# Patient Record
Sex: MALE | Race: WHITE | NOT HISPANIC OR LATINO | Employment: OTHER | URBAN - METROPOLITAN AREA
[De-identification: names, ages, dates, MRNs, and addresses within clinical notes are randomized per-mention and may not be internally consistent; named-entity substitution may affect disease eponyms.]

---

## 2017-03-27 ENCOUNTER — HOSPITAL ENCOUNTER (OUTPATIENT)
Dept: LAB | Facility: MEDICAL CENTER | Age: 72
End: 2017-03-27
Attending: INTERNAL MEDICINE
Payer: MEDICARE

## 2017-03-27 DIAGNOSIS — E03.4 HYPOTHYROIDISM DUE TO ACQUIRED ATROPHY OF THYROID: ICD-10-CM

## 2017-03-27 DIAGNOSIS — M54.40 CHRONIC BILATERAL LOW BACK PAIN WITH SCIATICA, SCIATICA LATERALITY UNSPECIFIED: ICD-10-CM

## 2017-03-27 DIAGNOSIS — E78.2 MIXED HYPERLIPIDEMIA: ICD-10-CM

## 2017-03-27 DIAGNOSIS — G89.29 CHRONIC BILATERAL LOW BACK PAIN WITH SCIATICA, SCIATICA LATERALITY UNSPECIFIED: ICD-10-CM

## 2017-03-27 LAB
ALBUMIN SERPL BCP-MCNC: 4 G/DL (ref 3.2–4.9)
ALBUMIN/GLOB SERPL: 1.5 G/DL
ALP SERPL-CCNC: 46 U/L (ref 30–99)
ALT SERPL-CCNC: 24 U/L (ref 2–50)
ANION GAP SERPL CALC-SCNC: 7 MMOL/L (ref 0–11.9)
APPEARANCE UR: CLEAR
AST SERPL-CCNC: 26 U/L (ref 12–45)
BASOPHILS # BLD AUTO: 0.1 K/UL (ref 0–0.12)
BASOPHILS NFR BLD AUTO: 1.6 % (ref 0–1.8)
BILIRUB SERPL-MCNC: 0.4 MG/DL (ref 0.1–1.5)
BILIRUB UR QL STRIP.AUTO: NEGATIVE
BUN SERPL-MCNC: 19 MG/DL (ref 8–22)
CALCIUM SERPL-MCNC: 9.2 MG/DL (ref 8.5–10.5)
CHLORIDE SERPL-SCNC: 107 MMOL/L (ref 96–112)
CHOLEST SERPL-MCNC: 130 MG/DL (ref 100–199)
CO2 SERPL-SCNC: 27 MMOL/L (ref 20–33)
COLOR UR AUTO: YELLOW
CREAT SERPL-MCNC: 1.14 MG/DL (ref 0.5–1.4)
CULTURE IF INDICATED INDCX: NO UA CULTURE
EOSINOPHIL # BLD: 0.36 K/UL (ref 0–0.51)
EOSINOPHIL NFR BLD AUTO: 5.7 % (ref 0–6.9)
ERYTHROCYTE [DISTWIDTH] IN BLOOD BY AUTOMATED COUNT: 44.9 FL (ref 35.9–50)
GLOBULIN SER CALC-MCNC: 2.7 G/DL (ref 1.9–3.5)
GLUCOSE SERPL-MCNC: 108 MG/DL (ref 65–99)
GLUCOSE UR STRIP.AUTO-MCNC: NEGATIVE MG/DL
HCT VFR BLD AUTO: 42.5 % (ref 42–52)
HDLC SERPL-MCNC: 41 MG/DL
HGB BLD-MCNC: 14.1 G/DL (ref 14–18)
IMM GRANULOCYTES # BLD AUTO: 0.02 K/UL (ref 0–0.11)
IMM GRANULOCYTES NFR BLD AUTO: 0.3 % (ref 0–0.9)
KETONES UR STRIP.AUTO-MCNC: NEGATIVE MG/DL
LDLC SERPL CALC-MCNC: 72 MG/DL
LEUKOCYTE ESTERASE UR QL STRIP.AUTO: NEGATIVE
LYMPHOCYTES # BLD: 1.89 K/UL (ref 1–4.8)
LYMPHOCYTES NFR BLD AUTO: 29.7 % (ref 22–41)
MCH RBC QN AUTO: 31.5 PG (ref 27–33)
MCHC RBC AUTO-ENTMCNC: 33.2 G/DL (ref 33.7–35.3)
MCV RBC AUTO: 95.1 FL (ref 81.4–97.8)
MICRO URNS: NORMAL
MONOCYTES # BLD: 0.69 K/UL (ref 0–0.85)
MONOCYTES NFR BLD AUTO: 10.8 % (ref 0–13.4)
NEUTROPHILS # BLD: 3.3 K/UL (ref 1.82–7.42)
NEUTROPHILS NFR BLD AUTO: 51.9 % (ref 44–72)
NITRITE UR QL STRIP.AUTO: NEGATIVE
NRBC # BLD AUTO: 0.02 K/UL
NRBC BLD-RTO: 0.3 /100 WBC
PH UR: 6 [PH]
PLATELET # BLD AUTO: 320 K/UL (ref 164–446)
PMV BLD AUTO: 9.7 FL (ref 9–12.9)
POTASSIUM SERPL-SCNC: 4 MMOL/L (ref 3.6–5.5)
PROT SERPL-MCNC: 6.7 G/DL (ref 6–8.2)
PROT UR QL STRIP: NEGATIVE MG/DL
RBC # BLD AUTO: 4.47 M/UL (ref 4.7–6.1)
RBC UR QL AUTO: NEGATIVE
SODIUM SERPL-SCNC: 141 MMOL/L (ref 135–145)
SP GR UR STRIP.AUTO: 1.02
TRIGL SERPL-MCNC: 85 MG/DL (ref 0–149)
TSH SERPL DL<=0.005 MIU/L-ACNC: 1.31 UIU/ML (ref 0.3–3.7)
WBC # BLD AUTO: 6.4 K/UL (ref 4.8–10.8)

## 2017-03-27 PROCEDURE — 80053 COMPREHEN METABOLIC PANEL: CPT

## 2017-03-27 PROCEDURE — 36415 COLL VENOUS BLD VENIPUNCTURE: CPT

## 2017-03-27 PROCEDURE — 85025 COMPLETE CBC W/AUTO DIFF WBC: CPT

## 2017-03-27 PROCEDURE — 84443 ASSAY THYROID STIM HORMONE: CPT

## 2017-03-27 PROCEDURE — 80061 LIPID PANEL: CPT

## 2017-03-27 PROCEDURE — 81003 URINALYSIS AUTO W/O SCOPE: CPT

## 2017-03-29 ENCOUNTER — APPOINTMENT (OUTPATIENT)
Dept: MEDICAL GROUP | Age: 72
End: 2017-03-29
Payer: MEDICARE

## 2017-03-29 ENCOUNTER — OFFICE VISIT (OUTPATIENT)
Dept: MEDICAL GROUP | Age: 72
End: 2017-03-29
Payer: MEDICARE

## 2017-03-29 VITALS
TEMPERATURE: 97.5 F | HEIGHT: 69 IN | SYSTOLIC BLOOD PRESSURE: 108 MMHG | WEIGHT: 167 LBS | DIASTOLIC BLOOD PRESSURE: 68 MMHG | BODY MASS INDEX: 24.73 KG/M2 | HEART RATE: 74 BPM | OXYGEN SATURATION: 96 %

## 2017-03-29 DIAGNOSIS — Z79.899 MEDICATION MANAGEMENT: ICD-10-CM

## 2017-03-29 DIAGNOSIS — D17.1 LIPOMA OF BACK: ICD-10-CM

## 2017-03-29 DIAGNOSIS — Z23 NEED FOR VACCINATION: ICD-10-CM

## 2017-03-29 DIAGNOSIS — R73.01 IFG (IMPAIRED FASTING GLUCOSE): ICD-10-CM

## 2017-03-29 DIAGNOSIS — R10.11 RUQ ABDOMINAL PAIN: ICD-10-CM

## 2017-03-29 PROCEDURE — 99214 OFFICE O/P EST MOD 30 MIN: CPT | Performed by: FAMILY MEDICINE

## 2017-03-29 RX ORDER — AMOXICILLIN 875 MG/1
875 TABLET, COATED ORAL 2 TIMES DAILY
Qty: 20 TAB | Refills: 0 | Status: SHIPPED | OUTPATIENT
Start: 2017-03-29 | End: 2017-04-13

## 2017-03-29 NOTE — ASSESSMENT & PLAN NOTE
Patient states that he travels all over the [place] and he likes amoxicillin with him at all times.

## 2017-03-29 NOTE — ASSESSMENT & PLAN NOTE
The patient denies  any polydipsia, polyuria, polyphagia, weight loss, no numbness or tingling anywhere, no changes in vision.    Results for ERLIN SALDAÑA (MRN [id number]) as of 3/29/2017 15:09   Ref. Range 3/27/2017 06:52   Glucose Latest Ref Range: 65-99 mg/dL 108 (H)

## 2017-03-29 NOTE — PROGRESS NOTES
This medical record contains text that has been entered with the assistance of computer voice recognition and dictation software.  Therefore, it may contain unintended errors in text, spelling, punctuation, or grammar    No chief complaint on file.      Dusty Saldaña is a 71 y.o. male here evaluation and management of: Lump on back, right upper quadrant pain, elevated fasting glucose      HPI:     RUQ abdominal pain  Patient states that he's been having right upper quadrant pain for the past 3 months. The pain is sharp in nature, episodic, 4-10 does not radiate anywhere, it is not associated with any type of food. Denies any discoloration of the skin, no blood in stool, no dark tarry stool, no vomiting.    Medication management  Patient states that he travels all over the [place] and he likes amoxicillin with him at all times.     Lipoma of back  Patient states that his physical therapist was doing therapy with him and noticed that he had a lump on his left upper back. It's been there for at least a year is causing pain and he would like to have it removed. Denies any loss of bladder or bowel function and numbness returning anywhere.    IFG (impaired fasting glucose)  The patient denies  any polydipsia, polyuria, polyphagia, weight loss, no numbness or tingling anywhere, no changes in vision.    Results for DUSTY SALDAÑA (MRN [id number]) as of 3/29/2017 15:09   Ref. Range 3/27/2017 06:52   Glucose Latest Ref Range: 65-99 mg/dL 108 (H)         Current medicines (including changes today)  Current Outpatient Prescriptions   Medication Sig Dispense Refill   • amoxicillin (AMOXIL) 875 MG tablet Take 1 Tab by mouth 2 times a day. 20 Tab 0   • meloxicam (MOBIC) 15 MG tablet Take 15 mg by mouth every day.     • levothyroxine (SYNTHROID) 125 MCG Tab TAKE 1 TABLET BY MOUTH EVERY DAY **ALLOW 24- 48HRS FOR REFILLS** 90 Tab 4   • atorvastatin (LIPITOR) 10 MG Tab Take 1 Tab by mouth every day. 90 Tab 4   • NON SPECIFIED  "Shingles vaccine 1 Each 0   • omega-3 acid ethyl esters (LOVAZA) 1 GM capsule Take 1 Cap by mouth every day. 90 Cap 3     No current facility-administered medications for this visit.     He  has a past medical history of Hyperlipidemia; Thyroid disease; and Erectile dysfunction.  He  has past surgical history that includes hernia repair and eye surgery.  Social History   Substance Use Topics   • Smoking status: Never Smoker    • Smokeless tobacco: Never Used   • Alcohol Use: 0.0 oz/week     0 Standard drinks or equivalent per week      Comment: Occasional wine      Social History     Social History Narrative     Family History   Problem Relation Age of Onset   • Cancer Father      Family Status   Relation Status Death Age   • Mother Alive    • Father           ROS  Please see history of present illness    All other systems reviewed and are negative     Objective:     Blood pressure 108/68, pulse 74, temperature 36.4 °C (97.5 °F), height 1.753 m (5' 9.02\"), weight 75.751 kg (167 lb), SpO2 96 %. Body mass index is 24.65 kg/(m^2).  Physical Exam:    Constitutional: Alert, no distress.  Skin: Warm, dry, good turgor, no rashes in visible areas.  Eye: Equal, round and reactive, conjunctiva clear, lids normal.  ENMT: Lips without lesions, good dentition, oropharynx clear.  Neck: Trachea midline, no masses, no thyromegaly. No cervical or supraclavicular lymphadenopathy.  Respiratory: Unlabored respiratory effort, lungs clear to auscultation, no wheezes, no ronchi.  Cardiovascular: Normal S1, S2, no murmur, no edema.  Abdomen: Soft, non-tender, no masses, no hepatosplenomegaly.  Psych: Alert and oriented x3, normal affect and mood.  Back--- left upper trapezius-- 2 cm round symmetrical, non-motile, nonfluctuant lipomatous mass, not warm, not red        Assessment and Plan:   The following treatment plan was discussed, again this medical record contains text that has been entered with the assistance of computer voice " recognition and dictation software.  Therefore, it may contain unintended errors in text, spelling, punctuation, or grammar      1. Lipoma of back  Explained to patient that this is deeper than it looks  He would need to have this removed and deshelled in surgery    - REFERRAL TO GENERAL SURGERY    3. RUQ abdominal pain    - US-GALLBLADDER; Future    4. Medication management    - amoxicillin (AMOXIL) 875 MG tablet; Take 1 Tab by mouth 2 times a day.  Dispense: 20 Tab; Refill: 0    5. IFG (impaired fasting glucose)    Discussed the following with patient  IFG= A1C of 5.7-6.4   Impaired glucose tolerance (IGT) - OGTT (75 g oral glucose load) results in a two-hour plasma glucose of 140 to 199 mg/dL (7.8 to 11.0 mmol/L).  Impaired fasting glucose (IFG) -  to 125 mg/dL (5.6 to 6.9 mmol/L).    I recommend  aggressive lifestyle modification (weight loss, and increase physical activity)    There is a  risk of developing Diabetes mellitus--and the further increased risk in patients who are obese and have a positive family history    The goals of intervention in individuals  at risk for developing DM include the prevention or delay of DM and the associated increased risk of Microvascular complications and cardiovascular disease (CVD)      If lifestyle interventions fail, then we shall begin Metformin for Diabetes prevention, this is based on the Diabetes prevention  program (DPP)          Followup: Return in about 3 months (around 6/29/2017) for Reevaluation.

## 2017-03-29 NOTE — MR AVS SNAPSHOT
"        Dusty Eid   3/29/2017 3:20 PM   Office Visit   MRN: [id number]    Department:  [address] [place] [address]   Dept Phone:  [phone]    Description:  Male : 1945   Provider:  Larissa Ventura M.D.           Allergies as of 3/29/2017     Allergen Noted Reactions    Codeine 2010         You were diagnosed with     Need for vaccination   [550105]       Lipoma of back   [223501]       RUQ abdominal pain   [348764]       Medication management   [642168]       IFG (impaired fasting glucose)   [099692]         Vital Signs     Blood Pressure Pulse Temperature Height Weight Body Mass Index    108/68 mmHg 74 36.4 °C (97.5 °F) 1.753 m (5' 9.02\") 75.751 kg (167 lb) 24.65 kg/m2    Oxygen Saturation Smoking Status                96% Never Smoker           Basic Information     Date Of Birth Sex Race Ethnicity Preferred Language    1945 Male White Non- English      Your appointments     2017  8:00 AM   US ABDOMEN FASTING (30 MINUTES) with [facility] US [facility]   [facility]    [facility]  [address]  [address]  [place] [address] [phone]           NPO 8 hours.  For  Abdomen, NPO 2 hours, schedule Abdomen Complete and include \" Abdomen\" in Appt Notes.            May 12, 2017  8:20 AM   Established Patient with Marcial Mcfarlane M.D.   [address] [place] [address] [phone]           You will be receiving a confirmation call a few days before your appointment from our automated call confirmation system.              Problem List              ICD-10-CM Priority Class Noted - Resolved    Hypothyroidism due to acquired atrophy of thyroid E03.4   2010 - Present    Chronic left hip pain- mild djd on xray M25.552, G89.29   2015 - Present    Mixed hyperlipidemia E78.2   2015 - Present    Chronic right shoulder pain- r/o RCT-ref to ortho " M25.511, G89.29   3/30/2016 - Present    DJD (degenerative joint disease), cervical M50.30   11/11/2016 - Present    Lipoma of back D17.1   3/29/2017 - Present    RUQ abdominal pain R10.11   3/29/2017 - Present    Medication management Z79.899   3/29/2017 - Present    IFG (impaired fasting glucose) R73.01   3/29/2017 - Present      Health Maintenance        Date Due Completion Dates    IMM DTaP/Tdap/Td Vaccine (1 - Tdap) 11/14/1964 ---    IMM ZOSTER VACCINE 11/14/2005 ---    IMM PNEUMOCOCCAL 65+ (ADULT) LOW/MEDIUM RISK SERIES (2 of 2 - PPSV23) 2/24/2017 2/24/2016    COLONOSCOPY 2/12/2019 2/12/2014            Current Immunizations     13-VALENT PCV PREVNAR 2/24/2016 11:01 AM    Influenza Vaccine Adult HD 11/11/2016      Below and/or attached are the medications your provider expects you to take. Review all of your home medications and newly ordered medications with your provider and/or pharmacist. Follow medication instructions as directed by your provider and/or pharmacist. Please keep your medication list with you and share with your provider. Update the information when medications are discontinued, doses are changed, or new medications (including over-the-counter products) are added; and carry medication information at all times in the event of emergency situations     Allergies:  CODEINE - (reactions not documented)               Medications  Valid as of: March 29, 2017 -  3:45 PM    Generic Name Brand Name Tablet Size Instructions for use    Amoxicillin (Tab) AMOXIL 875 MG Take 1 Tab by mouth 2 times a day.        Atorvastatin Calcium (Tab) LIPITOR 10 MG Take 1 Tab by mouth every day.        Levothyroxine Sodium (Tab) SYNTHROID 125 MCG TAKE 1 TABLET BY MOUTH EVERY DAY **ALLOW 24- 48HRS FOR REFILLS**        Meloxicam (Tab) MOBIC 15 MG Take 15 mg by mouth every day.        NON SPECIFIED   Shingles vaccine        Omega-3-acid Ethyl Esters (Cap) LOVAZA 1 GM Take 1 Cap by mouth every day.        .                    Medicines prescribed today were sent to:     [id number] - [place], [place] - [address]    [address] [facility] [place] [address]    Phone: [phone] Fax: [phone]    Open 24 Hours?: No      Medication refill instructions:       If your prescription bottle indicates you have medication refills left, it is not necessary to call your provider’s office. Please contact your pharmacy and they will refill your medication.    If your prescription bottle indicates you do not have any refills left, you may request refills at any time through one of the following ways: The online [employer] system (except Urgent Care), by calling your provider’s office, or by asking your pharmacy to contact your provider’s office with a refill request. Medication refills are processed only during regular business hours and may not be available until the next business day. Your provider may request additional information or to have a follow-up visit with you prior to refilling your medication.   *Please Note: Medication refills are assigned a new Rx number when refilled electronically. Your pharmacy may indicate that no refills were authorized even though a new prescription for the same medication is available at the pharmacy. Please request the medicine by name with the pharmacy before contacting your provider for a refill.        Your To Do List     Future Labs/Procedures Complete By [facility]GALLBL[facility]DER  As directed 3/29/2018      Referral     A referral request has been sent to our patient care coordination department. Please allow 3-5 business days for us to process this request and contact you either by phone or mail. If you do not hear from us by the 5th business day, please call us at [phone].           [employer] Access Code: Activation code not generated  Current [employer] Status: Active

## 2017-03-29 NOTE — ASSESSMENT & PLAN NOTE
Patient states that he's been having right upper quadrant pain for the past 3 months. The pain is sharp in nature, episodic, 4-10 does not radiate anywhere, it is not associated with any type of food. Denies any discoloration of the skin, no blood in stool, no dark tarry stool, no vomiting.

## 2017-03-29 NOTE — ASSESSMENT & PLAN NOTE
Patient states that his physical therapist was doing therapy with him and noticed that he had a lump on his left upper back. It's been there for at least a year is causing pain and he would like to have it removed. Denies any loss of bladder or bowel function and numbness returning anywhere.

## 2017-04-04 ENCOUNTER — HOSPITAL ENCOUNTER (OUTPATIENT)
Dept: RADIOLOGY | Facility: MEDICAL CENTER | Age: 72
End: 2017-04-04
Attending: FAMILY MEDICINE
Payer: MEDICARE

## 2017-04-04 DIAGNOSIS — R10.11 RUQ ABDOMINAL PAIN: ICD-10-CM

## 2017-04-04 PROCEDURE — 76705 ECHO EXAM OF ABDOMEN: CPT

## 2017-04-13 ENCOUNTER — HOSPITAL ENCOUNTER (OUTPATIENT)
Dept: RADIOLOGY | Facility: MEDICAL CENTER | Age: 72
End: 2017-04-13
Attending: SURGERY | Admitting: SURGERY
Payer: MEDICARE

## 2017-04-13 DIAGNOSIS — Z01.810 PRE-OPERATIVE CARDIOVASCULAR EXAMINATION: ICD-10-CM

## 2017-04-13 DIAGNOSIS — Z01.811 PRE-OPERATIVE RESPIRATORY EXAMINATION: ICD-10-CM

## 2017-04-13 DIAGNOSIS — Z01.812 PRE-OPERATIVE LABORATORY EXAMINATION: ICD-10-CM

## 2017-04-13 LAB
ANION GAP SERPL CALC-SCNC: 7 MMOL/L (ref 0–11.9)
BASOPHILS # BLD AUTO: 1 % (ref 0–1.8)
BASOPHILS # BLD: 0.07 K/UL (ref 0–0.12)
BUN SERPL-MCNC: 15 MG/DL (ref 8–22)
CALCIUM SERPL-MCNC: 9.6 MG/DL (ref 8.5–10.5)
CHLORIDE SERPL-SCNC: 103 MMOL/L (ref 96–112)
CO2 SERPL-SCNC: 26 MMOL/L (ref 20–33)
CREAT SERPL-MCNC: 1.11 MG/DL (ref 0.5–1.4)
EOSINOPHIL # BLD AUTO: 0.41 K/UL (ref 0–0.51)
EOSINOPHIL NFR BLD: 5.8 % (ref 0–6.9)
ERYTHROCYTE [DISTWIDTH] IN BLOOD BY AUTOMATED COUNT: 44.9 FL (ref 35.9–50)
GFR SERPL CREATININE-BSD FRML MDRD: >60 ML/MIN/1.73 M 2
GLUCOSE SERPL-MCNC: 93 MG/DL (ref 65–99)
HCT VFR BLD AUTO: 44.9 % (ref 42–52)
HGB BLD-MCNC: 15 G/DL (ref 14–18)
IMM GRANULOCYTES # BLD AUTO: 0.02 K/UL (ref 0–0.11)
IMM GRANULOCYTES NFR BLD AUTO: 0.3 % (ref 0–0.9)
LYMPHOCYTES # BLD AUTO: 1.7 K/UL (ref 1–4.8)
LYMPHOCYTES NFR BLD: 24 % (ref 22–41)
MCH RBC QN AUTO: 31.4 PG (ref 27–33)
MCHC RBC AUTO-ENTMCNC: 33.4 G/DL (ref 33.7–35.3)
MCV RBC AUTO: 94.1 FL (ref 81.4–97.8)
MONOCYTES # BLD AUTO: 0.79 K/UL (ref 0–0.85)
MONOCYTES NFR BLD AUTO: 11.1 % (ref 0–13.4)
NEUTROPHILS # BLD AUTO: 4.1 K/UL (ref 1.82–7.42)
NEUTROPHILS NFR BLD: 57.8 % (ref 44–72)
NRBC # BLD AUTO: 0 K/UL
NRBC BLD AUTO-RTO: 0 /100 WBC
PLATELET # BLD AUTO: 288 K/UL (ref 164–446)
PMV BLD AUTO: 9.4 FL (ref 9–12.9)
POTASSIUM SERPL-SCNC: 3.9 MMOL/L (ref 3.6–5.5)
RBC # BLD AUTO: 4.77 M/UL (ref 4.7–6.1)
SODIUM SERPL-SCNC: 136 MMOL/L (ref 135–145)
WBC # BLD AUTO: 7.1 K/UL (ref 4.8–10.8)

## 2017-04-13 PROCEDURE — 71020 DX-CHEST-2 VIEWS: CPT

## 2017-04-13 PROCEDURE — 85025 COMPLETE CBC W/AUTO DIFF WBC: CPT

## 2017-04-13 PROCEDURE — 80048 BASIC METABOLIC PNL TOTAL CA: CPT

## 2017-04-13 PROCEDURE — 36415 COLL VENOUS BLD VENIPUNCTURE: CPT

## 2017-04-14 LAB — EKG IMPRESSION: NORMAL

## 2017-04-19 ENCOUNTER — HOSPITAL ENCOUNTER (OUTPATIENT)
Facility: MEDICAL CENTER | Age: 72
End: 2017-04-19
Attending: SURGERY | Admitting: SURGERY
Payer: MEDICARE

## 2017-04-19 VITALS
SYSTOLIC BLOOD PRESSURE: 126 MMHG | HEIGHT: 68 IN | OXYGEN SATURATION: 96 % | TEMPERATURE: 98 F | DIASTOLIC BLOOD PRESSURE: 68 MMHG | HEART RATE: 44 BPM | BODY MASS INDEX: 24.76 KG/M2 | WEIGHT: 163.36 LBS | RESPIRATION RATE: 14 BRPM

## 2017-04-19 PROBLEM — D17.1 LIPOMA OF BACK: Status: RESOLVED | Noted: 2017-03-29 | Resolved: 2017-04-19

## 2017-04-19 PROCEDURE — A4606 OXYGEN PROBE USED W OXIMETER: HCPCS | Performed by: SURGERY

## 2017-04-19 PROCEDURE — 700102 HCHG RX REV CODE 250 W/ 637 OVERRIDE(OP)

## 2017-04-19 PROCEDURE — 160036 HCHG PACU - EA ADDL 30 MINS PHASE I: Performed by: SURGERY

## 2017-04-19 PROCEDURE — 160048 HCHG OR STATISTICAL LEVEL 1-5: Performed by: SURGERY

## 2017-04-19 PROCEDURE — 501838 HCHG SUTURE GENERAL: Performed by: SURGERY

## 2017-04-19 PROCEDURE — 160009 HCHG ANES TIME/MIN: Performed by: SURGERY

## 2017-04-19 PROCEDURE — 110382 HCHG SHELL REV 271: Performed by: SURGERY

## 2017-04-19 PROCEDURE — A6402 STERILE GAUZE <= 16 SQ IN: HCPCS | Performed by: SURGERY

## 2017-04-19 PROCEDURE — 110371 HCHG SHELL REV 272: Performed by: SURGERY

## 2017-04-19 PROCEDURE — 160002 HCHG RECOVERY MINUTES (STAT): Performed by: SURGERY

## 2017-04-19 PROCEDURE — 500888 HCHG PACK, MINOR BASIN: Performed by: SURGERY

## 2017-04-19 PROCEDURE — 160035 HCHG PACU - 1ST 60 MINS PHASE I: Performed by: SURGERY

## 2017-04-19 PROCEDURE — A9270 NON-COVERED ITEM OR SERVICE: HCPCS

## 2017-04-19 PROCEDURE — 160028 HCHG SURGERY MINUTES - 1ST 30 MINS LEVEL 3: Performed by: SURGERY

## 2017-04-19 PROCEDURE — 160039 HCHG SURGERY MINUTES - EA ADDL 1 MIN LEVEL 3: Performed by: SURGERY

## 2017-04-19 PROCEDURE — 700101 HCHG RX REV CODE 250

## 2017-04-19 PROCEDURE — 88304 TISSUE EXAM BY PATHOLOGIST: CPT

## 2017-04-19 PROCEDURE — 700111 HCHG RX REV CODE 636 W/ 250 OVERRIDE (IP)

## 2017-04-19 RX ORDER — BUPIVACAINE HYDROCHLORIDE AND EPINEPHRINE 5; 5 MG/ML; UG/ML
INJECTION, SOLUTION EPIDURAL; INTRACAUDAL; PERINEURAL
Status: DISCONTINUED | OUTPATIENT
Start: 2017-04-19 | End: 2017-04-19 | Stop reason: HOSPADM

## 2017-04-19 RX ORDER — ONDANSETRON 2 MG/ML
4 INJECTION INTRAMUSCULAR; INTRAVENOUS EVERY 4 HOURS PRN
Status: DISCONTINUED | OUTPATIENT
Start: 2017-04-19 | End: 2017-04-19 | Stop reason: HOSPADM

## 2017-04-19 RX ORDER — SODIUM CHLORIDE, SODIUM LACTATE, POTASSIUM CHLORIDE, CALCIUM CHLORIDE 600; 310; 30; 20 MG/100ML; MG/100ML; MG/100ML; MG/100ML
1000 INJECTION, SOLUTION INTRAVENOUS
Status: COMPLETED | OUTPATIENT
Start: 2017-04-19 | End: 2017-04-19

## 2017-04-19 RX ADMIN — SODIUM CHLORIDE, SODIUM LACTATE, POTASSIUM CHLORIDE, CALCIUM CHLORIDE 1000 ML: 600; 310; 30; 20 INJECTION, SOLUTION INTRAVENOUS at 10:50

## 2017-04-19 ASSESSMENT — PAIN SCALES - GENERAL
PAINLEVEL_OUTOF10: 1
PAINLEVEL_OUTOF10: 0

## 2017-04-19 NOTE — OR NURSING
1207: report received from Dr. Arvizu. To PACU from OR via gurney, sleeping, respirations spontaneous and non-labored via OPA.   1222: sleeping. OPA in place.   1230: OPA removed without incident. o2 to 2L. Tagaderm to upper mid back with small amount blood under dressing. Patient denies pain or nausea.  1245: sleeping.  1300: patients spouse to RR. Tolerating po water. Denies pain or nausea. No change in surgical site assessment.  1310: spouse assisted patient with dressing.  1322: discharge instructions reviewed with patient and spouse and patient ambulated with steady gait to exit with all belongings.

## 2017-04-19 NOTE — DISCHARGE INSTRUCTIONS
ACTIVITY: Rest and take it easy for the first 24 hours.  A responsible adult is recommended to remain with you during that time.  It is normal to feel sleepy.  We encourage you to not do anything that requires balance, judgment or coordination.    MILD FLU-LIKE SYMPTOMS ARE NORMAL. YOU MAY EXPERIENCE GENERALIZED MUSCLE ACHES, THROAT IRRITATION, HEADACHE AND/OR SOME NAUSEA.    FOR 24 HOURS DO NOT:  Drive, operate machinery or run household appliances.  Drink beer or alcoholic beverages.   Make important decisions or sign legal documents.    SPECIAL INSTRUCTIONS: *Follow up: 7-10 days  Activity: no lifting weight greater than 20lbs x2  weeks  Diet: clear liquids tonight then advance as tolerated  Showers only x 2 weeks  No driving for one week or while on pain medications  Wound Care: remove tegaderm in 4 days*      DIET: To avoid nausea, slowly advance diet as tolerated, avoiding spicy or greasy foods for the first day.  Add more substantial food to your diet according to your physician's instructions.  Babies can be fed formula or breast milk as soon as they are hungry.  INCREASE FLUIDS AND FIBER TO AVOID CONSTIPATION.    SURGICAL DRESSING/BATHING: see above    FOLLOW-UP APPOINTMENT:  A follow-up appointment should be arranged with your doctor in 7-10 days; call to schedule.    You should CALL YOUR PHYSICIAN if you develop:  Fever greater than 101 degrees F.  Pain not relieved by medication, or persistent nausea or vomiting.  Excessive bleeding (blood soaking through dressing) or unexpected drainage from the wound.  Extreme redness or swelling around the incision site, drainage of pus or foul smelling drainage.  Inability to urinate or empty your bladder within 8 hours.  Problems with breathing or chest pain.    You should call 911 if you develop problems with breathing or chest pain.  If you are unable to contact your doctor or surgical center, you should go to the nearest emergency room or urgent care center.   Physician's telephone #: [phone]    If any questions arise, call your doctor.  If your doctor is not available, please feel free to call the Surgical Center at [phone].  The Center is open Monday through Friday from 7AM to 7PM.  You can also call the HEALTH HOTLINE open 24 hours/day, 7 days/week and speak to a nurse at [phone], or toll free at [phone].    A registered nurse may call you a few days after your surgery to see how you are doing after your procedure.    MEDICATIONS: Resume taking daily medication.  Take prescribed pain medication with food.  If no medication is prescribed, you may take non-aspirin pain medication if needed.  PAIN MEDICATION CAN BE VERY CONSTIPATING.  Take a stool softener or laxative such as senokot, pericolace, or milk of magnesia if needed.    Prescription given for to family.  Last pain medication given at n/a. Take your first dose pain medication when needed today.    If your physician has prescribed pain medication that includes Acetaminophen (Tylenol), do not take additional Acetaminophen (Tylenol) while taking the prescribed medication.    Depression / Suicide Risk    As you are discharged from this [facility] facility, it is important to learn how to keep safe from harming yourself.    Recognize the warning signs:  · Abrupt changes in personality, positive or negative- including increase in energy   · Giving away possessions  · Change in eating patterns- significant weight changes-  positive or negative  · Change in sleeping patterns- unable to sleep or sleeping all the time   · Unwillingness or inability to communicate  · Depression  · Unusual sadness, discouragement and loneliness  · Talk of wanting to die  · Neglect of personal appearance   · Rebelliousness- reckless behavior  · Withdrawal from people/activities they love  · Confusion- inability to concentrate     If you or a loved one observes any of these behaviors or has concerns about self-harm,  here's what you can do:  · Talk about it- your feelings and reasons for harming yourself  · Remove any means that you might use to hurt yourself (examples: pills, rope, extension cords, firearm)  · Get professional help from the community (Mental Health, Substance Abuse, psychological counseling)  · Do not be alone:Call your Safe Contact- someone whom you trust who will be there for you.  · Call your local CRISIS HOTLINE 540-0923 or 920-141-3968  · Call your local Children's Mobile Crisis Response Team [place] (889) 519-0923 or www.[employer] s.r.l  · Call the toll free National Suicide Prevention Hotlines   · National Suicide Prevention Lifeline 943-716-BUJZ (3799)  · National Hope Line Network 800-SUICIDE (064-1318)

## 2017-04-19 NOTE — IP AVS SNAPSHOT
" Home Care Instructions                                                                                                                Name:Dusty Eid  Medical Record Number:[id number]  CSN: [id number]    YOB: 1945   Age: 71 y.o.  Sex: male  HT:1.727 m (5' 8\") WT: 74.1 kg (163 lb 5.8 oz)          Admit Date: 4/19/2017     Discharge Date:   Today's Date: 4/19/2017  Attending Doctor:  David Rivas M.D.                  Allergies:  Codeine                Discharge Instructions         ACTIVITY: Rest and take it easy for the first 24 hours.  A responsible adult is recommended to remain with you during that time.  It is normal to feel sleepy.  We encourage you to not do anything that requires balance, judgment or coordination.    MILD FLU-LIKE SYMPTOMS ARE NORMAL. YOU MAY EXPERIENCE GENERALIZED MUSCLE ACHES, THROAT IRRITATION, HEADACHE AND/OR SOME NAUSEA.    FOR 24 HOURS DO NOT:  Drive, operate machinery or run household appliances.  Drink beer or alcoholic beverages.   Make important decisions or sign legal documents.    SPECIAL INSTRUCTIONS: *Follow up: 7-10 days  Activity: no lifting weight greater than 20lbs x2  weeks  Diet: clear liquids tonight then advance as tolerated  Showers only x 2 weeks  No driving for one week or while on pain medications  Wound Care: remove tegaderm in 4 days*      DIET: To avoid nausea, slowly advance diet as tolerated, avoiding spicy or greasy foods for the first day.  Add more substantial food to your diet according to your physician's instructions.  Babies can be fed formula or breast milk as soon as they are hungry.  INCREASE FLUIDS AND FIBER TO AVOID CONSTIPATION.    SURGICAL DRESSING/BATHING: see above    FOLLOW-UP APPOINTMENT:  A follow-up appointment should be arranged with your doctor in 7-10 days; call to schedule.    You should CALL YOUR PHYSICIAN if you develop:  Fever greater than 101 degrees F.  Pain not relieved by medication, or persistent nausea or " vomiting.  Excessive bleeding (blood soaking through dressing) or unexpected drainage from the wound.  Extreme redness or swelling around the incision site, drainage of pus or foul smelling drainage.  Inability to urinate or empty your bladder within 8 hours.  Problems with breathing or chest pain.    You should call 911 if you develop problems with breathing or chest pain.  If you are unable to contact your doctor or surgical center, you should go to the nearest emergency room or urgent care center.  Physician's telephone #: [phone]    If any questions arise, call your doctor.  If your doctor is not available, please feel free to call the Surgical Center at [phone].  The Center is open Monday through Friday from 7AM to 7PM.  You can also call the [facility] HOTLINE open 24 hours/day, 7 days/week and speak to a nurse at [phone], or toll free at [phone].    A registered nurse may call you a few days after your surgery to see how you are doing after your procedure.    MEDICATIONS: Resume taking daily medication.  Take prescribed pain medication with food.  If no medication is prescribed, you may take non-aspirin pain medication if needed.  PAIN MEDICATION CAN BE VERY CONSTIPATING.  Take a stool softener or laxative such as senokot, pericolace, or milk of magnesia if needed.    Prescription given for to family.  Last pain medication given at n/a. Take your first dose pain medication when needed today.    If your physician has prescribed pain medication that includes Acetaminophen (Tylenol), do not take additional Acetaminophen (Tylenol) while taking the prescribed medication.    Depression / Suicide Risk    As you are discharged from this Critical access hospital facility, it is important to learn how to keep safe from harming yourself.    Recognize the warning signs:  · Abrupt changes in personality, positive or negative- including increase in energy   · Giving away possessions  · Change in eating patterns-  significant weight changes-  positive or negative  · Change in sleeping patterns- unable to sleep or sleeping all the time   · Unwillingness or inability to communicate  · Depression  · Unusual sadness, discouragement and loneliness  · Talk of wanting to die  · Neglect of personal appearance   · Rebelliousness- reckless behavior  · Withdrawal from people/activities they love  · Confusion- inability to concentrate     If you or a loved one observes any of these behaviors or has concerns about self-harm, here's what you can do:  · Talk about it- your feelings and reasons for harming yourself  · Remove any means that you might use to hurt yourself (examples: pills, rope, extension cords, firearm)  · Get professional help from the community (Mental Health, Substance Abuse, psychological counseling)  · Do not be alone:Call your Safe Contact- someone whom you trust who will be there for you.  · Call your local CRISIS HOTLINE 734-7543 or 680-554-3708  · Call your local Children's Mobile Crisis Response Team [place] (640) 545-7718 or www.[employer]  · Call the toll free National Suicide Prevention Hotlines   · National Suicide Prevention Lifeline 685-476-ECZF (9916)  · National Hope Line Network 800-SUICIDE (779-2683)       Medication List      CONTINUE taking these medications        Instructions    Morning Afternoon Evening Bedtime    aspirin EC 81 MG Tbec   Commonly known as:  ECOTRIN        Take 81 mg by mouth every day.   Dose:  81 mg                        atorvastatin 10 MG Tabs   Commonly known as:  LIPITOR        Take 1 Tab by mouth every day.   Dose:  10 mg                        levothyroxine 125 MCG Tabs   Commonly known as:  SYNTHROID        TAKE 1 TABLET BY MOUTH EVERY DAY **ALLOW 24- 48HRS FOR REFILLS**                        meloxicam 15 MG tablet   Commonly known as:  MOBIC        Take 15 mg by mouth every day.   Dose:  15 mg                        ZANTAC PO        Take 1 Tab by mouth as needed.    Dose:  1 Tab                                Medication Information     Above and/or attached are the medications your physician expects you to take upon discharge. Review all of your home medications and newly ordered medications with your doctor and/or pharmacist. Follow medication instructions as directed by your doctor and/or pharmacist. Please keep your medication list with you and share with your physician. Update the information when medications are discontinued, doses are changed, or new medications (including over-the-counter products) are added; and carry medication information at all times in the event of emergency situations.        Resources     Quit Smoking / Tobacco Use:    I understand the use of any tobacco products increases my chance of suffering from future heart disease or stroke and could cause other illnesses which may shorten my life. Quitting the use of tobacco products is the single most important thing I can do to improve my health. For further information on smoking / tobacco cessation call a Toll Free Quit Line at 1-966.711.1780 (*National Cancer [place]) or 1-329.102.9819 (American Lung Association) or you can access the web based program at www.lungConstruct.org.    [place] Tobacco Users Help Line:  (327) 587-8545       Toll Free: 1-959.894.4956  Quit Tobacco Program [facility] Management Services (157)462-1801    Crisis Hotline:    [place] Crisis Hotline:  8-637-BJACVJB or 1-445.797.8034    [place] Crisis Hotline:    1-447.638.4031 or 875-929-4442    Discharge Survey:   Thank you for choosing [facility]. We hope we did everything we could to make your hospital stay a pleasant one. You may be receiving a survey and we would appreciate your time and participation in answering the questions. Your input is very valuable to us in our efforts to improve our service to our patients and their families.            Signatures     My signature on this form indicates that:    1. I acknowledge  receipt and understanding of these Home Care Instruction.  2. My questions regarding this information have been answered to my satisfaction.  3. I have formulated a plan with my discharge nurse to obtain my prescribed medications for home.    __________________________________      __________________________________                   Patient Signature                                 Guardian/Responsible Adult Signature      __________________________________                 __________       ________                       Nurse Signature                                               Date                 Time

## 2017-04-19 NOTE — IP AVS SNAPSHOT
4/19/2017    Dusty Eid  [address] [address]  [place] [address]    Dear Dusty:    [facility] wants to ensure your discharge home is safe and you or your loved ones have had all of your questions answered regarding your care after you leave the hospital.    Below is a list of resources and contact information should you have any questions regarding your hospital stay, follow-up instructions, or active medical symptoms.    Questions or Concerns Regarding… Contact   Medical Questions Related to Your Discharge  (7 days a week, 8am-5pm) Contact a Nurse Care Coordinator   [phone]   Medical Questions Not Related to Your Discharge  (24 hours a day / 7 days a week)  Contact the Nurse Health Line   [phone]    Medications or Discharge Instructions Refer to your discharge packet   or contact your [facility] Primary Care Provider   [phone]   Follow-up Appointment(s) Schedule your appointment via [employer]   or contact Scheduling [phone]   Billing Review your statement via [employer]  or contact Billing [phone]   Medical Records Review your records via [employer]   or contact Medical Records [phone]     You may receive a telephone call within two days of discharge. This call is to make certain you understand your discharge instructions and have the opportunity to have any questions answered. You can also easily access your medical information, test results and upcoming appointments via the [employer] free online health management tool. You can learn more and sign up at [employer]. For assistance setting up your [employer] account, please call [phone].    Once again, we want to ensure your discharge home is safe and that you have a clear understanding of any next steps in your care. If you have any questions or concerns, please do not hesitate to contact us, we are here for you. Thank you for choosing [facility] for your healthcare needs.    Sincerely,    Your [facility] Healthcare Team

## 2017-04-19 NOTE — OP REPORT
DATE OF SERVICE:  04/19/2017    PREOPERATIVE DIAGNOSIS:  A 5 cm mid back/mid neck lipoma.    POSTOPERATIVE DIAGNOSIS:  Mid back/mid neck lipoma 6x4 cm.    PROCEDURE PERFORMED:  Excision of lipoma from mid back and shoulder.    ANESTHESIA:  General endotracheal by Dr. Arvizu.    SURGEON:  David Rivas MD.    INDICATIONS:  Patient with a symptomatic enlarging lipoma at the base of his   neck and shoulders that is causing pain.    OPERATIVE FINDINGS:  A 6x4 cm lipoma adherent to the fascia of the underlying   muscle.    OPERATIVE NOTE:  The patient was taken to the operating room, placed in supine   position, given general endotracheal anesthesia by Dr. Arvizu.  Once   properly anesthetized, was flipped over into a prone position with care to   make sure all pressure points were padded.  He was then prepped and draped in   usual sterile fashion.  A transverse incision was made over the palpable mass   after the skin had been anesthetized with 0.5% Marcaine with epinephrine.    Incision length was 6 cm.  Incision was carried down through the skin and   subcutaneous tissue.  The subcutaneous fascia was excised and going through   that, a large lipoma was then encountered in the subcutaneous tissue overlying   the fascia of the muscle.  This was taken off the fascia with electrocautery   and bluntly dissected out to delineate the edges.  Eventually, I was able to   remove the entire lipoma en bloc and it measured 6x4 cm and it was marked for   the right lateral and superior margins and sent to pathology.  The wound was   then closed with a deep and superficial subcutaneous tissue layers with 3-0   Vicryls and the skin was closed with a 4-0 Vicryl subcuticular closure.    Patient tolerated the procedure well.  There were no apparent complications.    Lap, sponge and instrument counts were correct.       ____________________________________     David Rivas MD    PS / NTS    DD:  04/19/2017 12:21:27  DT:  04/19/2017  14:54:52    D#:  [id number]  Job#:  [id number]

## 2017-04-19 NOTE — OR SURGEON
Immediate Post-Operative Note      PreOp Diagnosis: Lipoma of midback/shoulders    PostOp Diagnosis: Same    Procedure(s):  LIPOMA EXCISION - 5CM  FROM MIDBACK/NECK - Wound Class: Clean     Surgeon(s):  David Rivas M.D.    Anesthesiologist/Type of Anesthesia:GET  Anesthesiologist: Oscar Arvizu M.D./General    Surgical Staff:  Circulator: Usha Cueva R.N.  Relief Circulator: Eula Nash R.N.  Scrub Person: Jose Mcnamara R.N.    Specimen: lipoma     Estimated Blood Loss: none    Findings: 6 x 4 cm lipoma adherent to fascia    Complications: none        4/19/2017 12:12 PM David Rivas

## 2017-05-11 ENCOUNTER — TELEPHONE (OUTPATIENT)
Dept: MEDICAL GROUP | Age: 72
End: 2017-05-11

## 2017-05-11 NOTE — TELEPHONE ENCOUNTER
ESTABLISHED PATIENT PRE-VISIT PLANNING     Note: Patient will not be contacted if there is no indication to call.     1.  Reviewed note from last office visit with PCP and/or other med group provider: Yes    2.  If any orders were placed at last visit, do we have Results/Consult Notes?        •  Labs - Labs ordered, completed and results are in chart.       •  Imaging - Imaging ordered, NOT completed. Patient advised to complete prior to next appointment.       •  Referrals - No referrals were ordered at last office visit.    3.  Immunizations were updated in Epic using WebIZ?: Epic matches WebIZ       •  Web Iz Recommendations: HEPATITIS A  HEPATITIS B PNEUMOVAX (PPSV23) TDAP ZOSTAVAX (Shingles)    4.  Patient is due for the following Health Maintenance Topics:   Health Maintenance Due   Topic Date Due   • IMM DTaP/Tdap/Td Vaccine (1 - Tdap) 11/14/1964   • IMM ZOSTER VACCINE  11/14/2005   • Annual Wellness Visit  06/14/2014   • IMM PNEUMOCOCCAL 65+ (ADULT) LOW/MEDIUM RISK SERIES (2 of 2 - PPSV23) 02/24/2017       - Patient is up-to-date on all Health Maintenance topics. No records have been requested at this time.    5.  Patient was not informed to arrive 15 min prior to their scheduled appointment and bring in their medication bottles.

## 2017-05-12 ENCOUNTER — OFFICE VISIT (OUTPATIENT)
Dept: MEDICAL GROUP | Age: 72
End: 2017-05-12
Payer: MEDICARE

## 2017-05-12 VITALS
SYSTOLIC BLOOD PRESSURE: 104 MMHG | WEIGHT: 167.4 LBS | TEMPERATURE: 98.1 F | OXYGEN SATURATION: 98 % | HEIGHT: 68 IN | HEART RATE: 54 BPM | BODY MASS INDEX: 25.37 KG/M2 | DIASTOLIC BLOOD PRESSURE: 70 MMHG

## 2017-05-12 DIAGNOSIS — M47.812 DJD (DEGENERATIVE JOINT DISEASE), CERVICAL: ICD-10-CM

## 2017-05-12 DIAGNOSIS — M54.2 CHRONIC NECK PAIN: ICD-10-CM

## 2017-05-12 DIAGNOSIS — R07.89: ICD-10-CM

## 2017-05-12 DIAGNOSIS — R29.898 XIPHOID PROMINENCE: ICD-10-CM

## 2017-05-12 DIAGNOSIS — E03.4 HYPOTHYROIDISM DUE TO ACQUIRED ATROPHY OF THYROID: ICD-10-CM

## 2017-05-12 DIAGNOSIS — R73.01 IFG (IMPAIRED FASTING GLUCOSE): ICD-10-CM

## 2017-05-12 DIAGNOSIS — E78.2 MIXED HYPERLIPIDEMIA: ICD-10-CM

## 2017-05-12 DIAGNOSIS — K76.0 FATTY LIVER: ICD-10-CM

## 2017-05-12 DIAGNOSIS — G89.29 CHRONIC NECK PAIN: ICD-10-CM

## 2017-05-12 DIAGNOSIS — R10.13 ABDOMINAL PAIN, CHRONIC, EPIGASTRIC: ICD-10-CM

## 2017-05-12 DIAGNOSIS — G89.29 ABDOMINAL PAIN, CHRONIC, EPIGASTRIC: ICD-10-CM

## 2017-05-12 DIAGNOSIS — M54.12 CERVICAL RADICULAR PAIN: ICD-10-CM

## 2017-05-12 PROBLEM — R10.11 RUQ ABDOMINAL PAIN: Status: RESOLVED | Noted: 2017-03-29 | Resolved: 2017-05-12

## 2017-05-12 PROBLEM — Z79.899 MEDICATION MANAGEMENT: Status: RESOLVED | Noted: 2017-03-29 | Resolved: 2017-05-12

## 2017-05-12 PROCEDURE — 1101F PT FALLS ASSESS-DOCD LE1/YR: CPT | Performed by: INTERNAL MEDICINE

## 2017-05-12 PROCEDURE — 1036F TOBACCO NON-USER: CPT | Performed by: INTERNAL MEDICINE

## 2017-05-12 PROCEDURE — 3017F COLORECTAL CA SCREEN DOC REV: CPT | Performed by: INTERNAL MEDICINE

## 2017-05-12 PROCEDURE — G8419 CALC BMI OUT NRM PARAM NOF/U: HCPCS | Performed by: INTERNAL MEDICINE

## 2017-05-12 PROCEDURE — 4040F PNEUMOC VAC/ADMIN/RCVD: CPT | Performed by: INTERNAL MEDICINE

## 2017-05-12 PROCEDURE — G8432 DEP SCR NOT DOC, RNG: HCPCS | Performed by: INTERNAL MEDICINE

## 2017-05-12 PROCEDURE — 99214 OFFICE O/P EST MOD 30 MIN: CPT | Performed by: INTERNAL MEDICINE

## 2017-05-12 ASSESSMENT — ENCOUNTER SYMPTOMS
EYES NEGATIVE: 1
MUSCULOSKELETAL NEGATIVE: 1
RESPIRATORY NEGATIVE: 1
CARDIOVASCULAR NEGATIVE: 1
GASTROINTESTINAL NEGATIVE: 1
NEUROLOGICAL NEGATIVE: 1
CONSTITUTIONAL NEGATIVE: 1
PSYCHIATRIC NEGATIVE: 1

## 2017-05-12 NOTE — MR AVS SNAPSHOT
"        Dusty Eid   2017 8:20 AM   Office Visit   MRN: [id number]    Department:  [address] [place] [address]   Dept Phone:  [phone]    Description:  Male : 1945   Provider:  Marcial Mcfarlane M.D.           Reason for Visit     Thyroid Problem lab review      Allergies as of 2017     Allergen Noted Reactions    Codeine 2010   Nausea      You were diagnosed with     Abdominal pain, chronic, epigastric   [959417]       IFG (impaired fasting glucose)   [430103]       Hypothyroidism due to acquired atrophy of thyroid   [2698686]       Mixed hyperlipidemia   [272.2.ICD-9-CM]       Chronic neck pain   [633400]       DJD (degenerative joint disease), cervical   [980685]       Cervical radicular pain   [920794]       Fatty liver   [099916]       Xiphoid prominence   [386186]       Xiphoidalgia syndrome   [180517]         Vital Signs     Blood Pressure Pulse Temperature Height Weight Body Mass Index    104/70 mmHg 54 36.7 °C (98.1 °F) 1.727 m (5' 7.99\") 75.932 kg (167 lb 6.4 oz) 25.46 kg/m2    Oxygen Saturation Smoking Status                98% Never Smoker           Basic Information     Date Of Birth Sex Race Ethnicity Preferred Language    1945 Male White Non- English      Your appointments     2017  8:00 AM   Established Patient with Marcial Mcfarlane M.D.   [address] [id number] [phone]           You will be receiving a confirmation call a few days before your appointment from our automated call confirmation system.              Problem List              ICD-10-CM Priority Class Noted - Resolved    Hypothyroidism due to acquired atrophy of thyroid E03.4   2010 - Present    Chronic left hip pain- mild djd on xray M25.552, G89.29   2015 - Present    Mixed hyperlipidemia E78.2   2015 - Present    DJD (degenerative joint disease), cervical M50.30   2016 - Present    IFG " (impaired fasting glucose) R73.01   3/29/2017 - Present    Abdominal pain, chronic, epigastric R10.13, G89.29   5/12/2017 - Present    Chronic neck pain M54.2, G89.29   5/12/2017 - Present    Cervical radicular pain M54.12   5/12/2017 - Present    Fatty liver K76.0   5/12/2017 - Present    Xiphoid prominence R29.898   5/12/2017 - Present    Xiphoidalgia syndrome R07.89   5/12/2017 - Present      Health Maintenance        Date Due Completion Dates    IMM DTaP/Tdap/Td Vaccine (1 - Tdap) 11/14/1964 ---    IMM ZOSTER VACCINE 11/14/2005 ---    IMM PNEUMOCOCCAL 65+ (ADULT) LOW/MEDIUM RISK SERIES (2 of 2 - PPSV23) 2/24/2017 2/24/2016    COLONOSCOPY 2/12/2019 2/12/2014            Current Immunizations     13-VALENT PCV PREVNAR 2/24/2016 11:01 AM    Influenza Vaccine Adult HD 11/11/2016      Below and/or attached are the medications your provider expects you to take. Review all of your home medications and newly ordered medications with your provider and/or pharmacist. Follow medication instructions as directed by your provider and/or pharmacist. Please keep your medication list with you and share with your provider. Update the information when medications are discontinued, doses are changed, or new medications (including over-the-counter products) are added; and carry medication information at all times in the event of emergency situations     Allergies:  CODEINE - Nausea               Medications  Valid as of: May 12, 2017 -  9:03 AM    Generic Name Brand Name Tablet Size Instructions for use    Aspirin (Tablet Delayed Response) ECOTRIN 81 MG Take 81 mg by mouth every day.        Atorvastatin Calcium (Tab) LIPITOR 10 MG Take 1 Tab by mouth every day.        Levothyroxine Sodium (Tab) SYNTHROID 125 MCG TAKE 1 TABLET BY MOUTH EVERY DAY **ALLOW 24- 48HRS FOR REFILLS**        Meloxicam (Tab) MOBIC 15 MG Take 15 mg by mouth every day.        RaNITidine HCl   Take 1 Tab by mouth as needed.        .                 Medicines  prescribed today were sent to:     [id number] - [place], [place] - [address]    [address] [place] [address]    Phone: [phone] Fax: [phone]    Open 24 Hours?: No      Medication refill instructions:       If your prescription bottle indicates you have medication refills left, it is not necessary to call your provider’s office. Please contact your pharmacy and they will refill your medication.    If your prescription bottle indicates you do not have any refills left, you may request refills at any time through one of the following ways: The online [employer] system (except Urgent Care), by calling your provider’s office, or by asking your pharmacy to contact your provider’s office with a refill request. Medication refills are processed only during regular business hours and may not be available until the next business day. Your provider may request additional information or to have a follow-up visit with you prior to refilling your medication.   *Please Note: Medication refills are assigned a new Rx number when refilled electronically. Your pharmacy may indicate that no refills were authorized even though a new prescription for the same medication is available at the pharmacy. Please request the medicine by name with the pharmacy before contacting your provider for a refill.        Your To Do List     Future Labs/Procedures Complete By Expires    CBC WITH DIFFERENTIAL  As directed 5/12/2018    COMP METABOLIC PANEL  As directed 5/12/2018    CT-ABDOMEN WITH & W/O  As directed 11/11/2017    CT-ABDOMEN WITH & W/O  As directed 11/11/2017    LIPID PROFILE  As directed 5/12/2018    MR-CERVICAL SPINE-W/O  As directed 5/12/2018    TSH  As directed 5/12/2018      Referral     A referral request has been sent to our patient care coordination department. Please allow 3-5 business days for us to process this request and contact you either by phone or mail. If you do not hear from us by the 5th business day,  please call us at [phone].           [employer] Access Code: Activation code not generated  Current [employer] Status: Active

## 2017-05-12 NOTE — PROGRESS NOTES
"Subjective:      Dusty Eid is a 71 y.o. male who presents with Thyroid Problem  The patient is here for followup of chronic medical problems listed below. The patient is compliant with medications and having no side effects from them. Denies chest pain, abdominal pain, dyspnea, myalgias, or cough.             Patient Active Problem List    Diagnosis Date Noted   • Abdominal pain, chronic, epigastric 05/12/2017   • Chronic neck pain 05/12/2017   • Cervical radicular pain 05/12/2017   • Fatty liver 05/12/2017   • Xiphoid prominence 05/12/2017   • Xiphoidalgia syndrome 05/12/2017   • IFG (impaired fasting glucose) 03/29/2017   • DJD (degenerative joint disease), cervical 11/11/2016   • Chronic left hip pain- mild djd on xray 11/25/2015   • Mixed hyperlipidemia 11/25/2015   • Hypothyroidism due to acquired atrophy of thyroid 12/29/2010     Allergies   Allergen Reactions   • Codeine Nausea        Outpatient Prescriptions Prior to Visit   Medication Sig Dispense Refill   • RaNITidine HCl (ZANTAC PO) Take 1 Tab by mouth as needed.     • aspirin EC (ECOTRIN) 81 MG Tablet Delayed Response Take 81 mg by mouth every day.     • meloxicam (MOBIC) 15 MG tablet Take 15 mg by mouth every day.     • levothyroxine (SYNTHROID) 125 MCG Tab TAKE 1 TABLET BY MOUTH EVERY DAY **ALLOW 24- 48HRS FOR REFILLS** 90 Tab 4   • atorvastatin (LIPITOR) 10 MG Tab Take 1 Tab by mouth every day. 90 Tab 4     No facility-administered medications prior to visit.               Thyroid Problem        Review of Systems   Constitutional: Negative.    HENT: Negative.    Eyes: Negative.    Respiratory: Negative.    Cardiovascular: Negative.    Gastrointestinal: Negative.    Genitourinary: Negative.    Musculoskeletal: Negative.    Skin: Negative.    Neurological: Negative.    Endo/Heme/Allergies: Negative.    Psychiatric/Behavioral: Negative.           Objective:     /70 mmHg  Pulse 54  Temp(Src) 36.7 °C (98.1 °F)  Ht 1.727 m (5' 7.99\")  " Wt 75.932 kg (167 lb 6.4 oz)  BMI 25.46 kg/m2  SpO2 98%     Physical Exam   Constitutional: He is oriented to person, place, and time. He appears well-developed and well-nourished. No distress.   HENT:   Head: Normocephalic and atraumatic.   Right Ear: External ear normal.   Left Ear: External ear normal.   Mouth/Throat: Oropharynx is clear and moist. No oropharyngeal exudate.   Eyes: Conjunctivae and EOM are normal. Pupils are equal, round, and reactive to light. Right eye exhibits no discharge.   Neck: Normal range of motion. Neck supple. No JVD present. No tracheal deviation present. No thyromegaly present.   Cardiovascular: Normal rate, regular rhythm, normal heart sounds and intact distal pulses.  Exam reveals no gallop.    Pulmonary/Chest: Effort normal and breath sounds normal. No respiratory distress. He has no wheezes. He has no rales. He exhibits no tenderness.   Abdominal: Soft. Bowel sounds are normal. He exhibits no distension and no mass. There is no tenderness. There is no rebound and no guarding. No hernia.   Genitourinary: Guaiac negative stool. No penile tenderness.   Musculoskeletal: He exhibits no edema or tenderness.   Lymphadenopathy:     He has no cervical adenopathy.   Neurological: He is alert and oriented to person, place, and time. He has normal reflexes. He displays normal reflexes. No cranial nerve deficit. He exhibits normal muscle tone. Coordination normal.   Skin: Skin is warm and dry. No rash noted. He is not diaphoretic. No erythema. No pallor.   Psychiatric: He has a normal mood and affect. His behavior is normal. Judgment and thought content normal.   Nursing note and vitals reviewed.    Orders Only on 04/13/2017   Component Date Value   • Report 04/13/2017                      Value:[facility]    Test Date:  2017-04-13  Pt Name:    ERLIN SALDAÑA               Department: [facility]  MRN:        [id number]                      Room:  Gender:     M                             Technician: DADA  :        [id number]                   Requested By:ALEXX VOSS  Order #:    [id number]                    Reading MD: Sandra Tong MD    Measurements  Intervals                                Axis  Rate:       49                           P:          30  CA:         180                          QRS:        69  QRSD:       88                           T:          34  QT:         396  QTc:        358    Interpretive Statements  SINUS BRADYCARDIA  No previous ECG available for comparison    Electronically Signed On 2017 8:05:09 PDT by Sandra Tong MD     • WBC 2017 7.1    • RBC 2017 4.77    • Hemoglobin 2017 15.0    • Hematocrit 2017 44.9    • MCV 2017 94.1    • MCH 2017 31.4    • MCHC 2017 33.4*   • RDW 2017 44.9    • Platelet Count 2017 288    • MPV 2017 9.4    • Neutrophils-Polys 2017 57.80    • Lymphocytes 2017 24.00    • Monocytes 2017 11.10    • Eosinophils 2017 5.80    • Basophils 2017 1.00    • Immature Granulocytes 2017 0.30    • Nucleated RBC 2017 0.00    • Neutrophils (Absolute) 2017 4.10    • Lymphs (Absolute) 2017 1.70    • Monos (Absolute) 2017 0.79    • Eos (Absolute) 2017 0.41    • Baso (Absolute) 2017 0.07    • Immature Granulocytes (a* 2017 0.02    • NRBC (Absolute) 2017 0.00    • Sodium 2017 136    • Potassium 2017 3.9    • Chloride 2017 103    • Co2 2017 26    • Glucose 2017 93    • Bun 2017 15    • Creatinine 2017 1.11    • Calcium 2017 9.6    • Anion Gap 2017 7.0    • GFR If  2017 >60    • GFR If Non  Ameri* 2017 >60       No results found for: HBA1C  Lab Results   Component Value Date/Time    SODIUM 136 2017 03:12 PM    POTASSIUM 3.9 2017 03:12 PM    CHLORIDE 103 2017 03:12 PM    CO2 26 2017 03:12 PM    GLUCOSE 93  04/13/2017 03:12 PM    BUN 15 04/13/2017 03:12 PM    CREATININE 1.11 04/13/2017 03:12 PM    BUN-CREATININE RATIO 16 12/09/2010 07:25 AM    GLOM FILT RATE, EST >59 12/09/2010 07:25 AM    ALKALINE PHOSPHATASE 46 03/27/2017 06:52 AM    AST(SGOT) 26 03/27/2017 06:52 AM    ALT(SGPT) 24 03/27/2017 06:52 AM    TOTAL BILIRUBIN 0.4 03/27/2017 06:52 AM     No results found for: INR  Lab Results   Component Value Date/Time    CHOLESTEROL, 03/27/2017 06:52 AM    LDL 72 03/27/2017 06:52 AM    HDL 41 03/27/2017 06:52 AM    TRIGLYCERIDES 85 03/27/2017 06:52 AM       Lab Results   Component Value Date/Time    TESTOSTERONE,TOTAL 695 06/30/2014 10:29 AM     Lab Results   Component Value Date/Time    TSH 20.800* 03/29/2011 11:33 AM    TSH 20.440* 12/09/2010 07:25 AM     Lab Results   Component Value Date/Time    FREE T-4 0.97 10/25/2016 09:01 AM    FREE T-4 0.96 02/24/2016 11:16 AM     No results found for: URICACID  No components found for: VITB12  Lab Results   Component Value Date/Time    25-HYDROXY   VITAMIN D 25 44 10/25/2016 09:01 AM    25-HYDROXY   VITAMIN D 25 44.5 12/09/2010 07:25 AM                    Assessment/Plan:     1. Abdominal pain, chronic, epigastric        - CT-ABDOMEN WITH & W/O; Future  - REFERRAL TO   GASTROENTEROLOGY    2. IFG (impaired fasting glucose)    diet/exercise/lose 15 lbs.; patient counseled      3. Hypothyroidism due to acquired atrophy of thyroidUnder good control. Continue same regimen.        4. Mixed hyperlipidemia   - TSH; Future  - LIPID PROFILE; Future  - CBC WITH DIFFERENTIAL; Future  - COMP METABOLIC PANEL; Future    5. Chronic neck pain     - MR-CERVICAL SPINE-W/O; Future    6. DJD (degenerative joint disease), cervical     - MR-CERVICAL SPINE-W/O; Future    7. Cervical radicular pain     - MR-CERVICAL SPINE-W/O; Future    8. Fatty liver  diet/exercise/lose 15 lbs.; patient counseled         9. Xiphoid prominence-patient complains of a prominence for the past one year in the xiphoid  "area with an associated clicking painful sensation\" after eating in that same area. Says it was never there before. We'll get CT of that area to confirm this just benign xiphoid process. If negative reassurance. Also he'll get second opinion from gastroenterologist to whom he was referred for midepigastric pain.  - CT-ABDOMEN WITH & W/O; Future  - CT-ABDOMEN WITH & W/O; Future    10. Xiphoidalgia syndrome-as above. Handout given on xiphoid syndrome.     - CT-ABDOMEN WITH & W/O; Future  - CT-ABDOMEN WITH & W/O; Future      30 minute face-to-face encounter took place today.  More than half of this time was spent in the coordination of care of the above problems, as well as counseling.          "

## 2017-05-12 NOTE — Clinical Note
May 12, 2017        Dusty Eid  [address] [address]  [facility] [address]        Dear Dusty:       Current Outpatient Prescriptions   Medication Sig Dispense Refill   • RaNITidine HCl (ZANTAC PO) Take 1 Tab by mouth as needed.     • aspirin EC (ECOTRIN) 81 MG Tablet Delayed Response Take 81 mg by mouth every day.     • meloxicam (MOBIC) 15 MG tablet Take 15 mg by mouth every day.     • levothyroxine (SYNTHROID) 125 MCG Tab TAKE 1 TABLET BY MOUTH EVERY DAY **ALLOW 24- 48HRS FOR REFILLS** 90 Tab 4   • atorvastatin (LIPITOR) 10 MG Tab Take 1 Tab by mouth every day. 90 Tab 4     No current facility-administered medications for this visit.         If you have any questions or concerns, please don't hesitate to call.        Sincerely,        Marcial Mcfarlane M.D.    Electronically Signed

## 2017-05-23 ENCOUNTER — HOSPITAL ENCOUNTER (OUTPATIENT)
Dept: LAB | Facility: MEDICAL CENTER | Age: 72
End: 2017-05-23
Attending: INTERNAL MEDICINE
Payer: MEDICARE

## 2017-05-23 DIAGNOSIS — E78.2 MIXED HYPERLIPIDEMIA: ICD-10-CM

## 2017-05-23 DIAGNOSIS — E03.4 HYPOTHYROIDISM DUE TO ACQUIRED ATROPHY OF THYROID: ICD-10-CM

## 2017-05-23 LAB
ALBUMIN SERPL BCP-MCNC: 4 G/DL (ref 3.2–4.9)
ALBUMIN/GLOB SERPL: 1.3 G/DL
ALP SERPL-CCNC: 41 U/L (ref 30–99)
ALT SERPL-CCNC: 20 U/L (ref 2–50)
ANION GAP SERPL CALC-SCNC: 6 MMOL/L (ref 0–11.9)
AST SERPL-CCNC: 24 U/L (ref 12–45)
BASOPHILS # BLD AUTO: 1.5 % (ref 0–1.8)
BASOPHILS # BLD: 0.1 K/UL (ref 0–0.12)
BILIRUB SERPL-MCNC: 0.6 MG/DL (ref 0.1–1.5)
BUN SERPL-MCNC: 17 MG/DL (ref 8–22)
CALCIUM SERPL-MCNC: 9.8 MG/DL (ref 8.5–10.5)
CHLORIDE SERPL-SCNC: 106 MMOL/L (ref 96–112)
CHOLEST SERPL-MCNC: 144 MG/DL (ref 100–199)
CO2 SERPL-SCNC: 27 MMOL/L (ref 20–33)
CREAT SERPL-MCNC: 1.1 MG/DL (ref 0.5–1.4)
EOSINOPHIL # BLD AUTO: 0.24 K/UL (ref 0–0.51)
EOSINOPHIL NFR BLD: 3.7 % (ref 0–6.9)
ERYTHROCYTE [DISTWIDTH] IN BLOOD BY AUTOMATED COUNT: 45.5 FL (ref 35.9–50)
GFR SERPL CREATININE-BSD FRML MDRD: >60 ML/MIN/1.73 M 2
GLOBULIN SER CALC-MCNC: 3 G/DL (ref 1.9–3.5)
GLUCOSE SERPL-MCNC: 92 MG/DL (ref 65–99)
HCT VFR BLD AUTO: 45.1 % (ref 42–52)
HDLC SERPL-MCNC: 51 MG/DL
HGB BLD-MCNC: 15.2 G/DL (ref 14–18)
IMM GRANULOCYTES # BLD AUTO: 0.01 K/UL (ref 0–0.11)
IMM GRANULOCYTES NFR BLD AUTO: 0.2 % (ref 0–0.9)
LDLC SERPL CALC-MCNC: 75 MG/DL
LYMPHOCYTES # BLD AUTO: 1.78 K/UL (ref 1–4.8)
LYMPHOCYTES NFR BLD: 27.4 % (ref 22–41)
MCH RBC QN AUTO: 31.3 PG (ref 27–33)
MCHC RBC AUTO-ENTMCNC: 33.7 G/DL (ref 33.7–35.3)
MCV RBC AUTO: 92.8 FL (ref 81.4–97.8)
MONOCYTES # BLD AUTO: 0.55 K/UL (ref 0–0.85)
MONOCYTES NFR BLD AUTO: 8.5 % (ref 0–13.4)
NEUTROPHILS # BLD AUTO: 3.81 K/UL (ref 1.82–7.42)
NEUTROPHILS NFR BLD: 58.7 % (ref 44–72)
NRBC # BLD AUTO: 0 K/UL
NRBC BLD AUTO-RTO: 0 /100 WBC
PLATELET # BLD AUTO: 270 K/UL (ref 164–446)
PMV BLD AUTO: 9.7 FL (ref 9–12.9)
POTASSIUM SERPL-SCNC: 4.2 MMOL/L (ref 3.6–5.5)
PROT SERPL-MCNC: 7 G/DL (ref 6–8.2)
RBC # BLD AUTO: 4.86 M/UL (ref 4.7–6.1)
SODIUM SERPL-SCNC: 139 MMOL/L (ref 135–145)
TRIGL SERPL-MCNC: 92 MG/DL (ref 0–149)
TSH SERPL DL<=0.005 MIU/L-ACNC: 0.35 UIU/ML (ref 0.3–3.7)
WBC # BLD AUTO: 6.5 K/UL (ref 4.8–10.8)

## 2017-05-23 PROCEDURE — 84443 ASSAY THYROID STIM HORMONE: CPT

## 2017-05-23 PROCEDURE — 85025 COMPLETE CBC W/AUTO DIFF WBC: CPT

## 2017-05-23 PROCEDURE — 80053 COMPREHEN METABOLIC PANEL: CPT

## 2017-05-23 PROCEDURE — 36415 COLL VENOUS BLD VENIPUNCTURE: CPT

## 2017-05-23 PROCEDURE — 80061 LIPID PANEL: CPT

## 2017-05-26 ENCOUNTER — TELEPHONE (OUTPATIENT)
Dept: RADIOLOGY | Facility: MEDICAL CENTER | Age: 72
End: 2017-05-26

## 2017-05-29 ENCOUNTER — HOSPITAL ENCOUNTER (OUTPATIENT)
Dept: RADIOLOGY | Facility: MEDICAL CENTER | Age: 72
End: 2017-05-29
Attending: INTERNAL MEDICINE
Payer: MEDICARE

## 2017-05-29 DIAGNOSIS — M54.2 CHRONIC NECK PAIN: ICD-10-CM

## 2017-05-29 DIAGNOSIS — G89.29 CHRONIC NECK PAIN: ICD-10-CM

## 2017-05-29 DIAGNOSIS — R29.898 XIPHOID PROMINENCE: ICD-10-CM

## 2017-05-29 DIAGNOSIS — M47.812 DJD (DEGENERATIVE JOINT DISEASE), CERVICAL: ICD-10-CM

## 2017-05-29 DIAGNOSIS — M54.12 CERVICAL RADICULAR PAIN: ICD-10-CM

## 2017-05-29 DIAGNOSIS — R07.89: ICD-10-CM

## 2017-05-29 DIAGNOSIS — G89.29 ABDOMINAL PAIN, CHRONIC, EPIGASTRIC: ICD-10-CM

## 2017-05-29 DIAGNOSIS — R10.13 ABDOMINAL PAIN, CHRONIC, EPIGASTRIC: ICD-10-CM

## 2017-05-29 PROCEDURE — 72141 MRI NECK SPINE W/O DYE: CPT

## 2017-05-29 PROCEDURE — 74160 CT ABDOMEN W/CONTRAST: CPT

## 2017-05-29 PROCEDURE — 700117 HCHG RX CONTRAST REV CODE 255: Performed by: INTERNAL MEDICINE

## 2017-05-29 RX ADMIN — IOHEXOL 100 ML: 350 INJECTION, SOLUTION INTRAVENOUS at 09:45

## 2017-05-30 ENCOUNTER — TELEPHONE (OUTPATIENT)
Dept: MEDICAL GROUP | Age: 72
End: 2017-05-30

## 2017-05-30 DIAGNOSIS — M54.12 CERVICAL RADICULOPATHY AT C5: ICD-10-CM

## 2017-05-30 NOTE — TELEPHONE ENCOUNTER
----- Message from Marcial Mcfarlane M.D. sent at 5/30/2017  8:08 AM PDT -----  Ref to neurosurgery for pinched nerve at C4-5

## 2017-05-30 NOTE — Clinical Note
May 31, 2017        Dusty Eid  [address] [address]  [place] [address]        Dear Dusty:    Our office has attempted to contact you to notify you that Dr. Mcfarlane placed a referral to neurosurgery for a pinched nerve at C4-5. You should be receiving a call from the referral department in 3-5 business days to set up an appointment with you.    If you have any questions or concerns, please don't hesitate to call.        Sincerely,        Jacques Gay Ass't      Electronically Signed

## 2017-05-30 NOTE — TELEPHONE ENCOUNTER
Phone Number Called: [phone] (home)     Message: Tried to call patient but phone kept ringing. Will try again later.    Left Message for patient to call back: no

## 2017-05-31 NOTE — TELEPHONE ENCOUNTER
Phone Number Called: [phone] (home)     Message: Left VM for patient to call us back regarding referral placed. Letter mailed to home address with same information     Left Message for patient to call back: yes

## 2017-11-28 ENCOUNTER — PATIENT OUTREACH (OUTPATIENT)
Dept: HEALTH INFORMATION MANAGEMENT | Facility: OTHER | Age: 72
End: 2017-11-28

## 2017-11-28 NOTE — PROGRESS NOTES
Attempt #:1    WebIZ Checked & Epic Updated: Yes  · WebIZ Recommendations: FLU, PNEUMOVAX (PPSV23), TDAP and ZOSTAVAX (Shingles)  · Is patient due for Tdap? YES. Patient was not notified of copay/out of pocket cost.  · Is patient due for Shingles? YES. Patient was not notified of copay/out of pocket cost.  [facility] Verified: no  Verify PCP: yes    Communication Preference Obtained: yes     Review Care Team: yes    Annual Wellness Visit Scheduling  1. Scheduling Status:Not Scheduled. Patient states they are not interested        Care Gap Scheduling (Attempt to Schedule EACH Overdue Care Gap!)     Health Maintenance Due   Topic Date Due   • IMM DTaP/Tdap/Td Vaccine (1 - Tdap) 11/14/1964   • IMM ZOSTER VACCINE  11/14/2005   • Annual Wellness Visit  06/14/2014   • IMM PNEUMOCOCCAL 65+ (ADULT) LOW/MEDIUM RISK SERIES (2 of 2 - PPSV23) 02/24/2017   • IMM INFLUENZA (1) 09/01/2017        Patient declined Immunizations: FLU, PNEUMOVAX (PPSV23), TDAP and ZOSTAVAX (Shingles).       Magazinga Activation: already active  Magazinga Tamiko: no  Virtual Visits: no  Opt In to Text Messages: no

## 2017-11-30 ENCOUNTER — TELEPHONE (OUTPATIENT)
Dept: MEDICAL GROUP | Age: 72
End: 2017-11-30

## 2017-11-30 DIAGNOSIS — E78.2 MIXED HYPERLIPIDEMIA: ICD-10-CM

## 2017-11-30 DIAGNOSIS — R73.01 IFG (IMPAIRED FASTING GLUCOSE): ICD-10-CM

## 2017-11-30 DIAGNOSIS — E03.4 HYPOTHYROIDISM DUE TO ACQUIRED ATROPHY OF THYROID: ICD-10-CM

## 2017-11-30 NOTE — TELEPHONE ENCOUNTER
1. Caller Name: Dusty Eid                                           Call Back Number: [phone] (home)         Patient approves a detailed voicemail message: N\A    Pt is requesting some orders for blood work before he comes in for an appt.

## 2017-12-01 DIAGNOSIS — E03.4 HYPOTHYROIDISM DUE TO ACQUIRED ATROPHY OF THYROID: ICD-10-CM

## 2017-12-01 DIAGNOSIS — E78.2 MIXED HYPERLIPIDEMIA: ICD-10-CM

## 2017-12-04 RX ORDER — ATORVASTATIN CALCIUM 10 MG/1
TABLET, FILM COATED ORAL
Qty: 90 TAB | Refills: 4 | Status: SHIPPED | OUTPATIENT
Start: 2017-12-04 | End: 2018-07-09 | Stop reason: SDUPTHER

## 2017-12-04 RX ORDER — LEVOTHYROXINE SODIUM 0.12 MG/1
TABLET ORAL
Qty: 90 TAB | Refills: 4 | Status: SHIPPED | OUTPATIENT
Start: 2017-12-04 | End: 2018-07-09 | Stop reason: SDUPTHER

## 2018-01-04 ENCOUNTER — OFFICE VISIT (OUTPATIENT)
Dept: MEDICAL GROUP | Age: 73
End: 2018-01-04
Payer: MEDICARE

## 2018-01-04 VITALS
TEMPERATURE: 97.9 F | WEIGHT: 165 LBS | HEIGHT: 68 IN | HEART RATE: 82 BPM | DIASTOLIC BLOOD PRESSURE: 60 MMHG | SYSTOLIC BLOOD PRESSURE: 110 MMHG | OXYGEN SATURATION: 95 % | BODY MASS INDEX: 25.01 KG/M2

## 2018-01-04 DIAGNOSIS — Z23 NEED FOR PNEUMOCOCCAL VACCINATION: ICD-10-CM

## 2018-01-04 DIAGNOSIS — R73.01 IFG (IMPAIRED FASTING GLUCOSE): ICD-10-CM

## 2018-01-04 DIAGNOSIS — D48.5 NEOPLASM OF UNCERTAIN BEHAVIOR OF SKIN: ICD-10-CM

## 2018-01-04 DIAGNOSIS — E78.2 MIXED HYPERLIPIDEMIA: ICD-10-CM

## 2018-01-04 DIAGNOSIS — J41.1 BRONCHITIS, MUCOPURULENT RECURRENT (HCC): ICD-10-CM

## 2018-01-04 DIAGNOSIS — E03.4 HYPOTHYROIDISM DUE TO ACQUIRED ATROPHY OF THYROID: ICD-10-CM

## 2018-01-04 PROCEDURE — G0009 ADMIN PNEUMOCOCCAL VACCINE: HCPCS | Performed by: INTERNAL MEDICINE

## 2018-01-04 PROCEDURE — 90732 PPSV23 VACC 2 YRS+ SUBQ/IM: CPT | Performed by: INTERNAL MEDICINE

## 2018-01-04 PROCEDURE — 99214 OFFICE O/P EST MOD 30 MIN: CPT | Mod: 25 | Performed by: INTERNAL MEDICINE

## 2018-01-04 RX ORDER — AMOXICILLIN AND CLAVULANATE POTASSIUM 875; 125 MG/1; MG/1
1 TABLET, FILM COATED ORAL 2 TIMES DAILY
Qty: 30 TAB | Refills: 1 | Status: SHIPPED | OUTPATIENT
Start: 2018-01-04 | End: 2018-01-08

## 2018-01-04 ASSESSMENT — ENCOUNTER SYMPTOMS
MUSCULOSKELETAL NEGATIVE: 1
NEUROLOGICAL NEGATIVE: 1
CONSTITUTIONAL NEGATIVE: 1
RESPIRATORY NEGATIVE: 1
CARDIOVASCULAR NEGATIVE: 1
GASTROINTESTINAL NEGATIVE: 1
EYES NEGATIVE: 1
PSYCHIATRIC NEGATIVE: 1

## 2018-01-04 ASSESSMENT — PATIENT HEALTH QUESTIONNAIRE - PHQ9: CLINICAL INTERPRETATION OF PHQ2 SCORE: 0

## 2018-01-05 NOTE — PROGRESS NOTES
Subjective:      Dusty Edi is a 72 y.o. male who presents with Hypothyroidism (evaluation )  The patient is here for followup of chronic medical problems listed below. The patient is compliant with medications and having no side effects from them. Denies chest pain, abdominal pain, dyspnea, myalgias, or cough.   Patient Active Problem List    Diagnosis Date Noted   • Bronchitis, mucopurulent recurrent (CMS-HCC) 01/04/2018   • Neoplasm of uncertain behavior of skin 01/04/2018   • Abdominal pain, chronic, epigastric 05/12/2017   • Chronic neck pain 05/12/2017   • Cervical radicular pain 05/12/2017   • Fatty liver 05/12/2017   • Xiphoid prominence 05/12/2017   • Xiphoidalgia syndrome 05/12/2017   • IFG (impaired fasting glucose) 03/29/2017   • DJD (degenerative joint disease), cervical 11/11/2016   • Chronic left hip pain- mild djd on xray 11/25/2015   • Mixed hyperlipidemia 11/25/2015   • Hypothyroidism due to acquired atrophy of thyroid 12/29/2010     Allergies   Allergen Reactions   • Codeine Nausea     Outpatient Medications Prior to Visit   Medication Sig Dispense Refill   • levothyroxine (SYNTHROID) 125 MCG Tab TAKE 1 TABLET BY MOUTH EVERY DAY **ALLOW 24- 48HRS FOR REFILLS** 90 Tab 4   • atorvastatin (LIPITOR) 10 MG Tab TAKE 1 TABLET BY MOUTH EVERYDAY 90 Tab 4   • aspirin EC (ECOTRIN) 81 MG Tablet Delayed Response Take 81 mg by mouth every day.     • RaNITidine HCl (ZANTAC PO) Take 1 Tab by mouth as needed.     • meloxicam (MOBIC) 15 MG tablet Take 15 mg by mouth every day.       No facility-administered medications prior to visit.                HPI    Review of Systems   Constitutional: Negative.    HENT: Negative.    Eyes: Negative.    Respiratory: Negative.    Cardiovascular: Negative.    Gastrointestinal: Negative.    Genitourinary: Negative.    Musculoskeletal: Negative.    Skin: Negative.    Neurological: Negative.    Endo/Heme/Allergies: Negative.    Psychiatric/Behavioral: Negative.            "Objective:     /60   Pulse 82   Temp 36.6 °C (97.9 °F)   Ht 1.727 m (5' 7.99\")   Wt 74.8 kg (165 lb)   SpO2 95%   BMI 25.09 kg/m²      Physical Exam   Constitutional: He is oriented to person, place, and time. He appears well-developed and well-nourished. No distress.   HENT:   Head: Normocephalic and atraumatic.   Right Ear: External ear normal.   Left Ear: External ear normal.   Nose: Nose normal.   Mouth/Throat: Oropharynx is clear and moist. No oropharyngeal exudate.   Eyes: Conjunctivae and EOM are normal. Pupils are equal, round, and reactive to light. Right eye exhibits no discharge. Left eye exhibits no discharge. No scleral icterus.   Neck: Normal range of motion. Neck supple. No JVD present. No tracheal deviation present. No thyromegaly present.   Cardiovascular: Normal rate, regular rhythm, normal heart sounds and intact distal pulses.  Exam reveals no gallop and no friction rub.    No murmur heard.  Pulmonary/Chest: Effort normal and breath sounds normal. No stridor. No respiratory distress. He has no wheezes. He has no rales. He exhibits no tenderness.   Abdominal: Soft. Bowel sounds are normal. He exhibits no distension and no mass. There is no tenderness. There is no rebound and no guarding.   Musculoskeletal: Normal range of motion. He exhibits no edema or tenderness.   Lymphadenopathy:     He has no cervical adenopathy.   Neurological: He is alert and oriented to person, place, and time. He has normal reflexes. He displays normal reflexes. He exhibits normal muscle tone. Coordination normal.   Skin: Skin is warm and dry. No rash noted. He is not diaphoretic. No erythema. No pallor.   Psychiatric: He has a normal mood and affect. His behavior is normal. Judgment and thought content normal.     No visits with results within 1 Month(s) from this visit.   Latest known visit with results is:   Hospital Outpatient Visit on 05/23/2017   Component Date Value   • Cholesterol,Tot 05/23/2017 144  "   • Triglycerides 05/23/2017 92    • HDL 05/23/2017 51    • LDL 05/23/2017 75    • WBC 05/23/2017 6.5    • RBC 05/23/2017 4.86    • Hemoglobin 05/23/2017 15.2    • Hematocrit 05/23/2017 45.1    • MCV 05/23/2017 92.8    • MCH 05/23/2017 31.3    • MCHC 05/23/2017 33.7    • RDW 05/23/2017 45.5    • Platelet Count 05/23/2017 270    • MPV 05/23/2017 9.7    • Neutrophils-Polys 05/23/2017 58.70    • Lymphocytes 05/23/2017 27.40    • Monocytes 05/23/2017 8.50    • Eosinophils 05/23/2017 3.70    • Basophils 05/23/2017 1.50    • Immature Granulocytes 05/23/2017 0.20    • Nucleated RBC 05/23/2017 0.00    • Neutrophils (Absolute) 05/23/2017 3.81    • Lymphs (Absolute) 05/23/2017 1.78    • Monos (Absolute) 05/23/2017 0.55    • Eos (Absolute) 05/23/2017 0.24    • Baso (Absolute) 05/23/2017 0.10    • Immature Granulocytes (a* 05/23/2017 0.01    • NRBC (Absolute) 05/23/2017 0.00    • Sodium 05/23/2017 139    • Potassium 05/23/2017 4.2    • Chloride 05/23/2017 106    • Co2 05/23/2017 27    • Anion Gap 05/23/2017 6.0    • Glucose 05/23/2017 92    • Bun 05/23/2017 17    • Creatinine 05/23/2017 1.10    • Calcium 05/23/2017 9.8    • AST(SGOT) 05/23/2017 24    • ALT(SGPT) 05/23/2017 20    • Alkaline Phosphatase 05/23/2017 41    • Total Bilirubin 05/23/2017 0.6    • Albumin 05/23/2017 4.0    • Total Protein 05/23/2017 7.0    • Globulin 05/23/2017 3.0    • A-G Ratio 05/23/2017 1.3    • TSH 05/23/2017 0.350    • GFR If  05/23/2017 >60    • GFR If Non  Ameri* 05/23/2017 >60       No results found for: HBA1C  Lab Results   Component Value Date/Time    SODIUM 139 05/23/2017 11:39 AM    POTASSIUM 4.2 05/23/2017 11:39 AM    CHLORIDE 106 05/23/2017 11:39 AM    CO2 27 05/23/2017 11:39 AM    GLUCOSE 92 05/23/2017 11:39 AM    BUN 17 05/23/2017 11:39 AM    CREATININE 1.10 05/23/2017 11:39 AM    CREATININE 1.15 12/09/2010 07:25 AM    BUNCREATRAT 16 12/09/2010 07:25 AM    GLOMRATE >59 12/09/2010 07:25 AM    ALKPHOSPHAT 41  05/23/2017 11:39 AM    ASTSGOT 24 05/23/2017 11:39 AM    ALTSGPT 20 05/23/2017 11:39 AM    TBILIRUBIN 0.6 05/23/2017 11:39 AM     No results found for: INR  Lab Results   Component Value Date/Time    CHOLSTRLTOT 144 05/23/2017 11:39 AM    LDL 75 05/23/2017 11:39 AM    HDL 51 05/23/2017 11:39 AM    TRIGLYCERIDE 92 05/23/2017 11:39 AM       Lab Results   Component Value Date/Time    TESTOSTERONE 695 06/30/2014 10:29 AM     Lab Results   Component Value Date/Time    TSH 20.800 (H) 03/29/2011 11:33 AM    TSH 20.440 (H) 12/09/2010 07:25 AM     Lab Results   Component Value Date/Time    FREET4 0.97 10/25/2016 09:01 AM    FREET4 0.96 02/24/2016 11:16 AM     No results found for: URICACID  No components found for: VITB12  Lab Results   Component Value Date/Time    25HYDROXY 44 10/25/2016 09:01 AM    25HYDROXY 44.5 12/09/2010 07:25 AM                 Assessment/Plan:     1. Need for pneumococcal vaccination          - Pneumococal Polysaccharide Vaccine 23-Valent =>1yo SQ/IM  - TSH; Future    2. Hypothyroidism due to acquired atrophy of thyroid   Under good control. Continue same regimen.  3. Mixed hyperlipidemia     - COMP METUnder good control. Continue same regimen.ABOLIC PANEL; Future  - LIPID PROFILE; Future  - TSH; Future  - CBC WITH DIFFERENTIAL; Future    4. IFG (impaired fasting glucose)    Under good control. Continue same regimen.    5. Bronchitis, mucopurulent recurrent (CMS-HCC)     - amoxicillin-clavulanate (AUGMENTIN) 875-125 MG Tab; Take 1 Tab by mouth 2 times a day.  Dispense: 30 Tab; Refill: 1    6. Neoplasm of uncertain behavior of skin     - REFERRAL TO DERMATOLOGY    30 minute face-to-face encounter took place today.  More than half of this time was spent in the coordination of care of the above problems, as well as counseling.

## 2018-01-08 ENCOUNTER — TELEPHONE (OUTPATIENT)
Dept: MEDICAL GROUP | Age: 73
End: 2018-01-08

## 2018-01-08 DIAGNOSIS — J20.9 ACUTE BRONCHITIS, UNSPECIFIED ORGANISM: ICD-10-CM

## 2018-01-08 RX ORDER — AMOXICILLIN 875 MG/1
875 TABLET, COATED ORAL 2 TIMES DAILY
Qty: 30 TAB | Refills: 0 | Status: SHIPPED | OUTPATIENT
Start: 2018-01-08 | End: 2018-07-09

## 2018-01-08 NOTE — TELEPHONE ENCOUNTER
1. Caller Name: María @ [facility]                      Call Back Number: [phone]    2. Message: Pt will not accept Augmentin says he will accept Amoxicillin only. Per María @ [facility], Please resend Rx for Amoxicillin.     3. Patient approves office to leave a detailed voicemail/MyChart message: no

## 2018-03-20 ENCOUNTER — OFFICE VISIT (OUTPATIENT)
Dept: DERMATOLOGY | Facility: IMAGING CENTER | Age: 73
End: 2018-03-20
Payer: MEDICARE

## 2018-03-20 VITALS — WEIGHT: 160 LBS | BODY MASS INDEX: 24.25 KG/M2 | TEMPERATURE: 97 F | HEIGHT: 68 IN

## 2018-03-20 DIAGNOSIS — L57.0 ACTINIC KERATOSES: ICD-10-CM

## 2018-03-20 DIAGNOSIS — L81.4 LENTIGINES: ICD-10-CM

## 2018-03-20 DIAGNOSIS — R20.9 DISTURBANCE OF SKIN SENSATION: ICD-10-CM

## 2018-03-20 DIAGNOSIS — L82.1 SEBORRHEIC KERATOSES: ICD-10-CM

## 2018-03-20 DIAGNOSIS — L82.0 SEBORRHEIC KERATOSES, INFLAMED: ICD-10-CM

## 2018-03-20 PROCEDURE — 99203 OFFICE O/P NEW LOW 30 MIN: CPT | Mod: 25 | Performed by: DERMATOLOGY

## 2018-03-20 PROCEDURE — 17003 DESTRUCT PREMALG LES 2-14: CPT | Performed by: DERMATOLOGY

## 2018-03-20 PROCEDURE — 17110 DESTRUCTION B9 LES UP TO 14: CPT | Performed by: DERMATOLOGY

## 2018-03-20 PROCEDURE — 17000 DESTRUCT PREMALG LESION: CPT | Mod: 59 | Performed by: DERMATOLOGY

## 2018-03-20 ASSESSMENT — ENCOUNTER SYMPTOMS
FEVER: 0
CHILLS: 0

## 2018-03-20 NOTE — PROGRESS NOTES
Dermatology New Patient Visit    Chief Complaint   Patient presents with   • Establish Care       Subjective:     HPI:   Dusty Eid is a 72 y.o. male presenting for    Full skin check  Notes dry patches on the scalp  x 8 months  Areas are rough  No itching/bleeding/pain  Has tried using aloe vera gel, tea tree oil, amlactin, dias's lotion and mederma with some improvement    Brown spots on the side of the head, both sides  Have been present for >5 years  Have gotten thicker over the years  Get traumatized often, become itchy/irritating  No treatments    Brown spots around the neck  Present for 5-10 years  Has acquired several more over the years  No itching/bleeding/pain  No treatments    History of skin cancer: No  History of biopsies:yes, back, benign  History of blistering/severe sunburns:No  Family history of skin cancer:No  Family history of atypical moles:Yes, Details: chest area          Past Medical History:   Diagnosis Date   • Cold 3/2017    has not resolved/cxr ordered   • Erectile dysfunction    • Heart burn     L arm   • Hyperlipidemia    • Indigestion    • Infectious disease 3/2017    flu   • Renal disorder     kidney stones   • Thyroid disease        Current Outpatient Prescriptions on File Prior to Visit   Medication Sig Dispense Refill   • amoxicillin (AMOXIL) 875 MG tablet Take 1 Tab by mouth 2 times a day. 30 Tab 0   • levothyroxine (SYNTHROID) 125 MCG Tab TAKE 1 TABLET BY MOUTH EVERY DAY **ALLOW 24- 48HRS FOR REFILLS** 90 Tab 4   • atorvastatin (LIPITOR) 10 MG Tab TAKE 1 TABLET BY MOUTH EVERYDAY 90 Tab 4   • RaNITidine HCl (ZANTAC PO) Take 1 Tab by mouth as needed.     • aspirin EC (ECOTRIN) 81 MG Tablet Delayed Response Take 81 mg by mouth every day.     • meloxicam (MOBIC) 15 MG tablet Take 15 mg by mouth every day.       No current facility-administered medications on file prior to visit.        Allergies   Allergen Reactions   • Codeine Nausea       Family History   Problem  "Relation Age of Onset   • Cancer Father        Social History     Social History   • Marital status:      Spouse name: N/A   • Number of children: N/A   • Years of education: N/A     Occupational History   • Not on file.     Social History Main Topics   • Smoking status: Never Smoker   • Smokeless tobacco: Never Used   • Alcohol use 0.0 oz/week      Comment: Occasional wine    • Drug use: No   • Sexual activity: Not on file     Other Topics Concern   • Not on file     Social History Narrative   • No narrative on file       Review of Systems   Constitutional: Negative for chills and fever.   Skin: Negative for itching and rash.   All other systems reviewed and are negative.       Objective:     A full mucocutaneous exam was completed including: scalp, hair, ears, face, eyelids, conjunctiva, lips, gums/tongue/oropharynx, neck, chest, abdomen, back, left and right upper extremities (including hands/digits and fingernails), left and right lower extremities (including feet/toes, toenails), buttocks, excluding external genitalia (patient refusal) with the following pertinent findings listed below. Remaining above-listed examined areas within normal limits / negative for rashes or lesions.    Temperature 36.1 °C (97 °F), height 1.727 m (5' 8\"), weight 72.6 kg (160 lb).    Physical Exam   Constitutional: He is oriented to person, place, and time and well-developed, well-nourished, and in no distress.   HENT:   Head: Normocephalic and atraumatic.       Right Ear: External ear normal.   Left Ear: External ear normal.   Nose: Nose normal.   Mouth/Throat: Oropharynx is clear and moist.   Eyes: Conjunctivae and lids are normal.   Neck: Normal range of motion. Neck supple.   Cardiovascular: Intact distal pulses.    Pulmonary/Chest: Effort normal.   Neurological: He is alert and oriented to person, place, and time.   Skin: Skin is warm and dry.        Psychiatric: Mood and affect normal.   Vitals reviewed.      DATA: none " applicable to review    Assessment and Plan:     1. Actinic keratoses - right ear, face, scalp  - educated patient about diagnosis, management options, and expectations of treatment  CRYOTHERAPY:  Discussed risks and benefits of cryotherapy. Patient verbally agreed. 2 applications of cryotherapy were applied to 10 lesions on the face, scalp. Patient tolerated procedure well. Aftercare instructions given.  - discussed importance of regular sun protection/sunscreen use, SPF 30 or greater with broad spectrum coverage, need for reapplication every  minutes  - will likely treat with 5-FU or Aldara in the fall    2. Seborrheic keratoses, inflamed, +Disturbance of skin sensation  CRYOTHERAPY:  Discussed risks and benefits of cryotherapy. Patient verbally agreed. 2 applications of cryotherapy were applied to 4 lesions on the temples/temporal scalp. Patient tolerated procedure well. Aftercare instructions given.    3. Seborrheic keratoses  - Benign-appearing nature of lesions discussed. Advised to return to clinic for any new or concerning changes.  - ABCDE's of melanoma discussed    4. Lentigines  - Benign-appearing nature of lesions discussed. Advised to return to clinic for any new or concerning changes.  - ABCDE's of melanoma discussed    Followup: Return for f/u AKs fall 2018, 15.    Trinidad Buck M.D.

## 2018-07-02 ENCOUNTER — HOSPITAL ENCOUNTER (OUTPATIENT)
Dept: LAB | Facility: MEDICAL CENTER | Age: 73
End: 2018-07-02
Attending: INTERNAL MEDICINE
Payer: MEDICARE

## 2018-07-02 DIAGNOSIS — Z23 NEED FOR PNEUMOCOCCAL VACCINATION: ICD-10-CM

## 2018-07-02 DIAGNOSIS — R73.01 IFG (IMPAIRED FASTING GLUCOSE): ICD-10-CM

## 2018-07-02 DIAGNOSIS — E78.2 MIXED HYPERLIPIDEMIA: ICD-10-CM

## 2018-07-02 LAB
ALBUMIN SERPL BCP-MCNC: 4.1 G/DL (ref 3.2–4.9)
ALBUMIN/GLOB SERPL: 1.4 G/DL
ALP SERPL-CCNC: 35 U/L (ref 30–99)
ALT SERPL-CCNC: 20 U/L (ref 2–50)
ANION GAP SERPL CALC-SCNC: 7 MMOL/L (ref 0–11.9)
AST SERPL-CCNC: 25 U/L (ref 12–45)
BASOPHILS # BLD AUTO: 1 % (ref 0–1.8)
BASOPHILS # BLD: 0.06 K/UL (ref 0–0.12)
BILIRUB SERPL-MCNC: 0.7 MG/DL (ref 0.1–1.5)
BUN SERPL-MCNC: 16 MG/DL (ref 8–22)
CALCIUM SERPL-MCNC: 9.6 MG/DL (ref 8.5–10.5)
CHLORIDE SERPL-SCNC: 105 MMOL/L (ref 96–112)
CHOLEST SERPL-MCNC: 179 MG/DL (ref 100–199)
CO2 SERPL-SCNC: 28 MMOL/L (ref 20–33)
CREAT SERPL-MCNC: 1.13 MG/DL (ref 0.5–1.4)
EOSINOPHIL # BLD AUTO: 0.32 K/UL (ref 0–0.51)
EOSINOPHIL NFR BLD: 5.2 % (ref 0–6.9)
ERYTHROCYTE [DISTWIDTH] IN BLOOD BY AUTOMATED COUNT: 46.4 FL (ref 35.9–50)
EST. AVERAGE GLUCOSE BLD GHB EST-MCNC: 120 MG/DL
GLOBULIN SER CALC-MCNC: 2.9 G/DL (ref 1.9–3.5)
GLUCOSE SERPL-MCNC: 95 MG/DL (ref 65–99)
HBA1C MFR BLD: 5.8 % (ref 0–5.6)
HCT VFR BLD AUTO: 44.3 % (ref 42–52)
HDLC SERPL-MCNC: 40 MG/DL
HGB BLD-MCNC: 14.9 G/DL (ref 14–18)
IMM GRANULOCYTES # BLD AUTO: 0.02 K/UL (ref 0–0.11)
IMM GRANULOCYTES NFR BLD AUTO: 0.3 % (ref 0–0.9)
LDLC SERPL CALC-MCNC: 112 MG/DL
LYMPHOCYTES # BLD AUTO: 1.81 K/UL (ref 1–4.8)
LYMPHOCYTES NFR BLD: 29.2 % (ref 22–41)
MCH RBC QN AUTO: 31.7 PG (ref 27–33)
MCHC RBC AUTO-ENTMCNC: 33.6 G/DL (ref 33.7–35.3)
MCV RBC AUTO: 94.3 FL (ref 81.4–97.8)
MONOCYTES # BLD AUTO: 0.71 K/UL (ref 0–0.85)
MONOCYTES NFR BLD AUTO: 11.5 % (ref 0–13.4)
NEUTROPHILS # BLD AUTO: 3.27 K/UL (ref 1.82–7.42)
NEUTROPHILS NFR BLD: 52.8 % (ref 44–72)
NRBC # BLD AUTO: 0 K/UL
NRBC BLD-RTO: 0 /100 WBC
PLATELET # BLD AUTO: 269 K/UL (ref 164–446)
PMV BLD AUTO: 9.4 FL (ref 9–12.9)
POTASSIUM SERPL-SCNC: 4.1 MMOL/L (ref 3.6–5.5)
PROT SERPL-MCNC: 7 G/DL (ref 6–8.2)
RBC # BLD AUTO: 4.7 M/UL (ref 4.7–6.1)
SODIUM SERPL-SCNC: 140 MMOL/L (ref 135–145)
TRIGL SERPL-MCNC: 135 MG/DL (ref 0–149)
TSH SERPL DL<=0.005 MIU/L-ACNC: 0.41 UIU/ML (ref 0.38–5.33)
WBC # BLD AUTO: 6.2 K/UL (ref 4.8–10.8)

## 2018-07-02 PROCEDURE — 80061 LIPID PANEL: CPT

## 2018-07-02 PROCEDURE — 83036 HEMOGLOBIN GLYCOSYLATED A1C: CPT

## 2018-07-02 PROCEDURE — 80053 COMPREHEN METABOLIC PANEL: CPT

## 2018-07-02 PROCEDURE — 84443 ASSAY THYROID STIM HORMONE: CPT

## 2018-07-02 PROCEDURE — 85025 COMPLETE CBC W/AUTO DIFF WBC: CPT

## 2018-07-02 PROCEDURE — 36415 COLL VENOUS BLD VENIPUNCTURE: CPT

## 2018-07-09 ENCOUNTER — OFFICE VISIT (OUTPATIENT)
Dept: MEDICAL GROUP | Age: 73
End: 2018-07-09
Payer: MEDICARE

## 2018-07-09 VITALS
HEIGHT: 68 IN | DIASTOLIC BLOOD PRESSURE: 70 MMHG | HEART RATE: 66 BPM | TEMPERATURE: 98.5 F | SYSTOLIC BLOOD PRESSURE: 118 MMHG | OXYGEN SATURATION: 93 % | BODY MASS INDEX: 24.71 KG/M2 | WEIGHT: 163 LBS

## 2018-07-09 DIAGNOSIS — R73.01 IFG (IMPAIRED FASTING GLUCOSE): ICD-10-CM

## 2018-07-09 DIAGNOSIS — N52.8 OTHER MALE ERECTILE DYSFUNCTION: ICD-10-CM

## 2018-07-09 DIAGNOSIS — E78.2 MIXED HYPERLIPIDEMIA: ICD-10-CM

## 2018-07-09 DIAGNOSIS — E03.4 HYPOTHYROIDISM DUE TO ACQUIRED ATROPHY OF THYROID: ICD-10-CM

## 2018-07-09 PROBLEM — R10.13 ABDOMINAL PAIN, CHRONIC, EPIGASTRIC: Status: RESOLVED | Noted: 2017-05-12 | Resolved: 2018-07-09

## 2018-07-09 PROBLEM — G89.29 ABDOMINAL PAIN, CHRONIC, EPIGASTRIC: Status: RESOLVED | Noted: 2017-05-12 | Resolved: 2018-07-09

## 2018-07-09 PROBLEM — D48.5 NEOPLASM OF UNCERTAIN BEHAVIOR OF SKIN: Status: RESOLVED | Noted: 2018-01-04 | Resolved: 2018-07-09

## 2018-07-09 PROCEDURE — 99214 OFFICE O/P EST MOD 30 MIN: CPT | Performed by: INTERNAL MEDICINE

## 2018-07-09 RX ORDER — SILDENAFIL 100 MG/1
100 TABLET, FILM COATED ORAL PRN
Qty: 90 TAB | Refills: 4 | Status: SHIPPED | OUTPATIENT
Start: 2018-07-09 | End: 2019-10-04 | Stop reason: SDUPTHER

## 2018-07-09 RX ORDER — LEVOTHYROXINE SODIUM 0.12 MG/1
125 TABLET ORAL
Qty: 90 TAB | Refills: 4 | Status: SHIPPED | OUTPATIENT
Start: 2018-07-09 | End: 2020-12-04 | Stop reason: SDUPTHER

## 2018-07-09 RX ORDER — ATORVASTATIN CALCIUM 10 MG/1
10 TABLET, FILM COATED ORAL DAILY
Qty: 90 TAB | Refills: 4 | Status: SHIPPED | OUTPATIENT
Start: 2018-07-09 | End: 2019-02-04

## 2018-07-09 ASSESSMENT — ENCOUNTER SYMPTOMS
GASTROINTESTINAL NEGATIVE: 1
CONSTITUTIONAL NEGATIVE: 1
RESPIRATORY NEGATIVE: 1
MUSCULOSKELETAL NEGATIVE: 1
PSYCHIATRIC NEGATIVE: 1
EYES NEGATIVE: 1
CARDIOVASCULAR NEGATIVE: 1
NEUROLOGICAL NEGATIVE: 1

## 2018-07-09 NOTE — PROGRESS NOTES
Subjective:      Dusty Eid is a 72 y.o. male who presents with Follow-Up    The patient is here for followup of chronic medical problems listed below. The patient is compliant with medications and having no side effects from them. Denies chest pain, abdominal pain, dyspnea, myalgias, or cough.   Patient Active Problem List    Diagnosis Date Noted   • Other male erectile dysfunction 07/09/2018   • Bronchitis, mucopurulent recurrent (HCC) 01/04/2018   • Chronic neck pain 05/12/2017   • Cervical radicular pain 05/12/2017   • Fatty liver 05/12/2017   • Xiphoid prominence 05/12/2017   • Xiphoidalgia syndrome 05/12/2017   • IFG (impaired fasting glucose) 03/29/2017   • DJD (degenerative joint disease), cervical 11/11/2016   • Chronic left hip pain- mild djd on xray 11/25/2015   • Mixed hyperlipidemia 11/25/2015   • Hypothyroidism due to acquired atrophy of thyroid 12/29/2010     Allergies   Allergen Reactions   • Codeine Nausea     Outpatient Medications Prior to Visit   Medication Sig Dispense Refill   • aspirin EC (ECOTRIN) 81 MG Tablet Delayed Response Take 81 mg by mouth every day.     • amoxicillin (AMOXIL) 875 MG tablet Take 1 Tab by mouth 2 times a day. 30 Tab 0   • levothyroxine (SYNTHROID) 125 MCG Tab TAKE 1 TABLET BY MOUTH EVERY DAY **ALLOW 24- 48HRS FOR REFILLS** 90 Tab 4   • atorvastatin (LIPITOR) 10 MG Tab TAKE 1 TABLET BY MOUTH EVERYDAY 90 Tab 4   • RaNITidine HCl (ZANTAC PO) Take 1 Tab by mouth as needed.     • meloxicam (MOBIC) 15 MG tablet Take 15 mg by mouth every day.       No facility-administered medications prior to visit.              HPI    Review of Systems   Constitutional: Negative.    HENT: Negative.    Eyes: Negative.    Respiratory: Negative.    Cardiovascular: Negative.    Gastrointestinal: Negative.    Genitourinary: Negative.    Musculoskeletal: Negative.    Skin: Negative.    Neurological: Negative.    Endo/Heme/Allergies: Negative.    Psychiatric/Behavioral: Negative.   "         Objective:     /70   Pulse 66   Temp 36.9 °C (98.5 °F)   Ht 1.727 m (5' 7.99\")   Wt 73.9 kg (163 lb)   SpO2 93%   BMI 24.79 kg/m²      Physical Exam   Constitutional: He is oriented to person, place, and time. He appears well-developed and well-nourished. No distress.   HENT:   Head: Normocephalic and atraumatic.   Right Ear: External ear normal.   Left Ear: External ear normal.   Nose: Nose normal.   Mouth/Throat: Oropharynx is clear and moist. No oropharyngeal exudate.   Eyes: Conjunctivae and EOM are normal. Pupils are equal, round, and reactive to light. Right eye exhibits no discharge. Left eye exhibits no discharge. No scleral icterus.   Neck: Normal range of motion. Neck supple. No JVD present. No tracheal deviation present. No thyromegaly present.   Cardiovascular: Normal rate, regular rhythm, normal heart sounds and intact distal pulses.  Exam reveals no gallop and no friction rub.    No murmur heard.  Pulmonary/Chest: Effort normal and breath sounds normal. No stridor. No respiratory distress. He has no wheezes. He has no rales. He exhibits no tenderness.   Abdominal: Soft. Bowel sounds are normal. He exhibits no distension and no mass. There is no tenderness. There is no rebound and no guarding.   Musculoskeletal: Normal range of motion. He exhibits no edema or tenderness.   Lymphadenopathy:     He has no cervical adenopathy.   Neurological: He is alert and oriented to person, place, and time. He has normal reflexes. He displays normal reflexes. He exhibits normal muscle tone. Coordination normal.   Skin: Skin is warm and dry. No rash noted. He is not diaphoretic. No erythema. No pallor.   Psychiatric: He has a normal mood and affect. His behavior is normal. Judgment and thought content normal.          Hospital Outpatient Visit on 07/02/2018   Component Date Value   • Glycohemoglobin 07/02/2018 5.8*   • Est Avg Glucose 07/02/2018 120    • Sodium 07/02/2018 140    • Potassium " 07/02/2018 4.1    • Chloride 07/02/2018 105    • Co2 07/02/2018 28    • Anion Gap 07/02/2018 7.0    • Glucose 07/02/2018 95    • Bun 07/02/2018 16    • Creatinine 07/02/2018 1.13    • Calcium 07/02/2018 9.6    • AST(SGOT) 07/02/2018 25    • ALT(SGPT) 07/02/2018 20    • Alkaline Phosphatase 07/02/2018 35    • Total Bilirubin 07/02/2018 0.7    • Albumin 07/02/2018 4.1    • Total Protein 07/02/2018 7.0    • Globulin 07/02/2018 2.9    • A-G Ratio 07/02/2018 1.4    • Triglycerides 07/02/2018 135    • HDL 07/02/2018 40    • Cholesterol,Tot 07/02/2018 179    • LDL 07/02/2018 112*   • TSH 07/02/2018 0.410    • WBC 07/02/2018 6.2    • RBC 07/02/2018 4.70    • Hemoglobin 07/02/2018 14.9    • Hematocrit 07/02/2018 44.3    • MCV 07/02/2018 94.3    • MCH 07/02/2018 31.7    • MCHC 07/02/2018 33.6*   • RDW 07/02/2018 46.4    • Platelet Count 07/02/2018 269    • MPV 07/02/2018 9.4    • Neutrophils-Polys 07/02/2018 52.80    • Lymphocytes 07/02/2018 29.20    • Monocytes 07/02/2018 11.50    • Eosinophils 07/02/2018 5.20    • Basophils 07/02/2018 1.00    • Immature Granulocytes 07/02/2018 0.30    • Nucleated RBC 07/02/2018 0.00    • Neutrophils (Absolute) 07/02/2018 3.27    • Lymphs (Absolute) 07/02/2018 1.81    • Monos (Absolute) 07/02/2018 0.71    • Eos (Absolute) 07/02/2018 0.32    • Baso (Absolute) 07/02/2018 0.06    • Immature Granulocytes (a* 07/02/2018 0.02    • NRBC (Absolute) 07/02/2018 0.00    • GFR If  07/02/2018 >60    • GFR If Non  Ameri* 07/02/2018 >60       Lab Results   Component Value Date/Time    HBA1C 5.8 (H) 07/02/2018 07:26 AM     Lab Results   Component Value Date/Time    SODIUM 140 07/02/2018 07:26 AM    POTASSIUM 4.1 07/02/2018 07:26 AM    CHLORIDE 105 07/02/2018 07:26 AM    CO2 28 07/02/2018 07:26 AM    GLUCOSE 95 07/02/2018 07:26 AM    BUN 16 07/02/2018 07:26 AM    CREATININE 1.13 07/02/2018 07:26 AM    CREATININE 1.15 12/09/2010 07:25 AM    BUNCREATRAT 16 12/09/2010 07:25 AM    GLOMRATE  >59 12/09/2010 07:25 AM    ALKPHOSPHAT 35 07/02/2018 07:26 AM    ASTSGOT 25 07/02/2018 07:26 AM    ALTSGPT 20 07/02/2018 07:26 AM    TBILIRUBIN 0.7 07/02/2018 07:26 AM     No results found for: INR  Lab Results   Component Value Date/Time    CHOLSTRLTOT 179 07/02/2018 07:26 AM     (H) 07/02/2018 07:26 AM    HDL 40 07/02/2018 07:26 AM    TRIGLYCERIDE 135 07/02/2018 07:26 AM       Lab Results   Component Value Date/Time    TESTOSTERONE 695 06/30/2014 10:29 AM     Lab Results   Component Value Date/Time    TSH 20.800 (H) 03/29/2011 11:33 AM    TSH 20.440 (H) 12/09/2010 07:25 AM     Lab Results   Component Value Date/Time    FREET4 0.97 10/25/2016 09:01 AM    FREET4 0.96 02/24/2016 11:16 AM     No results found for: URICACID  No components found for: VITB12  Lab Results   Component Value Date/Time    25HYDROXY 44 10/25/2016 09:01 AM    25HYDROXY 44.5 12/09/2010 07:25 AM           Assessment/Plan:     1. Mixed hyperlipidemia     - atorvastatin (LIPITOR) 10 MG Tab; Take 1 Tab by mouth every day.  Dispense: 90 Tab; Refill: 4  - COMP METABOLIC PANEL; Future  - LIPID PROFILE; Future  - CBC WITH DIFFERENTIAL; Future    2. Hypothyroidism due to acquired atrophy of thyroid     - levothyroxine (SYNTHROID) 125 MCG Tab; Take 1 Tab by mouth Every morning on an empty stomach.  Dispense: 90 Tab; Refill: 4  - TSH; Future    3. Other male erectile dysfunction       4. IFG (impaired fasting glucose)          - HEMOGLOBIN A1C; Future    30 minute face-to-face encounter took place today.  More than half of this time was spent in the coordination of care of the above problems, as well as counseling.

## 2018-09-20 ENCOUNTER — OFFICE VISIT (OUTPATIENT)
Dept: DERMATOLOGY | Facility: IMAGING CENTER | Age: 73
End: 2018-09-20
Payer: MEDICARE

## 2018-09-20 DIAGNOSIS — L29.9 PRURITUS: ICD-10-CM

## 2018-09-20 DIAGNOSIS — R20.8 SKIN PAIN: ICD-10-CM

## 2018-09-20 DIAGNOSIS — L57.0 ACTINIC KERATOSIS: ICD-10-CM

## 2018-09-20 DIAGNOSIS — L82.0 SEBORRHEIC KERATOSES, INFLAMED: ICD-10-CM

## 2018-09-20 PROCEDURE — 99212 OFFICE O/P EST SF 10 MIN: CPT | Mod: 25 | Performed by: DERMATOLOGY

## 2018-09-20 PROCEDURE — 17110 DESTRUCTION B9 LES UP TO 14: CPT | Performed by: DERMATOLOGY

## 2018-09-20 PROCEDURE — 17003 DESTRUCT PREMALG LES 2-14: CPT | Performed by: DERMATOLOGY

## 2018-09-20 PROCEDURE — 17000 DESTRUCT PREMALG LESION: CPT | Mod: 59 | Performed by: DERMATOLOGY

## 2018-09-20 RX ORDER — FLUOROURACIL 50 MG/G
1 CREAM TOPICAL 2 TIMES DAILY
Qty: 40 G | Refills: 2 | Status: SHIPPED | OUTPATIENT
Start: 2018-09-20 | End: 2020-06-16

## 2018-09-20 ASSESSMENT — ENCOUNTER SYMPTOMS
CHILLS: 0
FEVER: 0

## 2018-09-20 NOTE — PROCEDURES
[facility] - Pre-malignant  Date/Time: 9/20/2018 9:12 AM  Performed by: AROLDO LUIS  Authorized by: AROLDO LUIS       Comment:   CRYOTHERAPY:  Risks (including, but not limited to: hypo or hyperpigmentation, redness, blister, blood blister, recurrence, need for further treatment, infection, scar) and benefits of cryotherapy discussed. Patient verbally agreed to proceed with treatment. 2 cryotherapy freeze thaw cycles of 10 seconds were applied to 8 lesions on the face, frontal scalp with cryac. Patient tolerated procedure well. Aftercare instructions given.  [facility] Lopez[facility] - Benign  Date/Time: 9/20/2018 9:12 AM  Performed by: AROLDO LUIS  Authorized by: AROLDO LUIS       Comments:  CRYOTHERAPY:  Risks (including, but not limited to: hypo or hyperpigmentation, redness, blister, blood blister, recurrence, need for further treatment, infection, scar) and benefits of cryotherapy discussed. Patient verbally agreed to proceed with treatment. 3 cryotherapy freeze thaw cycles of 10 seconds were applied to 7 lesions on the upper chest/around the neck with cryac. Patient tolerated procedure well. Aftercare instructions given.

## 2018-09-20 NOTE — PROGRESS NOTES
Dermatology Return Patient Visit    Chief Complaint   Patient presents with   • Actinic Keratosis       Subjective:     HPI:   Dusty Eid is a 72 y.o. male presenting for    Follow AK's.  Pt states most of them have went away, still with some on the frontal scalp    Brown spots around the neck  Present for 5-10 years  Has acquired several more over the years  Several are very itchy, have caused pain w/ trauma  No treatments    History of skin cancer: No  History of biopsies:yes, back, benign  History of blistering/severe sunburns:No  Family history of skin cancer:No  Family history of atypical moles:Yes, Details: chest area          Past Medical History:   Diagnosis Date   • Cold 3/2017    has not resolved/cxr ordered   • Erectile dysfunction    • Heart burn     L arm   • Hyperlipidemia    • Indigestion    • Infectious disease 3/2017    flu   • Renal disorder     kidney stones   • Thyroid disease        Current Outpatient Prescriptions on File Prior to Visit   Medication Sig Dispense Refill   • atorvastatin (LIPITOR) 10 MG Tab Take 1 Tab by mouth every day. 90 Tab 4   • levothyroxine (SYNTHROID) 125 MCG Tab Take 1 Tab by mouth Every morning on an empty stomach. 90 Tab 4   • sildenafil citrate (VIAGRA) 100 MG tablet Take 1 Tab by mouth as needed for Erectile Dysfunction. 90 Tab 4   • aspirin EC (ECOTRIN) 81 MG Tablet Delayed Response Take 81 mg by mouth every day.       No current facility-administered medications on file prior to visit.        Allergies   Allergen Reactions   • Codeine Nausea       Family History   Problem Relation Age of Onset   • Cancer Father        Social History     Social History   • Marital status:      Spouse name: N/A   • Number of children: N/A   • Years of education: N/A     Occupational History   • Not on file.     Social History Main Topics   • Smoking status: Never Smoker   • Smokeless tobacco: Never Used   • Alcohol use 0.0 oz/week      Comment: Occasional wine    •  Drug use: No   • Sexual activity: Not on file     Other Topics Concern   • Not on file     Social History Narrative   • No narrative on file       Review of Systems   Constitutional: Negative for chills and fever.   Skin: Positive for itching. Negative for rash.   All other systems reviewed and are negative.       Objective:     A full mucocutaneous exam was completed including: scalp, hair, ears, face, eyelids, conjunctiva, lips, gums/tongue/oropharynx, neck, chest, abdomen, back, left and right upper extremities (including hands/digits and fingernails), left and right lower extremities (including feet/toes, toenails), buttocks, excluding external genitalia (patient refusal) with the following pertinent findings listed below. Remaining above-listed examined areas within normal limits / negative for rashes or lesions.    There were no vitals taken for this visit.    Physical Exam   Constitutional: He is oriented to person, place, and time and well-developed, well-nourished, and in no distress.   HENT:   Head: Normocephalic and atraumatic.       Right Ear: External ear normal.   Left Ear: External ear normal.   Eyes: Conjunctivae and lids are normal.   Neck: Normal range of motion.   Pulmonary/Chest: Effort normal.   Neurological: He is alert and oriented to person, place, and time.   Skin: Skin is warm and dry.        Psychiatric: Mood and affect normal.       DATA: none applicable to review    Assessment and Plan:     1. Actinic keratosis  - see procedure note  - fluorouracil (EFUDEX) 5 % cream; Apply 1 Application to affected area(s) 2 times a day. Two weeks, avoid eyes  Dispense: 40 g; Refill: 2  AVOID eye area. Side effects of cream (significant irritation, erythema, scaling, itching, weeping, crusting, pain) discussed.   - Patient instructed to protect the skin from the sun.  - Aquaphor for itching/small open sores  - he was instructed to call me at any point if he has any concerns about proceeding with  treatment    2. Seborrheic keratoses, inflamed, +skin pain/pruritus  - see procedure note    Followup: No Follow-up on file.    Trinidad Buck M.D.

## 2018-11-12 ENCOUNTER — OFFICE VISIT (OUTPATIENT)
Dept: DERMATOLOGY | Facility: IMAGING CENTER | Age: 73
End: 2018-11-12
Payer: MEDICARE

## 2018-11-12 DIAGNOSIS — L57.0 ACTINIC KERATOSES: ICD-10-CM

## 2018-11-12 PROCEDURE — 99212 OFFICE O/P EST SF 10 MIN: CPT | Performed by: DERMATOLOGY

## 2018-11-12 ASSESSMENT — ENCOUNTER SYMPTOMS
FEVER: 0
CHILLS: 0

## 2018-11-12 NOTE — PROGRESS NOTES
Dermatology Return Patient Visit    Chief Complaint   Patient presents with   • Actinic Keratosis       Subjective:     HPI:   Dusty Eid is a 72 y.o. male presenting for    Follow up, actinic keratoses, scalp  Has been using efudex x 2 weeks - right frontal scalp only  No pain, itching  Tolerating well    History of skin cancer: No  History of biopsies:yes, back, benign  History of blistering/severe sunburns:No  Family history of skin cancer:No  Family history of atypical moles:Yes, Details: chest area          Past Medical History:   Diagnosis Date   • Cold 3/2017    has not resolved/cxr ordered   • Erectile dysfunction    • Heart burn     L arm   • Hyperlipidemia    • Indigestion    • Infectious disease 3/2017    flu   • Renal disorder     kidney stones   • Thyroid disease        Current Outpatient Prescriptions on File Prior to Visit   Medication Sig Dispense Refill   • fluorouracil (EFUDEX) 5 % cream Apply 1 Application to affected area(s) 2 times a day. Two weeks, avoid eyes 40 g 2   • atorvastatin (LIPITOR) 10 MG Tab Take 1 Tab by mouth every day. 90 Tab 4   • levothyroxine (SYNTHROID) 125 MCG Tab Take 1 Tab by mouth Every morning on an empty stomach. 90 Tab 4   • sildenafil citrate (VIAGRA) 100 MG tablet Take 1 Tab by mouth as needed for Erectile Dysfunction. 90 Tab 4   • aspirin EC (ECOTRIN) 81 MG Tablet Delayed Response Take 81 mg by mouth every day.       No current facility-administered medications on file prior to visit.        Allergies   Allergen Reactions   • Codeine Nausea       Family History   Problem Relation Age of Onset   • Cancer Father        Social History     Social History   • Marital status:      Spouse name: N/A   • Number of children: N/A   • Years of education: N/A     Occupational History   • Not on file.     Social History Main Topics   • Smoking status: Never Smoker   • Smokeless tobacco: Never Used   • Alcohol use 0.0 oz/week      Comment: Occasional wine    •  Drug use: No   • Sexual activity: Not on file     Other Topics Concern   • Not on file     Social History Narrative   • No narrative on file       Review of Systems   Constitutional: Negative for chills and fever.   Skin: Negative for itching and rash.   All other systems reviewed and are negative.       Objective:     A focused cutaneous exam was completed including: scalp, hair, ears, face, eyelids, conjunctiva, lips, neck with the following pertinent findings listed below. Remaining above-listed examined areas within normal limits / negative for rashes or lesions.    There were no vitals taken for this visit.    Physical Exam   Constitutional: He is oriented to person, place, and time and well-developed, well-nourished, and in no distress.   HENT:   Head: Normocephalic and atraumatic.       Right Ear: External ear normal.   Left Ear: External ear normal.   Eyes: Conjunctivae and lids are normal.   Neck: Normal range of motion.   Pulmonary/Chest: Effort normal.   Neurological: He is alert and oriented to person, place, and time.   Skin: Skin is warm and dry.        Psychiatric: Mood and affect normal.       DATA: none applicable to review    Assessment and Plan:     1. Actinic keratosis  - continue fluorouracil (EFUDEX) 5 % cream; Apply 1 Application to affected area(s) 2 times a day x 2 more weeks, avoid eyes  Dispense: 40 g; Refill: 2  AVOID eye area. Side effects of cream (significant irritation, erythema, scaling, itching, weeping, crusting, pain) discussed.   - extend to remainder of the scalp as well  - Patient instructed to protect the skin from the sun.  - Aquaphor for itching/small open sores  - he was instructed to call me at any point if he has any concerns about proceeding with treatment    Followup: Return in about 3 months (around 2/12/2019).    Trinidad Buck M.D.

## 2019-01-28 ENCOUNTER — HOSPITAL ENCOUNTER (OUTPATIENT)
Dept: LAB | Facility: MEDICAL CENTER | Age: 74
End: 2019-01-28
Attending: INTERNAL MEDICINE
Payer: MEDICARE

## 2019-01-28 DIAGNOSIS — E78.2 MIXED HYPERLIPIDEMIA: ICD-10-CM

## 2019-01-28 DIAGNOSIS — E03.4 HYPOTHYROIDISM DUE TO ACQUIRED ATROPHY OF THYROID: ICD-10-CM

## 2019-01-28 DIAGNOSIS — R73.01 IFG (IMPAIRED FASTING GLUCOSE): ICD-10-CM

## 2019-01-28 LAB
ALBUMIN SERPL BCP-MCNC: 4.2 G/DL (ref 3.2–4.9)
ALBUMIN/GLOB SERPL: 1.2 G/DL
ALP SERPL-CCNC: 58 U/L (ref 30–99)
ALT SERPL-CCNC: 41 U/L (ref 2–50)
ANION GAP SERPL CALC-SCNC: 7 MMOL/L (ref 0–11.9)
AST SERPL-CCNC: 44 U/L (ref 12–45)
BASOPHILS # BLD AUTO: 0.8 % (ref 0–1.8)
BASOPHILS # BLD: 0.05 K/UL (ref 0–0.12)
BILIRUB SERPL-MCNC: 0.7 MG/DL (ref 0.1–1.5)
BUN SERPL-MCNC: 17 MG/DL (ref 8–22)
CALCIUM SERPL-MCNC: 9.6 MG/DL (ref 8.5–10.5)
CHLORIDE SERPL-SCNC: 102 MMOL/L (ref 96–112)
CHOLEST SERPL-MCNC: 151 MG/DL (ref 100–199)
CO2 SERPL-SCNC: 29 MMOL/L (ref 20–33)
CREAT SERPL-MCNC: 1.09 MG/DL (ref 0.5–1.4)
EOSINOPHIL # BLD AUTO: 0.26 K/UL (ref 0–0.51)
EOSINOPHIL NFR BLD: 4 % (ref 0–6.9)
ERYTHROCYTE [DISTWIDTH] IN BLOOD BY AUTOMATED COUNT: 45.6 FL (ref 35.9–50)
EST. AVERAGE GLUCOSE BLD GHB EST-MCNC: 123 MG/DL
FASTING STATUS PATIENT QL REPORTED: NORMAL
GLOBULIN SER CALC-MCNC: 3.4 G/DL (ref 1.9–3.5)
GLUCOSE SERPL-MCNC: 89 MG/DL (ref 65–99)
HBA1C MFR BLD: 5.9 % (ref 0–5.6)
HCT VFR BLD AUTO: 46.9 % (ref 42–52)
HDLC SERPL-MCNC: 31 MG/DL
HGB BLD-MCNC: 15.5 G/DL (ref 14–18)
IMM GRANULOCYTES # BLD AUTO: 0.03 K/UL (ref 0–0.11)
IMM GRANULOCYTES NFR BLD AUTO: 0.5 % (ref 0–0.9)
LDLC SERPL CALC-MCNC: 94 MG/DL
LYMPHOCYTES # BLD AUTO: 1.79 K/UL (ref 1–4.8)
LYMPHOCYTES NFR BLD: 27.3 % (ref 22–41)
MCH RBC QN AUTO: 31.6 PG (ref 27–33)
MCHC RBC AUTO-ENTMCNC: 33 G/DL (ref 33.7–35.3)
MCV RBC AUTO: 95.7 FL (ref 81.4–97.8)
MONOCYTES # BLD AUTO: 0.69 K/UL (ref 0–0.85)
MONOCYTES NFR BLD AUTO: 10.5 % (ref 0–13.4)
NEUTROPHILS # BLD AUTO: 3.73 K/UL (ref 1.82–7.42)
NEUTROPHILS NFR BLD: 56.9 % (ref 44–72)
NRBC # BLD AUTO: 0 K/UL
NRBC BLD-RTO: 0 /100 WBC
PLATELET # BLD AUTO: 295 K/UL (ref 164–446)
PMV BLD AUTO: 9.2 FL (ref 9–12.9)
POTASSIUM SERPL-SCNC: 4.2 MMOL/L (ref 3.6–5.5)
PROT SERPL-MCNC: 7.6 G/DL (ref 6–8.2)
RBC # BLD AUTO: 4.9 M/UL (ref 4.7–6.1)
SODIUM SERPL-SCNC: 138 MMOL/L (ref 135–145)
TRIGL SERPL-MCNC: 132 MG/DL (ref 0–149)
TSH SERPL DL<=0.005 MIU/L-ACNC: 6.13 UIU/ML (ref 0.38–5.33)
WBC # BLD AUTO: 6.6 K/UL (ref 4.8–10.8)

## 2019-01-28 PROCEDURE — 80053 COMPREHEN METABOLIC PANEL: CPT

## 2019-01-28 PROCEDURE — 80061 LIPID PANEL: CPT

## 2019-01-28 PROCEDURE — 36415 COLL VENOUS BLD VENIPUNCTURE: CPT

## 2019-01-28 PROCEDURE — 83036 HEMOGLOBIN GLYCOSYLATED A1C: CPT | Mod: GA

## 2019-01-28 PROCEDURE — 85025 COMPLETE CBC W/AUTO DIFF WBC: CPT

## 2019-01-28 PROCEDURE — 84443 ASSAY THYROID STIM HORMONE: CPT

## 2019-02-04 ENCOUNTER — OFFICE VISIT (OUTPATIENT)
Dept: MEDICAL GROUP | Age: 74
End: 2019-02-04
Payer: MEDICARE

## 2019-02-04 VITALS
WEIGHT: 164 LBS | TEMPERATURE: 98.1 F | BODY MASS INDEX: 24.86 KG/M2 | SYSTOLIC BLOOD PRESSURE: 112 MMHG | HEART RATE: 74 BPM | DIASTOLIC BLOOD PRESSURE: 68 MMHG | OXYGEN SATURATION: 95 % | HEIGHT: 68 IN

## 2019-02-04 DIAGNOSIS — E78.2 MIXED HYPERLIPIDEMIA: ICD-10-CM

## 2019-02-04 DIAGNOSIS — N52.8 OTHER MALE ERECTILE DYSFUNCTION: ICD-10-CM

## 2019-02-04 DIAGNOSIS — Z00.00 MEDICARE ANNUAL WELLNESS VISIT, SUBSEQUENT: ICD-10-CM

## 2019-02-04 DIAGNOSIS — Z12.11 SCREENING FOR COLON CANCER: ICD-10-CM

## 2019-02-04 DIAGNOSIS — J20.9 ACUTE BRONCHITIS, UNSPECIFIED ORGANISM: ICD-10-CM

## 2019-02-04 DIAGNOSIS — N50.89 SCROTAL MASS: ICD-10-CM

## 2019-02-04 DIAGNOSIS — Z12.5 SCREENING FOR PROSTATE CANCER: ICD-10-CM

## 2019-02-04 DIAGNOSIS — R73.01 IFG (IMPAIRED FASTING GLUCOSE): ICD-10-CM

## 2019-02-04 DIAGNOSIS — Z23 NEED FOR INFLUENZA VACCINATION: ICD-10-CM

## 2019-02-04 DIAGNOSIS — E03.4 HYPOTHYROIDISM DUE TO ACQUIRED ATROPHY OF THYROID: ICD-10-CM

## 2019-02-04 DIAGNOSIS — R30.0 DYSURIA: ICD-10-CM

## 2019-02-04 PROBLEM — R07.89: Status: RESOLVED | Noted: 2017-05-12 | Resolved: 2019-02-04

## 2019-02-04 PROBLEM — R29.898 XIPHOID PROMINENCE: Status: RESOLVED | Noted: 2017-05-12 | Resolved: 2019-02-04

## 2019-02-04 PROBLEM — G89.29 CHRONIC NECK PAIN: Status: RESOLVED | Noted: 2017-05-12 | Resolved: 2019-02-04

## 2019-02-04 PROBLEM — M54.12 CERVICAL RADICULAR PAIN: Status: RESOLVED | Noted: 2017-05-12 | Resolved: 2019-02-04

## 2019-02-04 PROBLEM — M54.2 CHRONIC NECK PAIN: Status: RESOLVED | Noted: 2017-05-12 | Resolved: 2019-02-04

## 2019-02-04 PROCEDURE — 90662 IIV NO PRSV INCREASED AG IM: CPT | Performed by: INTERNAL MEDICINE

## 2019-02-04 PROCEDURE — G0439 PPPS, SUBSEQ VISIT: HCPCS | Performed by: INTERNAL MEDICINE

## 2019-02-04 PROCEDURE — G0008 ADMIN INFLUENZA VIRUS VAC: HCPCS | Performed by: INTERNAL MEDICINE

## 2019-02-04 RX ORDER — ATORVASTATIN CALCIUM 20 MG/1
20 TABLET, FILM COATED ORAL DAILY
Qty: 90 TAB | Refills: 4 | Status: SHIPPED | OUTPATIENT
Start: 2019-02-04 | End: 2020-03-09

## 2019-02-04 RX ORDER — AMOXICILLIN 875 MG/1
875 TABLET, COATED ORAL 2 TIMES DAILY
Qty: 30 TAB | Refills: 0 | Status: SHIPPED | OUTPATIENT
Start: 2019-02-04 | End: 2020-01-07 | Stop reason: SDUPTHER

## 2019-02-04 ASSESSMENT — ENCOUNTER SYMPTOMS
HEMOPTYSIS: 0
GENERAL WELL-BEING: EXCELLENT
SPUTUM PRODUCTION: 1
MUSCULOSKELETAL NEGATIVE: 1
WHEEZING: 0
CARDIOVASCULAR NEGATIVE: 1
WEAKNESS: 1
PSYCHIATRIC NEGATIVE: 1
COUGH: 1
GASTROINTESTINAL NEGATIVE: 1
SHORTNESS OF BREATH: 1

## 2019-02-04 ASSESSMENT — ACTIVITIES OF DAILY LIVING (ADL): BATHING_REQUIRES_ASSISTANCE: 0

## 2019-02-04 ASSESSMENT — PATIENT HEALTH QUESTIONNAIRE - PHQ9: CLINICAL INTERPRETATION OF PHQ2 SCORE: 0

## 2019-02-04 NOTE — PROGRESS NOTES
Chief Complaint   Patient presents with   • Annual Wellness Visit              HPI:  Dusty is a 73 y.o. here for Medicare Annual Wellness Visit    The patient is here for follow up of labs completed on 01/28/19 and for follow up of chronic medical problems listed below. He has a history of hypothyroidism and states he missed a few doses of Levothyroxine the last few days. He has a history of hyperlipidemia which is well controlled with Atorvastatin. Chronic neck pain resolved with anti-inflammatory medication. No prior history of CVA or MI. No significant family history of cardiac disease.     Overall, the patient is doing well but states he did develop cold symptoms a couple weeks ago and is completing a course of antibiotics at this time. His symptoms are gradually improving including his productive cough. He complains of intermittent shortness of breath for the past four days as well as malaise and generalized weakness. He was able to shovel snow from his driveway this morning with no acute shortness of breath or chest pain. In addition, he would like an urinalysis to be completed as he has experienced occasional dysuria recently. No other urinary symptoms including urinary urgency, frequency or hematuria.     He plans to travel to [place] in 04/2019 and is requesting antibiotics in anticipation for possible traveler's diarrhea. He is due for an influenza vaccine. He is due for a colonoscopy. His grandmother has a history of colon cancer.       Review of Systems   Constitutional: Positive for malaise/fatigue.   HENT: Negative.    Respiratory: Positive for cough (productive), sputum production and shortness of breath. Negative for hemoptysis and wheezing.    Cardiovascular: Negative.    Gastrointestinal: Negative.    Genitourinary: Positive for dysuria. Negative for frequency, hematuria and urgency.   Musculoskeletal: Negative.    Skin: Negative.    Neurological: Positive for weakness (generalized).    Endo/Heme/Allergies: Negative.    Psychiatric/Behavioral: Negative.    All other systems reviewed and are negative.     See HPI for further details.      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 30 Tab 0   • atorvastatin (LIPITOR) 20 MG Tab Take 1 Tab by mouth every day. 90 Tab 4   • fluorouracil (EFUDEX) 5 % cream Apply 1 Application to affected area(s) 2 times a day. Two weeks, avoid eyes 40 g 2   • levothyroxine (SYNTHROID) 125 MCG Tab Take 1 Tab by mouth Every morning on an empty stomach. 90 Tab 4   • aspirin EC (ECOTRIN) 81 MG Tablet Delayed Response Take 81 mg by mouth every day.     • sildenafil citrate (VIAGRA) 100 MG tablet Take 1 Tab by mouth as needed for Erectile Dysfunction. 90 Tab 4     No current facility-administered medications for this visit.         Patient is taking medications as noted in medication list.  Current supplements as per medication list.     Allergies: Codeine    Current social contact/activities: dance/car shows/go out      Is patient current with immunizations? No, due for FLU, TDAP and SHINGRIX (Shingles). Patient is interested in receiving NONE today.    He  reports that he has never smoked. He has never used smokeless tobacco. He reports that he drinks alcohol. He reports that he does not use drugs.  Counseling given: Not Answered        DPA/Advanced directive: Patient has Advanced Directive, but it is not on file.  Instructed to bring in a copy to scan into their chart.    ROS:    Gait: Uses no assistive device    Ostomy: No    Other tubes: No    Amputations: No     Chronic oxygen use no    Last eye exam 2017    Wears hearing aids: No    : Denies any urinary leakage during the last 6 months       Screening:    Depression Screening    Little interest or pleasure in doing things?  0 - not at all  Feeling down, depressed, or hopeless? 0 - not at all  Patient Health Questionnaire Score: 0    If depressive symptoms identified deferred to follow up visit unless specifically addressed in assessment and plan.    Interpretation of PHQ-9 Total Score   Score Severity   1-4 No Depression   5-9 Mild Depression   10-14 Moderate Depression   15-19 Moderately Severe Depression   20-27 Severe Depression    Screening for Cognitive Impairment    Three Minute Recall (leader, season, table)  2/3    David clock face with all 12 numbers and set the hands to show 10 past 11.  Yes    If cognitive concerns identified, deferred for follow up unless specifically addressed in assessment and plan.    Fall Risk Assessment    Has the patient had two or more falls in the last year or any fall with injury in the last year?  No  If fall risk identified, deferred for follow up unless specifically addressed in assessment and plan.    Safety Assessment    Throw rugs on floor.  Yes  Handrails on all stairs.  Yes  Good lighting in all hallways.  Yes  Difficulty hearing.  No  Patient counseled about all safety risks that were identified.    Functional Assessment ADLs    Are there any barriers preventing you from cooking for yourself or meeting nutritional needs?  No.    Are there any barriers preventing you from driving safely or obtaining transportation?  No.    Are there any barriers preventing you from using a telephone or calling for help?  No.    Are there any barriers preventing you from shopping?  No.    Are there any barriers preventing you from taking care  "of your own finances?  No.    Are there any barriers preventing you from managing your medications?  No.    Are there any barriers preventing you from showering, bathing or dressing yourself?  No.    Are you currently engaging in any exercise or physical activity?  Yes.  Walk/shovel snow  What is your perception of your health?  Excellent.    Health Maintenance Summary                IMM DTaP/Tdap/Td Vaccine Overdue 11/14/1964     IMM ZOSTER VACCINES Overdue 11/14/1995     COLONOSCOPY Next Due 2/12/2019      Done 2/12/2014 AMB REFERRAL TO GI FOR COLONOSCOPY    Annual Wellness Visit Next Due 2/5/2020      Done 2/4/2019 Visit Dx: Medicare annual wellness visit, subsequent     Patient has more history with this topic...          Patient Care Team:  Marcial Mcfarlane M.D. as PCP - General (Internal Medicine)    Social History   Substance Use Topics   • Smoking status: Never Smoker   • Smokeless tobacco: Never Used   • Alcohol use 0.0 oz/week      Comment: Occasional wine      Family History   Problem Relation Age of Onset   • Cancer Father      He  has a past medical history of Cold (3/2017); Erectile dysfunction; Heart burn; Hyperlipidemia; Indigestion; Infectious disease (3/2017); Renal disorder; and Thyroid disease.   Past Surgical History:   Procedure Laterality Date   • LIPOMA EXCISION N/A 4/19/2017    Procedure: LIPOMA EXCISION - 5CM  FROM MIDBACK/NECK;  Surgeon: David Rivas M.D.;  Location: SURGERY SAME DAY [facility];  Service:    • EYE SURGERY      as a child   • HERNIA REPAIR Right        Exam:   Blood pressure 112/68, pulse 74, temperature 36.7 °C (98.1 °F), height 1.727 m (5' 8\"), weight 74.4 kg (164 lb), SpO2 95 %. Body mass index is 24.94 kg/m².    Hearing excellent.    Dentition good  Alert, oriented in no acute distress.  Eye contact is good, speech goal directed, affect calm    Physical Exam   Constitutional: Oriented to person, place, and time. Appears well-developed and well-nourished. No " distress.   Head: Normocephalic and atraumatic.   Right Ear: External ear normal.   Left Ear: External ear normal.   Nose: Nose normal.   Mouth/Throat: Oropharynx is clear and moist. No oropharyngeal exudate.   Eyes: Pupils are equal, round, and reactive to light. Conjunctivae and EOM are normal. Right eye exhibits no discharge. Left eye exhibits no discharge. No scleral icterus.   Neck: Normal range of motion. Neck supple. No JVD present. No tracheal deviation present. No thyromegaly present. No bruit.  Cardiovascular: Normal rate, regular rhythm, normal heart sounds and intact distal pulses.  Exam reveals no gallop and no friction rub.    No murmur heard.  Pulmonary/Chest: Effort normal. No stridor. No respiratory distress. No wheezing or rales. No tenderness.   Abdominal: Soft. Bowel sounds are normal. No distension and no mass. There is no tenderness. There is no rebound and no guarding. No hernia. No pulsatile masses.  Musculoskeletal: Normal range of motion No edema or tenderness.   Lymphadenopathy: No cervical adenopathy.   Neurological: Alert and oriented to person, place, and time. Normal reflexes. Normal reflexes. No cranial nerve deficit. Normal muscle tone. Coordination normal.   Skin: Skin is warm and dry. No rash noted. Not diaphoretic. No erythema. No pallor.   Psychiatric: Normal mood and affect. Behavior is normal. Judgment and thought content normal.       Assessment and Plan. The following treatment and monitoring plan is recommended:     1. Medicare annual wellness visit, subsequent     2. Acute bronchitis, unspecified organism  amoxicillin (AMOXIL) 875 MG tablet   3. Hypothyroidism due to acquired atrophy of thyroid  TSH   4. Screening for colon cancer  REFERRAL TO GI FOR COLONOSCOPY   5. Mixed hyperlipidemia  COMP METABOLIC PANEL    Lipid Profile    CBC WITH DIFFERENTIAL    CRP HIGH SENSITIVE (CARDIAC)   6. IFG (impaired fasting glucose)  HEMOGLOBIN A1C   7. Other male erectile dysfunction      8. Screening for prostate cancer  PROSTATE SPECIFIC AG DIAGNOSTIC    PROSTATE SPECIFIC AG DIAGNOSTIC   9. Need for influenza vaccination  Influenza Vaccine, High Dose (65+ Only)   1. Medicare annual wellness visit, subsequent     - Subsequent Annual Wellness Visit - Includes PPPS ()    2. Acute bronchitis, unspecified organism     - amoxicillin (AMOXIL) 875 MG tablet; Take 1 Tab by mouth 2 times a day.  Dispense: 30 Tab; Refill: 0  - Subsequent Annual Wellness Visit - Includes PPPS ()    3. Hypothyroidism due to acquired atrophy of thyroid     Needs kolton- tsh lsightly elev=6; if still up in 3 mos will increase dose  - TSH; Future  - Subsequent Annual Wellness Visit - Includes PPPS ()    4. Screening for colon cancer     - REFERRAL TO GI FOR COLONOSCOPY  - Subsequent Annual Wellness Visit - Includes PPPS ()    5. Mixed hyperlipidemia  Not at goal- increase lipitor to 20 mg a day.    - COMP METABOLIC PANEL; Future  - Lipid Profile; Future  - CBC WITH DIFFERENTIAL; Future  - CRP HIGH SENSITIVE (CARDIAC); Future  - Subsequent Annual Wellness Visit - Includes PPPS ()    6. IFG (impaired fasting glucose)    Under good control. Continue same regimen.    - HEMOGLOBIN A1C; Future  - Subsequent Annual Wellness Visit - Includes PPPS ()    7. Other male erectile dysfunction   Under good control. Continue same regimen.    - Subsequent Annual Wellness Visit - Includes PPPS ()    8. Screening for prostate cancer       - PROSTATE SPECIFIC AG DIAGNOSTIC; Future  - Subsequent Annual Wellness Visit - Includes PPPS ()    9. Need for influenza vaccination     - Influenza Vaccine, High Dose (65+ Only)  - Subsequent Annual Wellness Visit - Includes PPPS ()      Services suggested: No services needed at this time  Health Care Screening recommendations as per orders if indicated.  Referrals offered: PT/OT/Nutrition counseling/Behavioral Health/Smoking cessation as per orders if  indicated.    Discussion today about general wellness and lifestyle habits:    · Prevent falls and reduce trip hazards; Cautioned about securing or removing rugs.  · Have a working fire alarm and carbon monoxide detector;   · Engage in regular physical activity and social activities       Follow-up in three months with laboratory testing as noted above.

## 2019-02-13 ENCOUNTER — OFFICE VISIT (OUTPATIENT)
Dept: DERMATOLOGY | Facility: IMAGING CENTER | Age: 74
End: 2019-02-13
Payer: MEDICARE

## 2019-02-13 DIAGNOSIS — L29.9 PRURITUS: ICD-10-CM

## 2019-02-13 DIAGNOSIS — L82.1 SEBORRHEIC KERATOSIS: ICD-10-CM

## 2019-02-13 DIAGNOSIS — L82.0 SEBORRHEIC KERATOSES, INFLAMED: ICD-10-CM

## 2019-02-13 DIAGNOSIS — L57.0 ACTINIC KERATOSES: ICD-10-CM

## 2019-02-13 PROCEDURE — 17000 DESTRUCT PREMALG LESION: CPT | Mod: 59 | Performed by: DERMATOLOGY

## 2019-02-13 PROCEDURE — 17003 DESTRUCT PREMALG LES 2-14: CPT | Performed by: DERMATOLOGY

## 2019-02-13 PROCEDURE — 17110 DESTRUCTION B9 LES UP TO 14: CPT | Performed by: DERMATOLOGY

## 2019-02-13 ASSESSMENT — ENCOUNTER SYMPTOMS
CHILLS: 0
FEVER: 0

## 2019-02-13 NOTE — PROGRESS NOTES
Dermatology Return Patient Visit    Chief Complaint   Patient presents with   • Actinic Keratosis       Subjective:     HPI:   Dusty Eid is a 72 y.o. male presenting for    Follow up Aks   Completed efudex tx in Dec 2018 - scalp  Notes very few residual rough lesions on the right side of the head    HPI: skin lesion   Location: back   Time present: unknown   Painful lesion: No  Itching lesion: yes - can be very  Enlarging lesion: unsure  Anything make it better or worse? Clothes irritate it    HPI: skin lesion   Location: back of right leg   Time present: few years   Painful lesion: No  Itching lesion: No  Enlarging lesion: No  Anything make it better or worse? n/a    History of skin cancer: No  History of biopsies:yes, back, benign  History of blistering/severe sunburns:No  Family history of skin cancer:No  Family history of atypical moles:Yes, Details: chest area            Past Medical History:   Diagnosis Date   • Cold 3/2017    has not resolved/cxr ordered   • Erectile dysfunction    • Heart burn     L arm   • Hyperlipidemia    • Indigestion    • Infectious disease 3/2017    flu   • Renal disorder     kidney stones   • Thyroid disease        Current Outpatient Prescriptions on File Prior to Visit   Medication Sig Dispense Refill   • amoxicillin (AMOXIL) 875 MG tablet Take 1 Tab by mouth 2 times a day. 30 Tab 0   • atorvastatin (LIPITOR) 20 MG Tab Take 1 Tab by mouth every day. 90 Tab 4   • fluorouracil (EFUDEX) 5 % cream Apply 1 Application to affected area(s) 2 times a day. Two weeks, avoid eyes 40 g 2   • levothyroxine (SYNTHROID) 125 MCG Tab Take 1 Tab by mouth Every morning on an empty stomach. 90 Tab 4   • sildenafil citrate (VIAGRA) 100 MG tablet Take 1 Tab by mouth as needed for Erectile Dysfunction. 90 Tab 4   • aspirin EC (ECOTRIN) 81 MG Tablet Delayed Response Take 81 mg by mouth every day.       No current facility-administered medications on file prior to visit.        Allergies    Allergen Reactions   • Codeine Nausea       Family History   Problem Relation Age of Onset   • Cancer Father        Social History     Social History   • Marital status:      Spouse name: N/A   • Number of children: N/A   • Years of education: N/A     Occupational History   • Not on file.     Social History Main Topics   • Smoking status: Never Smoker   • Smokeless tobacco: Never Used   • Alcohol use 0.0 oz/week      Comment: Occasional wine    • Drug use: No   • Sexual activity: Not Currently     Partners: Female     Other Topics Concern   • Not on file     Social History Narrative   • No narrative on file       Review of Systems   Constitutional: Negative for chills and fever.   Skin: Negative for itching and rash.   All other systems reviewed and are negative.       Objective:     A focused cutaneous exam was completed including: scalp, hair, ears, face, eyelids, conjunctiva, lips, neck with the following pertinent findings listed below. Remaining above-listed examined areas within normal limits / negative for rashes or lesions.    There were no vitals taken for this visit.    Physical Exam   Constitutional: He is oriented to person, place, and time and well-developed, well-nourished, and in no distress.   HENT:   Head: Normocephalic and atraumatic.       Right Ear: External ear normal.   Left Ear: External ear normal.   Eyes: Conjunctivae and lids are normal.   Neck: Normal range of motion.   Pulmonary/Chest: Effort normal.   Neurological: He is alert and oriented to person, place, and time.   Skin: Skin is warm and dry.        Psychiatric: Mood and affect normal.       DATA: none applicable to review    Assessment and Plan:     1. Actinic keratosis  - s/p 4 weeks efudex on the scalp, few residual lesions  CRYOTHERAPY:  Risks (including, but not limited to: hypo or hyperpigmentation, redness, blister, blood blister, recurrence, need for further treatment, infection, scar) and benefits of cryotherapy  discussed. Patient verbally agreed to proceed with treatment. 2 cryotherapy freeze thaw cycles of 10 seconds were applied to 6 lesions on the scalp with cryac. Patient tolerated procedure well. Aftercare instructions given.  - can use efudex for 2 weeks x any active areas as needed -- patient will be traveling a lot over the next year, in case cannot get into office     2. Seborrheic keratoses, inflamed, +Pruritus  CRYOTHERAPY:  Risks (including, but not limited to: hypo or hyperpigmentation, redness, blister, blood blister, recurrence, need for further treatment, infection, scar) and benefits of cryotherapy discussed. Patient verbally agreed to proceed with treatment. 3 cryotherapy freeze thaw cycles of 10 seconds were applied to 3 lesions on the back with cryac. Patient tolerated procedure well. Aftercare instructions given.    3. Seborrheic keratosis - right posterior leg  - Benign-appearing nature of lesion discussed. Advised to return to clinic for any new or concerning changes.    Followup: Return in about 1 year (around 2/13/2020), or if symptoms worsen or fail to improve.    I EXAMINED / EVALUATED PATIENT AS NOTED ABOVE, DOCUMENTATION COMPLETED BY MYSELF ON DATE OF APPOINTMENT. MARYLOU Buck M.D.

## 2019-06-06 ENCOUNTER — HOSPITAL ENCOUNTER (OUTPATIENT)
Dept: LAB | Facility: MEDICAL CENTER | Age: 74
End: 2019-06-06
Attending: INTERNAL MEDICINE
Payer: MEDICARE

## 2019-06-06 DIAGNOSIS — E03.4 HYPOTHYROIDISM DUE TO ACQUIRED ATROPHY OF THYROID: ICD-10-CM

## 2019-06-06 DIAGNOSIS — Z12.5 SCREENING FOR PROSTATE CANCER: ICD-10-CM

## 2019-06-06 DIAGNOSIS — E78.2 MIXED HYPERLIPIDEMIA: ICD-10-CM

## 2019-06-06 DIAGNOSIS — R73.01 IFG (IMPAIRED FASTING GLUCOSE): ICD-10-CM

## 2019-06-06 LAB
ALBUMIN SERPL BCP-MCNC: 4.2 G/DL (ref 3.2–4.9)
ALBUMIN/GLOB SERPL: 1.4 G/DL
ALP SERPL-CCNC: 50 U/L (ref 30–99)
ALT SERPL-CCNC: 20 U/L (ref 2–50)
ANION GAP SERPL CALC-SCNC: 7 MMOL/L (ref 0–11.9)
AST SERPL-CCNC: 23 U/L (ref 12–45)
BASOPHILS # BLD AUTO: 1 % (ref 0–1.8)
BASOPHILS # BLD: 0.06 K/UL (ref 0–0.12)
BILIRUB SERPL-MCNC: 0.8 MG/DL (ref 0.1–1.5)
BUN SERPL-MCNC: 21 MG/DL (ref 8–22)
CALCIUM SERPL-MCNC: 9.6 MG/DL (ref 8.5–10.5)
CHLORIDE SERPL-SCNC: 105 MMOL/L (ref 96–112)
CHOLEST SERPL-MCNC: 169 MG/DL (ref 100–199)
CO2 SERPL-SCNC: 27 MMOL/L (ref 20–33)
CREAT SERPL-MCNC: 1.1 MG/DL (ref 0.5–1.4)
CRP SERPL HS-MCNC: 0.5 MG/L (ref 0–7.5)
EOSINOPHIL # BLD AUTO: 0.37 K/UL (ref 0–0.51)
EOSINOPHIL NFR BLD: 6.1 % (ref 0–6.9)
ERYTHROCYTE [DISTWIDTH] IN BLOOD BY AUTOMATED COUNT: 45.4 FL (ref 35.9–50)
EST. AVERAGE GLUCOSE BLD GHB EST-MCNC: 123 MG/DL
FASTING STATUS PATIENT QL REPORTED: NORMAL
GLOBULIN SER CALC-MCNC: 2.9 G/DL (ref 1.9–3.5)
GLUCOSE SERPL-MCNC: 103 MG/DL (ref 65–99)
HBA1C MFR BLD: 5.9 % (ref 0–5.6)
HCT VFR BLD AUTO: 44.1 % (ref 42–52)
HDLC SERPL-MCNC: 42 MG/DL
HGB BLD-MCNC: 15.2 G/DL (ref 14–18)
IMM GRANULOCYTES # BLD AUTO: 0.01 K/UL (ref 0–0.11)
IMM GRANULOCYTES NFR BLD AUTO: 0.2 % (ref 0–0.9)
LDLC SERPL CALC-MCNC: 90 MG/DL
LYMPHOCYTES # BLD AUTO: 1.88 K/UL (ref 1–4.8)
LYMPHOCYTES NFR BLD: 31.2 % (ref 22–41)
MCH RBC QN AUTO: 31.9 PG (ref 27–33)
MCHC RBC AUTO-ENTMCNC: 34.5 G/DL (ref 33.7–35.3)
MCV RBC AUTO: 92.5 FL (ref 81.4–97.8)
MONOCYTES # BLD AUTO: 0.7 K/UL (ref 0–0.85)
MONOCYTES NFR BLD AUTO: 11.6 % (ref 0–13.4)
NEUTROPHILS # BLD AUTO: 3.01 K/UL (ref 1.82–7.42)
NEUTROPHILS NFR BLD: 49.9 % (ref 44–72)
NRBC # BLD AUTO: 0 K/UL
NRBC BLD-RTO: 0 /100 WBC
PLATELET # BLD AUTO: 233 K/UL (ref 164–446)
PMV BLD AUTO: 9.5 FL (ref 9–12.9)
POTASSIUM SERPL-SCNC: 4.2 MMOL/L (ref 3.6–5.5)
PROT SERPL-MCNC: 7.1 G/DL (ref 6–8.2)
PSA SERPL-MCNC: 1.77 NG/ML (ref 0–4)
RBC # BLD AUTO: 4.77 M/UL (ref 4.7–6.1)
SODIUM SERPL-SCNC: 139 MMOL/L (ref 135–145)
TRIGL SERPL-MCNC: 184 MG/DL (ref 0–149)
TSH SERPL DL<=0.005 MIU/L-ACNC: 0.95 UIU/ML (ref 0.38–5.33)
WBC # BLD AUTO: 6 K/UL (ref 4.8–10.8)

## 2019-06-06 PROCEDURE — 84153 ASSAY OF PSA TOTAL: CPT | Mod: GA

## 2019-06-06 PROCEDURE — 83036 HEMOGLOBIN GLYCOSYLATED A1C: CPT | Mod: GA

## 2019-06-06 PROCEDURE — 36415 COLL VENOUS BLD VENIPUNCTURE: CPT

## 2019-06-06 PROCEDURE — 80053 COMPREHEN METABOLIC PANEL: CPT

## 2019-06-06 PROCEDURE — 80061 LIPID PANEL: CPT

## 2019-06-06 PROCEDURE — 86141 C-REACTIVE PROTEIN HS: CPT

## 2019-06-06 PROCEDURE — 85025 COMPLETE CBC W/AUTO DIFF WBC: CPT

## 2019-06-06 PROCEDURE — 84443 ASSAY THYROID STIM HORMONE: CPT

## 2019-06-11 ENCOUNTER — HOSPITAL ENCOUNTER (OUTPATIENT)
Facility: MEDICAL CENTER | Age: 74
End: 2019-06-11
Attending: INTERNAL MEDICINE
Payer: MEDICARE

## 2019-06-11 ENCOUNTER — OFFICE VISIT (OUTPATIENT)
Dept: MEDICAL GROUP | Age: 74
End: 2019-06-11
Payer: MEDICARE

## 2019-06-11 VITALS
HEIGHT: 68 IN | OXYGEN SATURATION: 96 % | TEMPERATURE: 98.1 F | DIASTOLIC BLOOD PRESSURE: 66 MMHG | WEIGHT: 162.4 LBS | SYSTOLIC BLOOD PRESSURE: 108 MMHG | BODY MASS INDEX: 24.61 KG/M2 | HEART RATE: 59 BPM

## 2019-06-11 DIAGNOSIS — R73.01 IFG (IMPAIRED FASTING GLUCOSE): ICD-10-CM

## 2019-06-11 DIAGNOSIS — E78.2 MIXED HYPERLIPIDEMIA: ICD-10-CM

## 2019-06-11 DIAGNOSIS — E03.4 HYPOTHYROIDISM DUE TO ACQUIRED ATROPHY OF THYROID: ICD-10-CM

## 2019-06-11 DIAGNOSIS — R30.0 DYSURIA: ICD-10-CM

## 2019-06-11 DIAGNOSIS — Z12.12 SCREENING FOR COLORECTAL CANCER: ICD-10-CM

## 2019-06-11 DIAGNOSIS — Z12.11 SCREENING FOR COLORECTAL CANCER: ICD-10-CM

## 2019-06-11 PROBLEM — J41.1 BRONCHITIS, MUCOPURULENT RECURRENT (HCC): Status: RESOLVED | Noted: 2018-01-04 | Resolved: 2019-06-11

## 2019-06-11 PROBLEM — N50.89 SCROTAL MASS: Status: ACTIVE | Noted: 2019-06-11

## 2019-06-11 LAB
APPEARANCE UR: CLEAR
BACTERIA #/AREA URNS HPF: NEGATIVE /HPF
BILIRUB UR QL STRIP.AUTO: NEGATIVE
COLOR UR: YELLOW
EPI CELLS #/AREA URNS HPF: NEGATIVE /HPF
GLUCOSE UR STRIP.AUTO-MCNC: NEGATIVE MG/DL
HYALINE CASTS #/AREA URNS LPF: ABNORMAL /LPF
KETONES UR STRIP.AUTO-MCNC: NEGATIVE MG/DL
LEUKOCYTE ESTERASE UR QL STRIP.AUTO: NEGATIVE
MICRO URNS: NORMAL
NITRITE UR QL STRIP.AUTO: NEGATIVE
PH UR STRIP.AUTO: 6 [PH]
PROT UR QL STRIP: NEGATIVE MG/DL
RBC # URNS HPF: ABNORMAL /HPF
RBC UR QL AUTO: NEGATIVE
SP GR UR STRIP.AUTO: 1.02
UROBILINOGEN UR STRIP.AUTO-MCNC: 0.2 MG/DL
WBC #/AREA URNS HPF: ABNORMAL /HPF

## 2019-06-11 PROCEDURE — 81001 URINALYSIS AUTO W/SCOPE: CPT

## 2019-06-11 PROCEDURE — 99214 OFFICE O/P EST MOD 30 MIN: CPT | Performed by: INTERNAL MEDICINE

## 2019-06-11 ASSESSMENT — ENCOUNTER SYMPTOMS
FLANK PAIN: 0
PSYCHIATRIC NEGATIVE: 1
EYES NEGATIVE: 1
GASTROINTESTINAL NEGATIVE: 1
CARDIOVASCULAR NEGATIVE: 1
MUSCULOSKELETAL NEGATIVE: 1
CONSTITUTIONAL NEGATIVE: 1
RESPIRATORY NEGATIVE: 1
NEUROLOGICAL NEGATIVE: 1

## 2019-06-11 NOTE — PROGRESS NOTES
Subjective:      Dusty Eid is a 73 y.o. male who presents with Follow-Up; Hyperlipidemia; and Hypothyroidism        HPI    The patient is here for followup of chronic medical problems listed below. The patient is compliant with medications and having no side effects from them. Denies chest pain, abdominal pain, dyspnea, myalgias, or cough.    Hypothyroidism  Patient is taking levothyroxine 125 mcg, which he is tolerating well without side effects. Most recent TSH from 6/6/19 is 0.950    Dyslipidemia  Patient is taking atorvastatin 20 mg, which he is tolerating well without side effects. Lipid panel from 6/6/19 shows his triglycerides are elevated at 184, and all other results are within normal limits.    Screening for colorectal cancer  Patient is due for a repeat colonoscopy as his last one performed was on 2/12/14 and he is scheduled to have one every five years. He has an appointment to have this performed in July 2019.    IFG  Patient has prior history of impaired fasting glucose. Lab results from 6/6/19 show fasting glucose is elevated at 103 and glycohemoglobin is elevated at 5.9.    Dysuria / Epididymitis  Patient reports that he is having painful urination and believes he feels a growth in his testicle area. Given this he is requesting a urine test to screen for infection. Examination reveals that his lump is his epididymis which is slightly more prominent on one side than the other. He is made aware that to further evaluate the symptoms, he will require an ultrasound of the area.      Patient Active Problem List   Diagnosis   • Hypothyroidism due to acquired atrophy of thyroid   • Mixed hyperlipidemia   • IFG (impaired fasting glucose)   • Fatty liver   • Bronchitis, mucopurulent recurrent (HCC)   • Other male erectile dysfunction       Outpatient Medications Prior to Visit   Medication Sig Dispense Refill   • levothyroxine (SYNTHROID) 125 MCG Tab TAKE ONE TABLET BY MOUTH ONE TIME DAILY  90 Tab 4  "  • amoxicillin (AMOXIL) 875 MG tablet Take 1 Tab by mouth 2 times a day. 30 Tab 0   • atorvastatin (LIPITOR) 20 MG Tab Take 1 Tab by mouth every day. 90 Tab 4   • fluorouracil (EFUDEX) 5 % cream Apply 1 Application to affected area(s) 2 times a day. Two weeks, avoid eyes 40 g 2   • levothyroxine (SYNTHROID) 125 MCG Tab Take 1 Tab by mouth Every morning on an empty stomach. 90 Tab 4   • sildenafil citrate (VIAGRA) 100 MG tablet Take 1 Tab by mouth as needed for Erectile Dysfunction. 90 Tab 4   • aspirin EC (ECOTRIN) 81 MG Tablet Delayed Response Take 81 mg by mouth every day.       No facility-administered medications prior to visit.         Allergies   Allergen Reactions   • Codeine Nausea       Review of Systems   Constitutional: Negative.    HENT: Negative.    Eyes: Negative.    Respiratory: Negative.    Cardiovascular: Negative.    Gastrointestinal: Negative.    Genitourinary: Positive for dysuria. Negative for flank pain, frequency, hematuria and urgency.        Positive: Testicular tenderness   Musculoskeletal: Negative.    Skin: Negative.    Neurological: Negative.    Endo/Heme/Allergies: Negative.    Psychiatric/Behavioral: Negative.    All other systems reviewed and are negative.       Objective:     /66 (BP Location: Right arm, Patient Position: Sitting, BP Cuff Size: Adult)   Pulse (!) 59   Temp 36.7 °C (98.1 °F) (Temporal)   Ht 1.727 m (5' 8\")   Wt 73.7 kg (162 lb 6.4 oz)   SpO2 96%   BMI 24.69 kg/m²     Physical Exam   Constitutional: Oriented to person, place, and time. Appears well-developed. No distress.   Head: Normocephalic and atraumatic.   Right Ear: External ear normal.   Left Ear: External ear normal.   Nose: Nose normal.   Mouth/Throat: Oropharynx is clear and moist. No oropharyngeal exudate.   Eyes: Pupils are equal, round, and reactive to light. Conjunctivae and EOM are normal. Right eye exhibits no discharge. Left eye exhibits no discharge. No scleral icterus.   Neck: Normal " range of motion. Neck supple. No JVD present. No tracheal deviation present. No thyromegaly present.   Cardiovascular: Normal rate, regular rhythm, normal heart sounds and intact distal pulses.  Exam reveals no gallop and no friction rub.    No murmur heard.  Pulmonary/Chest: Effort normal. No stridor. No respiratory distress. No wheezing or rales. No tenderness.   Abdominal: Soft. Bowel sounds are normal. No distension and no mass. There is no tenderness. There is no rebound and no guarding. No hernia.   : Left epididymis is thickened and enlarged at 1.5 cm in diameter and slightly tender.  Musculoskeletal: Normal range of motion No edema or tenderness.   Lymphadenopathy: No cervical adenopathy.   Neurological: Alert and oriented to person, place, and time. Normal reflexes. Normal reflexes. No cranial nerve deficit. Normal muscle tone. Coordination normal.   Skin: Skin is warm and dry. No rash noted. Not diaphoretic. No erythema. No pallor.   Psychiatric: Normal mood and affect. Behavior is normal. Judgment and thought content normal.   Nursing note and vitals reviewed.      Lab Results   Component Value Date/Time    HBA1C 5.9 (H) 06/06/2019 07:11 AM    HBA1C 5.9 (H) 01/28/2019 06:48 AM     Lab Results   Component Value Date/Time    SODIUM 139 06/06/2019 07:11 AM    POTASSIUM 4.2 06/06/2019 07:11 AM    CHLORIDE 105 06/06/2019 07:11 AM    CO2 27 06/06/2019 07:11 AM    GLUCOSE 103 (H) 06/06/2019 07:11 AM    BUN 21 06/06/2019 07:11 AM    CREATININE 1.10 06/06/2019 07:11 AM    CREATININE 1.15 12/09/2010 07:25 AM    BUNCREATRAT 16 12/09/2010 07:25 AM    GLOMRATE >59 12/09/2010 07:25 AM    ALKPHOSPHAT 50 06/06/2019 07:11 AM    ASTSGOT 23 06/06/2019 07:11 AM    ALTSGPT 20 06/06/2019 07:11 AM    TBILIRUBIN 0.8 06/06/2019 07:11 AM     No results found for: INR  Lab Results   Component Value Date/Time    CHOLSTRLTOT 169 06/06/2019 07:11 AM    LDL 90 06/06/2019 07:11 AM    HDL 42 06/06/2019 07:11 AM    TRIGLYCERIDE 184 (H)  06/06/2019 07:11 AM       Lab Results   Component Value Date/Time    TESTOSTERONE 695 06/30/2014 10:29 AM     Lab Results   Component Value Date/Time    TSH 20.800 (H) 03/29/2011 11:33 AM    TSH 20.440 (H) 12/09/2010 07:25 AM     Lab Results   Component Value Date/Time    FREET4 0.97 10/25/2016 09:01 AM    FREET4 0.96 02/24/2016 11:16 AM     No results found for: URICACID  No components found for: VITB12  Lab Results   Component Value Date/Time    25HYDROXY 44 10/25/2016 09:01 AM    25HYDROXY 44.5 12/09/2010 07:25 AM          Assessment/Plan:     1. Screening for colorectal cancer  - Patient will attend his appointment with GI to have his colonoscopy completed in July 2019.  - REFERRAL TO GI FOR COLONOSCOPY    2. Hypothyroidism due to acquired atrophy of thyroid  - Under good control. Continue same regimen of levothyroxine.    3. Mixed hyperlipidemia  - Under good control. Continue same regimen of atorvastatin. Recommended reducing carb and fat intake to decrease triglycerides, and exercise to increase HDL.    4. IFG (impaired fasting glucose)  - Plan for fasting labs prior to next office visit to continue to monitor. Recommended reducing carb and sugar intake to decreased A1C and increasing protein intake.               30 minute face-to-face encounter took place today.  More than half of this time was spent in the coordination of care of the above problems, as well as counseling.     Mynor SHARMA (Isaiah), am scribing for, and in the presence of, Marcial Mcfarlane M.D..    Electronically signed by: Mynor Mello (Isaiah), 6/11/2019    Marcial SHARMA M.D., personally performed the services described in this documentation, as scribed by Mynor Mello in my presence, and it is both accurate and complete.

## 2019-07-24 ENCOUNTER — HOSPITAL ENCOUNTER (OUTPATIENT)
Dept: RADIOLOGY | Facility: MEDICAL CENTER | Age: 74
End: 2019-07-24
Attending: INTERNAL MEDICINE
Payer: MEDICARE

## 2019-07-24 DIAGNOSIS — N50.89 SCROTAL MASS: ICD-10-CM

## 2019-07-24 PROCEDURE — 76870 US EXAM SCROTUM: CPT

## 2019-10-04 RX ORDER — SILDENAFIL 100 MG/1
100 TABLET, FILM COATED ORAL PRN
Qty: 90 TAB | Refills: 0 | Status: SHIPPED | OUTPATIENT
Start: 2019-10-04 | End: 2019-10-15 | Stop reason: SDUPTHER

## 2019-10-15 NOTE — TELEPHONE ENCOUNTER
Was the patient seen in the last year in this department? Yes    Does patient have an active prescription for medications requested? Yes     Medication, Viagra, needs to be faxed to [facility] at [phone]    Received Request Via: Pharmacy

## 2019-10-17 RX ORDER — SILDENAFIL 100 MG/1
100 TABLET, FILM COATED ORAL PRN
Qty: 90 TAB | Refills: 0 | Status: SHIPPED
Start: 2019-10-17 | End: 2020-11-23 | Stop reason: SDUPTHER

## 2020-01-07 ENCOUNTER — OFFICE VISIT (OUTPATIENT)
Dept: MEDICAL GROUP | Age: 75
End: 2020-01-07
Payer: MEDICARE

## 2020-01-07 VITALS
HEIGHT: 68 IN | WEIGHT: 159.2 LBS | DIASTOLIC BLOOD PRESSURE: 72 MMHG | TEMPERATURE: 97.9 F | HEART RATE: 50 BPM | OXYGEN SATURATION: 98 % | SYSTOLIC BLOOD PRESSURE: 108 MMHG | BODY MASS INDEX: 24.13 KG/M2

## 2020-01-07 DIAGNOSIS — Z71.84 TRAVEL ADVICE ENCOUNTER: ICD-10-CM

## 2020-01-07 DIAGNOSIS — R73.01 IFG (IMPAIRED FASTING GLUCOSE): ICD-10-CM

## 2020-01-07 DIAGNOSIS — N50.3 EPIDIDYMAL CYST: ICD-10-CM

## 2020-01-07 DIAGNOSIS — E03.4 HYPOTHYROIDISM DUE TO ACQUIRED ATROPHY OF THYROID: ICD-10-CM

## 2020-01-07 DIAGNOSIS — E78.2 MIXED HYPERLIPIDEMIA: ICD-10-CM

## 2020-01-07 DIAGNOSIS — N52.8 OTHER MALE ERECTILE DYSFUNCTION: ICD-10-CM

## 2020-01-07 PROBLEM — R30.0 DYSURIA: Status: RESOLVED | Noted: 2019-06-11 | Resolved: 2020-01-07

## 2020-01-07 PROCEDURE — 99214 OFFICE O/P EST MOD 30 MIN: CPT | Performed by: INTERNAL MEDICINE

## 2020-01-07 RX ORDER — AMOXICILLIN 875 MG/1
875 TABLET, COATED ORAL 2 TIMES DAILY
Qty: 30 TAB | Refills: 0 | Status: SHIPPED | OUTPATIENT
Start: 2020-01-07 | End: 2020-06-16

## 2020-01-07 ASSESSMENT — PATIENT HEALTH QUESTIONNAIRE - PHQ9: CLINICAL INTERPRETATION OF PHQ2 SCORE: 0

## 2020-01-07 ASSESSMENT — ENCOUNTER SYMPTOMS
GASTROINTESTINAL NEGATIVE: 1
MUSCULOSKELETAL NEGATIVE: 1
RESPIRATORY NEGATIVE: 1
NEUROLOGICAL NEGATIVE: 1
EYES NEGATIVE: 1
PSYCHIATRIC NEGATIVE: 1
CARDIOVASCULAR NEGATIVE: 1
CONSTITUTIONAL NEGATIVE: 1

## 2020-01-07 NOTE — PROGRESS NOTES
Subjective:      Dusty Eid is a 74 y.o. male who presents with Hypothyroidism (follow up)        HPI    The patient is here for followup of chronic medical problems listed below. The patient is compliant with medications and having no side effects from them. Denies chest pain, abdominal pain, dyspnea, myalgias, or cough.    Hypothyroidism due to acquired atrophy of thyroid  Patient continues to take levothyroxine 125 mcg as prescribed which he tolerates well without side effects.    Mixed hyperlipidemia  He has been taking atorvastatin 40 mg as prescribed for his dyslipidemia without myalgias or other side effects.    IFG (impaired fasting glucose)  Patient has a past history of elevated fasting glucose which was last seen on labs from 6/6/19. No new blood work completed since this date to continue to monitor this.    Other male erectile dysfunction  Patient is taking viagra as needed, which he is tolerating well without side effects.    Epididymal cyst- left  Patient was previously complaining of a mass to the left side of testicular area. Ultrasound imaging was performed as part of a follow up which identified this as a left epididymal cyst. No new changes or concerns reported regarding this problem.    Travel advice encounter  Patient states that he is travelling to [place] next week and would like receive his influenza vaccination and is also requesting to have a prescription for antibiotics written incase he contracts an infection.        Patient Active Problem List   Diagnosis   • Hypothyroidism due to acquired atrophy of thyroid   • Mixed hyperlipidemia   • IFG (impaired fasting glucose)   • Fatty liver   • Other male erectile dysfunction   • Epididymal cyst- left       Outpatient Medications Prior to Visit   Medication Sig Dispense Refill   • sildenafil citrate (VIAGRA) 100 MG tablet Take 1 Tab by mouth as needed for Erectile Dysfunction. 90 Tab 0   • levothyroxine (SYNTHROID) 125 MCG Tab TAKE ONE  "TABLET BY MOUTH ONE TIME DAILY  90 Tab 4   • amoxicillin (AMOXIL) 875 MG tablet Take 1 Tab by mouth 2 times a day. 30 Tab 0   • atorvastatin (LIPITOR) 20 MG Tab Take 1 Tab by mouth every day. 90 Tab 4   • fluorouracil (EFUDEX) 5 % cream Apply 1 Application to affected area(s) 2 times a day. Two weeks, avoid eyes 40 g 2   • levothyroxine (SYNTHROID) 125 MCG Tab Take 1 Tab by mouth Every morning on an empty stomach. 90 Tab 4   • aspirin EC (ECOTRIN) 81 MG Tablet Delayed Response Take 81 mg by mouth every day.       No facility-administered medications prior to visit.         Allergies   Allergen Reactions   • Codeine Nausea       Review of Systems   Constitutional: Negative.    HENT: Negative.    Eyes: Negative.    Respiratory: Negative.    Cardiovascular: Negative.    Gastrointestinal: Negative.    Genitourinary: Negative.    Musculoskeletal: Negative.    Skin: Negative.    Neurological: Negative.    Endo/Heme/Allergies: Negative.    Psychiatric/Behavioral: Negative.    All other systems reviewed and are negative.           Objective:     /72 (BP Location: Left arm, Patient Position: Sitting, BP Cuff Size: Adult)   Pulse (!) 50   Temp 36.6 °C (97.9 °F) (Temporal)   Ht 1.727 m (5' 8\")   Wt 72.2 kg (159 lb 3.2 oz)   SpO2 98%   BMI 24.21 kg/m²     Physical Exam   Constitutional: Oriented to person, place, and time. Appears well-developed and well-nourished. No distress.   Head: Normocephalic and atraumatic.   Right Ear: External ear normal.   Left Ear: External ear normal.   Nose: Nose normal.   Mouth/Throat: Oropharynx is clear and moist. No oropharyngeal exudate.   Eyes: Pupils are equal, round, and reactive to light. Conjunctivae and EOM are normal. Right eye exhibits no discharge. Left eye exhibits no discharge. No scleral icterus.   Neck: Normal range of motion. Neck supple. No JVD present. No tracheal deviation present. No thyromegaly present.   Cardiovascular: Normal rate, regular rhythm, normal heart " sounds and intact distal pulses.  Exam reveals no gallop and no friction rub.    No murmur heard.  Pulmonary/Chest: Effort normal. No stridor. No respiratory distress. No wheezing or rales. No tenderness.   Abdominal: Soft. Bowel sounds are normal. No distension and no mass. There is no tenderness. There is no rebound and no guarding. No hernia.   Musculoskeletal: Normal range of motion No edema or tenderness.   Lymphadenopathy: No cervical adenopathy.   Neurological: Alert and oriented to person, place, and time. Normal reflexes. Normal reflexes. No cranial nerve deficit. Normal muscle tone. Coordination normal.   Skin: Skin is warm and dry. No rash noted. Not diaphoretic. No erythema. No pallor.   Psychiatric: Normal mood and affect. Behavior is normal. Judgment and thought content normal.   Nursing note and vitals reviewed.      Lab Results   Component Value Date/Time    HBA1C 5.9 (H) 06/06/2019 07:11 AM    HBA1C 5.9 (H) 01/28/2019 06:48 AM     Lab Results   Component Value Date/Time    SODIUM 139 06/06/2019 07:11 AM    POTASSIUM 4.2 06/06/2019 07:11 AM    CHLORIDE 105 06/06/2019 07:11 AM    CO2 27 06/06/2019 07:11 AM    GLUCOSE 103 (H) 06/06/2019 07:11 AM    BUN 21 06/06/2019 07:11 AM    CREATININE 1.10 06/06/2019 07:11 AM    CREATININE 1.15 12/09/2010 07:25 AM    BUNCREATRAT 16 12/09/2010 07:25 AM    GLOMRATE >59 12/09/2010 07:25 AM    ALKPHOSPHAT 50 06/06/2019 07:11 AM    ASTSGOT 23 06/06/2019 07:11 AM    ALTSGPT 20 06/06/2019 07:11 AM    TBILIRUBIN 0.8 06/06/2019 07:11 AM     No results found for: INR  Lab Results   Component Value Date/Time    CHOLSTRLTOT 169 06/06/2019 07:11 AM    LDL 90 06/06/2019 07:11 AM    HDL 42 06/06/2019 07:11 AM    TRIGLYCERIDE 184 (H) 06/06/2019 07:11 AM       Lab Results   Component Value Date/Time    TESTOSTERONE 695 06/30/2014 10:29 AM     Lab Results   Component Value Date/Time    TSH 20.800 (H) 03/29/2011 11:33 AM    TSH 20.440 (H) 12/09/2010 07:25 AM     Lab Results    Component Value Date/Time    FREET4 0.97 10/25/2016 09:01 AM    FREET4 0.96 02/24/2016 11:16 AM     No results found for: URICACID  No components found for: VITB12  Lab Results   Component Value Date/Time    25HYDROXY 44 10/25/2016 09:01 AM    25HYDROXY 44.5 12/09/2010 07:25 AM          Assessment/Plan:     1. Hypothyroidism due to acquired atrophy of thyroid  Stable on thyroid replacement medications. We will continue the current plan of care. Labs have been ordered for the next follow up visit.  - TSH; Future    2. Mixed hyperlipidemia  No new lipid panel available for comparison. Stable on current atorvastatin dosage. I have advised the patient to increase his exercise regimen and avoid fatty foods. Labs have been ordered for the next follow up visit.   - TSH; Future  - Comp Metabolic Panel; Future  - Lipid Profile; Future  - CBC WITH DIFFERENTIAL; Future    3. IFG (impaired fasting glucose)  This is in the prediabetic range, so we do not need to start him on any medications at this time. I advised him to make diet changes to improve his glucose levels. Advised him to avoid sweets, decrease his carbohydrate intake, exercise regularly and keep a healthy weight.    4. Other male erectile dysfunction  Under good control. Continue same regimen of viagra as needed    5. Travel advice encounter  Influenza vaccine was given today after reviewing risks and benefits as well as side effects of vaccine. He is also provided with a prescription for amoxicillin.  - amoxicillin (AMOXIL) 875 MG tablet; Take 1 Tab by mouth 2 times a day.  Dispense: 30 Tab; Refill: 0    6. Epididymal cyst- left  Patient has been stable with current management. We will make no changes for now    Other orders  - TSH; Future  - Comp Metabolic Panel; Future  - Lipid Profile; Future  - CBC WITH DIFFERENTIAL; Future      I, Mynor Mello (Scribe), am scribing for, and in the presence of, Marcial Mcfarlane M.D..    Electronically signed by: Mynor LEWIS  Riaz (Scribe), 1/7/2020    I, Marcial Mcfarlane M.D., personally performed the services described in this documentation, as scribed by Mynor Mello in my presence, and it is both accurate and complete.

## 2020-03-09 DIAGNOSIS — E03.4 HYPOTHYROIDISM DUE TO ACQUIRED ATROPHY OF THYROID: ICD-10-CM

## 2020-03-09 DIAGNOSIS — E78.2 MIXED HYPERLIPIDEMIA: ICD-10-CM

## 2020-03-09 RX ORDER — ATORVASTATIN CALCIUM 20 MG/1
TABLET, FILM COATED ORAL
Qty: 90 TAB | Refills: 4 | Status: SHIPPED | OUTPATIENT
Start: 2020-03-09 | End: 2020-12-02 | Stop reason: SDUPTHER

## 2020-03-09 RX ORDER — LEVOTHYROXINE SODIUM 0.12 MG/1
TABLET ORAL
Qty: 90 TAB | Refills: 4 | Status: SHIPPED
Start: 2020-03-09 | End: 2020-12-02

## 2020-06-10 ENCOUNTER — TELEPHONE (OUTPATIENT)
Dept: MEDICAL GROUP | Age: 75
End: 2020-06-10

## 2020-06-10 NOTE — TELEPHONE ENCOUNTER
Phone Number Called: [phone] (home)       Call outcome: Did not leave a detailed message. Requested patient to call back.    Message: 1st attempt   Wanting to know which lab test patient I needing

## 2020-06-11 NOTE — TELEPHONE ENCOUNTER
Needs to schedule a virtual video visit first and then I will order.  Need to discuss by video visit ramifications of testing

## 2020-06-11 NOTE — TELEPHONE ENCOUNTER
1. Caller Name: Dusty Eid                          Call Back Number: [phone] (home)         How would the patient prefer to be contacted with a response: [employer] message    2. SPECIFIC Action To Be Taken: Needing COVID-19 antibodies lab ordered     3. Diagnosis/Clinical Reason for Request: Wanting to know if he has had COVID in the past    4. Specialty & Provider Name/Lab/Imaging Location: [facility]    5. Is appointment scheduled for requested order/referral: no    Patient was informed they will receive a return phone call from the office ONLY if there are any questions before processing their request. Advised to call back if they haven't received a call from the referral department in 5 days.

## 2020-06-15 NOTE — TELEPHONE ENCOUNTER
Phone Number Called: [phone] (home)       Call outcome: Spoke to patient regarding message below.    Message: Spoke to pt and scheduled him for tomorrow at 3pm

## 2020-06-16 ENCOUNTER — TELEMEDICINE (OUTPATIENT)
Dept: MEDICAL GROUP | Age: 75
End: 2020-06-16
Payer: MEDICARE

## 2020-06-16 VITALS
SYSTOLIC BLOOD PRESSURE: 118 MMHG | TEMPERATURE: 98.2 F | BODY MASS INDEX: 23.79 KG/M2 | WEIGHT: 157 LBS | RESPIRATION RATE: 12 BRPM | HEIGHT: 68 IN | DIASTOLIC BLOOD PRESSURE: 68 MMHG | OXYGEN SATURATION: 96 % | HEART RATE: 62 BPM

## 2020-06-16 DIAGNOSIS — E03.4 HYPOTHYROIDISM DUE TO ACQUIRED ATROPHY OF THYROID: ICD-10-CM

## 2020-06-16 DIAGNOSIS — E78.2 MIXED HYPERLIPIDEMIA: ICD-10-CM

## 2020-06-16 DIAGNOSIS — N52.8 OTHER MALE ERECTILE DYSFUNCTION: ICD-10-CM

## 2020-06-16 DIAGNOSIS — Z20.822 EXPOSURE TO COVID-19 VIRUS: ICD-10-CM

## 2020-06-16 DIAGNOSIS — R73.01 IMPAIRED FASTING GLUCOSE: ICD-10-CM

## 2020-06-16 DIAGNOSIS — N50.3 EPIDIDYMAL CYST: ICD-10-CM

## 2020-06-16 PROCEDURE — 99214 OFFICE O/P EST MOD 30 MIN: CPT | Mod: 95,CR | Performed by: INTERNAL MEDICINE

## 2020-06-16 ASSESSMENT — FIBROSIS 4 INDEX: FIB4 SCORE: 1.63

## 2020-06-17 NOTE — PROGRESS NOTES
Telemedicine Visit: Established Patient     This encounter was conducted via [employer].   Verbal consent was obtained. Patient's identity was verified.    Subjective:   CC: Med management and lab review and testing for possible COVID-19  Dusty Eid is a 74 y.o. male presenting for evaluation and management of:    .The patient is here for followup of chronic medical problems listed below. The patient is compliant with medications and having no side effects from them. Denies chest pain, abdominal pain, dyspnea, myalgias, or cough.   Patient wants COVID-19 testing done because he is afraid that exposure to his daughter who has p.o. TS syndrome might be at risk.  Once both testing for the active disease as well as possible prior exposure    Patient Active Problem List    Diagnosis Date Noted   • Epididymal cyst- left 06/11/2019   • Other male erectile dysfunction 07/09/2018   • Fatty liver 05/12/2017   • IFG (impaired fasting glucose) 03/29/2017   • Mixed hyperlipidemia 11/25/2015   • Hypothyroidism due to acquired atrophy of thyroid 12/29/2010     Allergies   Allergen Reactions   • Codeine Nausea     Outpatient Medications Prior to Visit   Medication Sig Dispense Refill   • levothyroxine (SYNTHROID) 125 MCG Tab TAKE ONE TABLET BY MOUTH ONE TIME DAILY  90 Tab 4   • atorvastatin (LIPITOR) 20 MG Tab TAKE ONE TABLET BY MOUTH ONE TIME DAILY  90 Tab 4   • sildenafil citrate (VIAGRA) 100 MG tablet Take 1 Tab by mouth as needed for Erectile Dysfunction. 90 Tab 0   • levothyroxine (SYNTHROID) 125 MCG Tab Take 1 Tab by mouth Every morning on an empty stomach. 90 Tab 4   • aspirin EC (ECOTRIN) 81 MG Tablet Delayed Response Take 81 mg by mouth every day.     • amoxicillin (AMOXIL) 875 MG tablet Take 1 Tab by mouth 2 times a day. 30 Tab 0   • fluorouracil (EFUDEX) 5 % cream Apply 1 Application to affected area(s) 2 times a day. Two weeks, avoid eyes (Patient not taking: Reported on 6/16/2020) 40 g 2     No  facility-administered medications prior to visit.      No visits with results within 1 Month(s) from this visit.   Latest known visit with results is:   Hospital Outpatient Visit on 06/11/2019   Component Date Value   • Color 06/11/2019 Yellow    • Character 06/11/2019 Clear    • Specific Gravity 06/11/2019 1.024    • Ph 06/11/2019 6.0    • Glucose 06/11/2019 Negative    • Ketones 06/11/2019 Negative    • Protein 06/11/2019 Negative    • Bilirubin 06/11/2019 Negative    • Urobilinogen, Urine 06/11/2019 0.2    • Nitrite 06/11/2019 Negative    • Leukocyte Esterase 06/11/2019 Negative    • Occult Blood 06/11/2019 Negative    • Micro Urine Req 06/11/2019 Microscopic    • WBC 06/11/2019 0-2*   • RBC 06/11/2019 2-5*   • Bacteria 06/11/2019 Negative    • Epithelial Cells 06/11/2019 Negative    • Hyaline Cast 06/11/2019 0-2       Lab Results   Component Value Date/Time    HBA1C 5.9 (H) 06/06/2019 07:11 AM    HBA1C 5.9 (H) 01/28/2019 06:48 AM     Lab Results   Component Value Date/Time    SODIUM 139 06/06/2019 07:11 AM    POTASSIUM 4.2 06/06/2019 07:11 AM    CHLORIDE 105 06/06/2019 07:11 AM    CO2 27 06/06/2019 07:11 AM    GLUCOSE 103 (H) 06/06/2019 07:11 AM    BUN 21 06/06/2019 07:11 AM    CREATININE 1.10 06/06/2019 07:11 AM    CREATININE 1.15 12/09/2010 07:25 AM    BUNCREATRAT 16 12/09/2010 07:25 AM    GLOMRATE >59 12/09/2010 07:25 AM    ALKPHOSPHAT 50 06/06/2019 07:11 AM    ASTSGOT 23 06/06/2019 07:11 AM    ALTSGPT 20 06/06/2019 07:11 AM    TBILIRUBIN 0.8 06/06/2019 07:11 AM     No results found for: INR  Lab Results   Component Value Date/Time    CHOLSTRLTOT 169 06/06/2019 07:11 AM    LDL 90 06/06/2019 07:11 AM    HDL 42 06/06/2019 07:11 AM    TRIGLYCERIDE 184 (H) 06/06/2019 07:11 AM       Lab Results   Component Value Date/Time    TESTOSTERONE 695 06/30/2014 10:29 AM     Lab Results   Component Value Date/Time    TSH 20.800 (H) 03/29/2011 11:33 AM    TSH 20.440 (H) 12/09/2010 07:25 AM     Lab Results   Component Value  Date/Time    FREET4 0.97 10/25/2016 09:01 AM    FREET4 0.96 02/24/2016 11:16 AM     No results found for: URICACID  No components found for: VITB12  Lab Results   Component Value Date/Time    25HYDROXY 44 10/25/2016 09:01 AM    25HYDROXY 44.5 12/09/2010 07:25 AM     Outpatient Medications Prior to Visit   Medication Sig Dispense Refill   • levothyroxine (SYNTHROID) 125 MCG Tab TAKE ONE TABLET BY MOUTH ONE TIME DAILY  90 Tab 4   • atorvastatin (LIPITOR) 20 MG Tab TAKE ONE TABLET BY MOUTH ONE TIME DAILY  90 Tab 4   • sildenafil citrate (VIAGRA) 100 MG tablet Take 1 Tab by mouth as needed for Erectile Dysfunction. 90 Tab 0   • levothyroxine (SYNTHROID) 125 MCG Tab Take 1 Tab by mouth Every morning on an empty stomach. 90 Tab 4   • aspirin EC (ECOTRIN) 81 MG Tablet Delayed Response Take 81 mg by mouth every day.     • amoxicillin (AMOXIL) 875 MG tablet Take 1 Tab by mouth 2 times a day. 30 Tab 0   • fluorouracil (EFUDEX) 5 % cream Apply 1 Application to affected area(s) 2 times a day. Two weeks, avoid eyes (Patient not taking: Reported on 6/16/2020) 40 g 2     No facility-administered medications prior to visit.                  ROS    Denies any recent fevers or chills. No nausea or vomiting. No chest pains or shortness of breath.     Allergies   Allergen Reactions   • Codeine Nausea       Current medicines (including changes today)  Current Outpatient Medications   Medication Sig Dispense Refill   • levothyroxine (SYNTHROID) 125 MCG Tab TAKE ONE TABLET BY MOUTH ONE TIME DAILY  90 Tab 4   • atorvastatin (LIPITOR) 20 MG Tab TAKE ONE TABLET BY MOUTH ONE TIME DAILY  90 Tab 4   • sildenafil citrate (VIAGRA) 100 MG tablet Take 1 Tab by mouth as needed for Erectile Dysfunction. 90 Tab 0   • levothyroxine (SYNTHROID) 125 MCG Tab Take 1 Tab by mouth Every morning on an empty stomach. 90 Tab 4   • aspirin EC (ECOTRIN) 81 MG Tablet Delayed Response Take 81 mg by mouth every day.       No current facility-administered medications  "for this visit.        Patient Active Problem List    Diagnosis Date Noted   • Epididymal cyst- left 06/11/2019   • Other male erectile dysfunction 07/09/2018   • Fatty liver 05/12/2017   • IFG (impaired fasting glucose) 03/29/2017   • Mixed hyperlipidemia 11/25/2015   • Hypothyroidism due to acquired atrophy of thyroid 12/29/2010       Family History   Problem Relation Age of Onset   • Cancer Father        He  has a past medical history of Cold (3/2017), Erectile dysfunction, Heart burn, Hyperlipidemia, Indigestion, Infectious disease (3/2017), Renal disorder, and Thyroid disease.  He  has a past surgical history that includes hernia repair (Right); eye surgery; and lipoma excision (N/A, 4/19/2017).       Objective:   /68 (BP Location: Right arm, Patient Position: Sitting, BP Cuff Size: Adult)   Pulse 62   Temp 36.8 °C (98.2 °F) (Temporal)   Ht 1.727 m (5' 8\")   Wt 71.2 kg (157 lb)   SpO2 96%   BMI 23.87 kg/m²     Physical Exam:           Constitutional: Alert, no distress, well-groomed.  Skin: No rashes in visible areas.  Eye: Round. Conjunctiva clear, lids normal. No icterus.   ENMT: Lips pink without lesions, good dentition, moist mucous membranes. Phonation normal.  Neck: No masses, no thyromegaly. Moves freely without pain.  CV: Pulse as reported by patient  Respiratory: Unlabored respiratory effort, no cough or audible wheeze  Psych: Alert and oriented x3, normal affect and mood.     No visits with results within 1 Month(s) from this visit.   Latest known visit with results is:   Hospital Outpatient Visit on 06/11/2019   Component Date Value   • Color 06/11/2019 Yellow    • Character 06/11/2019 Clear    • Specific Gravity 06/11/2019 1.024    • Ph 06/11/2019 6.0    • Glucose 06/11/2019 Negative    • Ketones 06/11/2019 Negative    • Protein 06/11/2019 Negative    • Bilirubin 06/11/2019 Negative    • Urobilinogen, Urine 06/11/2019 0.2    • Nitrite 06/11/2019 Negative    • Leukocyte Esterase " 06/11/2019 Negative    • Occult Blood 06/11/2019 Negative    • Micro Urine Req 06/11/2019 Microscopic    • WBC 06/11/2019 0-2*   • RBC 06/11/2019 2-5*   • Bacteria 06/11/2019 Negative    • Epithelial Cells 06/11/2019 Negative    • Hyaline Cast 06/11/2019 0-2       Lab Results   Component Value Date/Time    HBA1C 5.9 (H) 06/06/2019 07:11 AM    HBA1C 5.9 (H) 01/28/2019 06:48 AM     Lab Results   Component Value Date/Time    SODIUM 139 06/06/2019 07:11 AM    POTASSIUM 4.2 06/06/2019 07:11 AM    CHLORIDE 105 06/06/2019 07:11 AM    CO2 27 06/06/2019 07:11 AM    GLUCOSE 103 (H) 06/06/2019 07:11 AM    BUN 21 06/06/2019 07:11 AM    CREATININE 1.10 06/06/2019 07:11 AM    CREATININE 1.15 12/09/2010 07:25 AM    BUNCREATRAT 16 12/09/2010 07:25 AM    GLOMRATE >59 12/09/2010 07:25 AM    ALKPHOSPHAT 50 06/06/2019 07:11 AM    ASTSGOT 23 06/06/2019 07:11 AM    ALTSGPT 20 06/06/2019 07:11 AM    TBILIRUBIN 0.8 06/06/2019 07:11 AM     No results found for: INR  Lab Results   Component Value Date/Time    CHOLSTRLTOT 169 06/06/2019 07:11 AM    LDL 90 06/06/2019 07:11 AM    HDL 42 06/06/2019 07:11 AM    TRIGLYCERIDE 184 (H) 06/06/2019 07:11 AM       Lab Results   Component Value Date/Time    TESTOSTERONE 695 06/30/2014 10:29 AM     Lab Results   Component Value Date/Time    TSH 20.800 (H) 03/29/2011 11:33 AM    TSH 20.440 (H) 12/09/2010 07:25 AM     Lab Results   Component Value Date/Time    FREET4 0.97 10/25/2016 09:01 AM    FREET4 0.96 02/24/2016 11:16 AM     No results found for: URICACID  No components found for: VITB12  Lab Results   Component Value Date/Time    25HYDROXY 44 10/25/2016 09:01 AM    25HYDROXY 44.5 12/09/2010 07:25 AM                Assessment and Plan:   The following treatment plan was discussed:     1. Hypothyroidism due to acquired atrophy of thyroidUnder good control. Continue same regimen  - TSH; Future  .    2. IFG (impaired fasting glucose)-under good control.  Continue current regimen.  - HEMOGLOBIN A1C;  Future    3. Mixed hyperlipidemiaUnder good control. Continue same regimen- TSH; Future  - Comp Metabolic Panel; Future  - Lipid Profile; Future  - CBC WITH DIFFERENTIAL; Future    4. Other male erectile dysfunctionUnder good control. Continue same regimen    5. Epididymal cyst- leftUnder good control. Continue same regimen  6. Exposure to COVID-19 virus  - Miscellaneous Test; Future  - Miscellaneous Test; Future        Follow-up: No follow-ups on file.

## 2020-06-29 ENCOUNTER — HOSPITAL ENCOUNTER (OUTPATIENT)
Dept: LAB | Facility: MEDICAL CENTER | Age: 75
End: 2020-06-29
Attending: INTERNAL MEDICINE
Payer: MEDICARE

## 2020-06-29 DIAGNOSIS — E03.4 HYPOTHYROIDISM DUE TO ACQUIRED ATROPHY OF THYROID: ICD-10-CM

## 2020-06-29 DIAGNOSIS — Z20.822 EXPOSURE TO COVID-19 VIRUS: ICD-10-CM

## 2020-06-29 DIAGNOSIS — E78.2 MIXED HYPERLIPIDEMIA: ICD-10-CM

## 2020-06-29 DIAGNOSIS — R73.01 IMPAIRED FASTING GLUCOSE: ICD-10-CM

## 2020-06-29 LAB
ALBUMIN SERPL BCP-MCNC: 4.2 G/DL (ref 3.2–4.9)
ALBUMIN/GLOB SERPL: 1.6 G/DL
ALP SERPL-CCNC: 35 U/L (ref 30–99)
ALT SERPL-CCNC: 18 U/L (ref 2–50)
ANION GAP SERPL CALC-SCNC: 11 MMOL/L (ref 7–16)
AST SERPL-CCNC: 19 U/L (ref 12–45)
BASOPHILS # BLD AUTO: 1.1 % (ref 0–1.8)
BASOPHILS # BLD: 0.06 K/UL (ref 0–0.12)
BILIRUB SERPL-MCNC: 0.5 MG/DL (ref 0.1–1.5)
BUN SERPL-MCNC: 20 MG/DL (ref 8–22)
CALCIUM SERPL-MCNC: 9.3 MG/DL (ref 8.5–10.5)
CHLORIDE SERPL-SCNC: 105 MMOL/L (ref 96–112)
CHOLEST SERPL-MCNC: 124 MG/DL (ref 100–199)
CO2 SERPL-SCNC: 24 MMOL/L (ref 20–33)
CREAT SERPL-MCNC: 1.01 MG/DL (ref 0.5–1.4)
EOSINOPHIL # BLD AUTO: 0.29 K/UL (ref 0–0.51)
EOSINOPHIL NFR BLD: 5.4 % (ref 0–6.9)
ERYTHROCYTE [DISTWIDTH] IN BLOOD BY AUTOMATED COUNT: 46 FL (ref 35.9–50)
EST. AVERAGE GLUCOSE BLD GHB EST-MCNC: 120 MG/DL
FASTING STATUS PATIENT QL REPORTED: NORMAL
GLOBULIN SER CALC-MCNC: 2.6 G/DL (ref 1.9–3.5)
GLUCOSE SERPL-MCNC: 100 MG/DL (ref 65–99)
HBA1C MFR BLD: 5.8 % (ref 0–5.6)
HCT VFR BLD AUTO: 42.2 % (ref 42–52)
HDLC SERPL-MCNC: 46 MG/DL
HGB BLD-MCNC: 14.6 G/DL (ref 14–18)
IMM GRANULOCYTES # BLD AUTO: 0.02 K/UL (ref 0–0.11)
IMM GRANULOCYTES NFR BLD AUTO: 0.4 % (ref 0–0.9)
LDLC SERPL CALC-MCNC: 63 MG/DL
LYMPHOCYTES # BLD AUTO: 1.64 K/UL (ref 1–4.8)
LYMPHOCYTES NFR BLD: 30.5 % (ref 22–41)
MCH RBC QN AUTO: 32.3 PG (ref 27–33)
MCHC RBC AUTO-ENTMCNC: 34.6 G/DL (ref 33.7–35.3)
MCV RBC AUTO: 93.4 FL (ref 81.4–97.8)
MONOCYTES # BLD AUTO: 0.65 K/UL (ref 0–0.85)
MONOCYTES NFR BLD AUTO: 12.1 % (ref 0–13.4)
NEUTROPHILS # BLD AUTO: 2.72 K/UL (ref 1.82–7.42)
NEUTROPHILS NFR BLD: 50.5 % (ref 44–72)
NRBC # BLD AUTO: 0 K/UL
NRBC BLD-RTO: 0 /100 WBC
PLATELET # BLD AUTO: 280 K/UL (ref 164–446)
PMV BLD AUTO: 9.9 FL (ref 9–12.9)
POTASSIUM SERPL-SCNC: 4.2 MMOL/L (ref 3.6–5.5)
PROT SERPL-MCNC: 6.8 G/DL (ref 6–8.2)
RBC # BLD AUTO: 4.52 M/UL (ref 4.7–6.1)
SARS-COV-2 AB SERPL QL IA: NORMAL
SODIUM SERPL-SCNC: 140 MMOL/L (ref 135–145)
TRIGL SERPL-MCNC: 75 MG/DL (ref 0–149)
TSH SERPL DL<=0.005 MIU/L-ACNC: 0.19 UIU/ML (ref 0.38–5.33)
WBC # BLD AUTO: 5.4 K/UL (ref 4.8–10.8)

## 2020-06-29 PROCEDURE — 85025 COMPLETE CBC W/AUTO DIFF WBC: CPT

## 2020-06-29 PROCEDURE — 80061 LIPID PANEL: CPT

## 2020-06-29 PROCEDURE — 80053 COMPREHEN METABOLIC PANEL: CPT

## 2020-06-29 PROCEDURE — 84443 ASSAY THYROID STIM HORMONE: CPT

## 2020-06-29 PROCEDURE — 86769 SARS-COV-2 COVID-19 ANTIBODY: CPT

## 2020-06-29 PROCEDURE — 36415 COLL VENOUS BLD VENIPUNCTURE: CPT

## 2020-06-29 PROCEDURE — 83036 HEMOGLOBIN GLYCOSYLATED A1C: CPT | Mod: GA

## 2020-07-07 ENCOUNTER — HOSPITAL ENCOUNTER (OUTPATIENT)
Dept: LAB | Facility: MEDICAL CENTER | Age: 75
End: 2020-07-07
Attending: INTERNAL MEDICINE
Payer: MEDICARE

## 2020-07-07 DIAGNOSIS — Z20.822 EXPOSURE TO COVID-19 VIRUS: ICD-10-CM

## 2020-07-07 PROCEDURE — U0003 INFECTIOUS AGENT DETECTION BY NUCLEIC ACID (DNA OR RNA); SEVERE ACUTE RESPIRATORY SYNDROME CORONAVIRUS 2 (SARS-COV-2) (CORONAVIRUS DISEASE [COVID-19]), AMPLIFIED PROBE TECHNIQUE, MAKING USE OF HIGH THROUGHPUT TECHNOLOGIES AS DESCRIBED BY CMS-2020-01-R: HCPCS

## 2020-07-08 LAB
SARS-COV-2 RNA RESP QL NAA+PROBE: NOTDETECTED
SPECIMEN SOURCE: NORMAL

## 2020-07-11 LAB — COVID ORDER STATUS COVID19: NORMAL

## 2020-12-01 ENCOUNTER — TELEPHONE (OUTPATIENT)
Dept: MEDICAL GROUP | Age: 75
End: 2020-12-01

## 2020-12-01 RX ORDER — SILDENAFIL 100 MG/1
100 TABLET, FILM COATED ORAL PRN
Qty: 90 TAB | Refills: 0 | Status: SHIPPED | OUTPATIENT
Start: 2020-12-01 | End: 2020-12-02 | Stop reason: SDUPTHER

## 2020-12-01 NOTE — TELEPHONE ENCOUNTER
ESTABLISHED PATIENT PRE-VISIT PLANNING     Patient was NOT contacted to complete PVP.     Note: Patient will not be contacted if there is no indication to call.     1.  Reviewed notes from the last few office visits within the medical group: Yes    2.  If any orders were placed at last visit or intended to be done for this visit (i.e. 6 mos follow-up), do we have Results/Consult Notes?        •  Labs - Labs were not ordered at last office visit.   Note: If patient appointment is for lab review and patient did not complete labs, check with provider if OK to reschedule patient until labs completed.       •  Imaging - Imaging was not ordered at last office visit.       •  Referrals - No referrals were ordered at last office visit.    3. Is this appointment scheduled as a Hospital Follow-Up? No    4.  Immunizations were updated in Epic using WebIZ?: Epic matches WebIZ       •  Web Iz Recommendations: FLU, TDAP and SHINGRIX (Shingles)    5.  Patient is due for the following Health Maintenance Topics:   Health Maintenance Due   Topic Date Due   • HEPATITIS C SCREENING  1945   • IMM DTaP/Tdap/Td Vaccine (1 - Tdap) 11/14/1964   • IMM ZOSTER VACCINES (1 of 2) 11/14/1995   • Annual Wellness Visit  02/05/2020   • IMM INFLUENZA (1) 09/01/2020           6. Orders for overdue Health Maintenance topics pended in Pre-Charting? N\A      8.  Patient was NOT informed to arrive 15 min prior to their scheduled appointment and bring in their medication bottles.

## 2020-12-02 ENCOUNTER — TELEMEDICINE (OUTPATIENT)
Dept: MEDICAL GROUP | Age: 75
End: 2020-12-02
Payer: MEDICARE

## 2020-12-02 ENCOUNTER — HOSPITAL ENCOUNTER (OUTPATIENT)
Dept: LAB | Facility: MEDICAL CENTER | Age: 75
End: 2020-12-02
Attending: FAMILY MEDICINE
Payer: MEDICARE

## 2020-12-02 VITALS — WEIGHT: 158 LBS | BODY MASS INDEX: 23.95 KG/M2 | HEIGHT: 68 IN

## 2020-12-02 DIAGNOSIS — E03.4 HYPOTHYROIDISM DUE TO ACQUIRED ATROPHY OF THYROID: ICD-10-CM

## 2020-12-02 DIAGNOSIS — E78.2 MIXED HYPERLIPIDEMIA: ICD-10-CM

## 2020-12-02 DIAGNOSIS — Z20.822 EXPOSURE TO COVID-19 VIRUS: ICD-10-CM

## 2020-12-02 LAB
T3FREE SERPL-MCNC: 3.32 PG/ML (ref 2–4.4)
T4 FREE SERPL-MCNC: 1.58 NG/DL (ref 0.93–1.7)
TSH SERPL DL<=0.005 MIU/L-ACNC: 1.06 UIU/ML (ref 0.38–5.33)

## 2020-12-02 PROCEDURE — 36415 COLL VENOUS BLD VENIPUNCTURE: CPT

## 2020-12-02 PROCEDURE — 84443 ASSAY THYROID STIM HORMONE: CPT

## 2020-12-02 PROCEDURE — 99442 PR PHYSICIAN TELEPHONE EVALUATION 11-20 MIN: CPT | Mod: CS,CR | Performed by: FAMILY MEDICINE

## 2020-12-02 PROCEDURE — 84439 ASSAY OF FREE THYROXINE: CPT

## 2020-12-02 PROCEDURE — 84481 FREE ASSAY (FT-3): CPT

## 2020-12-02 RX ORDER — SILDENAFIL 100 MG/1
100 TABLET, FILM COATED ORAL PRN
Qty: 90 TAB | Refills: 2 | Status: SHIPPED
Start: 2020-12-02 | End: 2021-05-05 | Stop reason: SDUPTHER

## 2020-12-02 RX ORDER — ATORVASTATIN CALCIUM 20 MG/1
TABLET, FILM COATED ORAL
Qty: 90 TAB | Refills: 4 | Status: SHIPPED | OUTPATIENT
Start: 2020-12-02 | End: 2022-01-10 | Stop reason: SDUPTHER

## 2020-12-02 ASSESSMENT — FIBROSIS 4 INDEX: FIB4 SCORE: 1.2

## 2020-12-02 NOTE — PROGRESS NOTES
Telephone Appointment Visit   As a means of avoiding spread of COVID-19, this visit is being conducted by telephone. This telephone visit was initiated by the patient and they verbally consented.  Patient was unable to logon Zoom or Doximity.        Reason for Call:  Lab Follow-up    HPI:    The patient is a 75-year-old patient of Dr. Mcfarlane who presents to clinic to discuss labs.  He has a significant past medical history of prediabetes, erectile dysfunction hyperlipidemia fatty liver, hypothyroidism.  Today denies any palpitations no diarrhea no abdominal pain no perspiration no hair loss, no preference for cold or hot temperatures.  He denies any fevers chills night sweats.    Labs / Images Reviewed:     Results for ERLIN SALDAÑA (MRN [id number]) as of 12/2/2020 08:54   Ref. Range 6/29/2020 06:34   TSH Latest Ref Range: 0.380 - 5.330 uIU/mL 0.190 (L)     Assessment and Plan:     1. Hypothyroidism due to acquired atrophy of thyroid  Obtain both T4 and T3 to see if this is subclinical hyperthyroidism or overuse of medication.  We will adjust medication after labs are done.  - TSH+FREE T4  - T3 FREE; Future    2. Mixed hyperlipidemia    - atorvastatin (LIPITOR) 20 MG Tab; TAKE ONE TABLET BY MOUTH ONE TIME DAILY  Dispense: 90 Tab; Refill: 4    3. Exposure to COVID-19 virus  Patient states he is also been exposed to COVID-19 patient and he would like to be tested if he becomes symptomatic.  He is currently asymptomatic.  - COVID/SARS CoV-2 PCR; Future    Other orders  - sildenafil citrate (VIAGRA) 100 MG tablet; Take 1 Tab by mouth as needed for Erectile Dysfunction.  Dispense: 90 Tab; Refill: 2  - aspirin EC (ECOTRIN) 81 MG Tablet Delayed Response; Take 1 Tab by mouth every day.  Dispense: 90 Tab; Refill: 4      Follow-up: With PCP in 3 months      Total Time Spent: 11-20 minutes    Jay Ventura M.D.

## 2020-12-04 ENCOUNTER — PATIENT MESSAGE (OUTPATIENT)
Dept: MEDICAL GROUP | Age: 75
End: 2020-12-04

## 2020-12-04 DIAGNOSIS — E03.4 HYPOTHYROIDISM DUE TO ACQUIRED ATROPHY OF THYROID: ICD-10-CM

## 2020-12-04 RX ORDER — LEVOTHYROXINE SODIUM 0.12 MG/1
125 TABLET ORAL
Qty: 90 TAB | Refills: 4 | Status: SHIPPED | OUTPATIENT
Start: 2020-12-04 | End: 2022-01-10 | Stop reason: SDUPTHER

## 2020-12-04 NOTE — PATIENT COMMUNICATION
Received request via: Patient    Was the patient seen in the last year in this department? Yes    Does the patient have an active prescription (recently filled or refills available) for medication(s) requested? No       REPEAT THYROID LABS COMPLETED AND RESULTED

## 2021-01-11 DIAGNOSIS — Z23 NEED FOR VACCINATION: ICD-10-CM

## 2021-01-17 ENCOUNTER — IMMUNIZATION (OUTPATIENT)
Dept: FAMILY PLANNING/WOMEN'S HEALTH CLINIC | Facility: IMMUNIZATION CENTER | Age: 76
End: 2021-01-17
Attending: INTERNAL MEDICINE
Payer: MEDICARE

## 2021-01-17 DIAGNOSIS — Z23 NEED FOR VACCINATION: ICD-10-CM

## 2021-01-17 DIAGNOSIS — Z23 ENCOUNTER FOR VACCINATION: Primary | ICD-10-CM

## 2021-01-17 PROCEDURE — 0001A PFIZER SARS-COV-2 VACCINE: CPT

## 2021-01-17 PROCEDURE — 91300 PFIZER SARS-COV-2 VACCINE: CPT

## 2021-02-04 ENCOUNTER — IMMUNIZATION (OUTPATIENT)
Dept: FAMILY PLANNING/WOMEN'S HEALTH CLINIC | Facility: IMMUNIZATION CENTER | Age: 76
End: 2021-02-04
Attending: INTERNAL MEDICINE
Payer: MEDICARE

## 2021-02-04 DIAGNOSIS — Z23 ENCOUNTER FOR VACCINATION: Primary | ICD-10-CM

## 2021-02-04 PROCEDURE — 91300 PFIZER SARS-COV-2 VACCINE: CPT | Performed by: PHYSICIAN ASSISTANT

## 2021-02-04 PROCEDURE — 0002A PFIZER SARS-COV-2 VACCINE: CPT | Performed by: PHYSICIAN ASSISTANT

## 2021-05-05 ENCOUNTER — HOSPITAL ENCOUNTER (OUTPATIENT)
Dept: LAB | Facility: MEDICAL CENTER | Age: 76
End: 2021-05-05
Attending: INTERNAL MEDICINE
Payer: MEDICARE

## 2021-05-05 ENCOUNTER — OFFICE VISIT (OUTPATIENT)
Dept: MEDICAL GROUP | Age: 76
End: 2021-05-05
Payer: MEDICARE

## 2021-05-05 VITALS
HEART RATE: 47 BPM | OXYGEN SATURATION: 95 % | WEIGHT: 162 LBS | TEMPERATURE: 98.1 F | SYSTOLIC BLOOD PRESSURE: 120 MMHG | DIASTOLIC BLOOD PRESSURE: 72 MMHG | HEIGHT: 68 IN | BODY MASS INDEX: 24.55 KG/M2

## 2021-05-05 DIAGNOSIS — E03.4 HYPOTHYROIDISM DUE TO ACQUIRED ATROPHY OF THYROID: ICD-10-CM

## 2021-05-05 DIAGNOSIS — E78.2 MIXED HYPERLIPIDEMIA: ICD-10-CM

## 2021-05-05 DIAGNOSIS — J20.9 ACUTE BRONCHITIS, UNSPECIFIED ORGANISM: ICD-10-CM

## 2021-05-05 DIAGNOSIS — Z12.5 SCREENING FOR PROSTATE CANCER: ICD-10-CM

## 2021-05-05 DIAGNOSIS — R19.5 DARK STOOLS: ICD-10-CM

## 2021-05-05 DIAGNOSIS — E55.9 VITAMIN D DEFICIENCY: ICD-10-CM

## 2021-05-05 DIAGNOSIS — R73.01 IMPAIRED FASTING GLUCOSE: ICD-10-CM

## 2021-05-05 DIAGNOSIS — D48.5 NEOPLASM OF UNCERTAIN BEHAVIOR OF SKIN OF FACE: ICD-10-CM

## 2021-05-05 LAB
ALBUMIN SERPL BCP-MCNC: 4 G/DL (ref 3.2–4.9)
ALBUMIN/GLOB SERPL: 1.3 G/DL
ALP SERPL-CCNC: 42 U/L (ref 30–99)
ALT SERPL-CCNC: 16 U/L (ref 2–50)
ANION GAP SERPL CALC-SCNC: 10 MMOL/L (ref 7–16)
AST SERPL-CCNC: 24 U/L (ref 12–45)
BASOPHILS # BLD AUTO: 1 % (ref 0–1.8)
BASOPHILS # BLD: 0.05 K/UL (ref 0–0.12)
BILIRUB SERPL-MCNC: 0.7 MG/DL (ref 0.1–1.5)
BUN SERPL-MCNC: 18 MG/DL (ref 8–22)
CALCIUM SERPL-MCNC: 9.4 MG/DL (ref 8.5–10.5)
CHLORIDE SERPL-SCNC: 106 MMOL/L (ref 96–112)
CHOLEST SERPL-MCNC: 141 MG/DL (ref 100–199)
CO2 SERPL-SCNC: 26 MMOL/L (ref 20–33)
CREAT SERPL-MCNC: 1.11 MG/DL (ref 0.5–1.4)
EOSINOPHIL # BLD AUTO: 0.31 K/UL (ref 0–0.51)
EOSINOPHIL NFR BLD: 5.9 % (ref 0–6.9)
ERYTHROCYTE [DISTWIDTH] IN BLOOD BY AUTOMATED COUNT: 48.3 FL (ref 35.9–50)
EST. AVERAGE GLUCOSE BLD GHB EST-MCNC: 111 MG/DL
FASTING STATUS PATIENT QL REPORTED: NORMAL
GLOBULIN SER CALC-MCNC: 3 G/DL (ref 1.9–3.5)
GLUCOSE SERPL-MCNC: 95 MG/DL (ref 65–99)
HBA1C MFR BLD: 5.5 % (ref 4–5.6)
HCT VFR BLD AUTO: 44.5 % (ref 42–52)
HDLC SERPL-MCNC: 49 MG/DL
HGB BLD-MCNC: 14.8 G/DL (ref 14–18)
IMM GRANULOCYTES # BLD AUTO: 0.01 K/UL (ref 0–0.11)
IMM GRANULOCYTES NFR BLD AUTO: 0.2 % (ref 0–0.9)
LDLC SERPL CALC-MCNC: 79 MG/DL
LYMPHOCYTES # BLD AUTO: 1.59 K/UL (ref 1–4.8)
LYMPHOCYTES NFR BLD: 30.3 % (ref 22–41)
MCH RBC QN AUTO: 32.1 PG (ref 27–33)
MCHC RBC AUTO-ENTMCNC: 33.3 G/DL (ref 33.7–35.3)
MCV RBC AUTO: 96.5 FL (ref 81.4–97.8)
MONOCYTES # BLD AUTO: 0.56 K/UL (ref 0–0.85)
MONOCYTES NFR BLD AUTO: 10.7 % (ref 0–13.4)
NEUTROPHILS # BLD AUTO: 2.73 K/UL (ref 1.82–7.42)
NEUTROPHILS NFR BLD: 51.9 % (ref 44–72)
NRBC # BLD AUTO: 0 K/UL
NRBC BLD-RTO: 0 /100 WBC
PLATELET # BLD AUTO: 282 K/UL (ref 164–446)
PMV BLD AUTO: 9.7 FL (ref 9–12.9)
POTASSIUM SERPL-SCNC: 4.2 MMOL/L (ref 3.6–5.5)
PROT SERPL-MCNC: 7 G/DL (ref 6–8.2)
PSA SERPL-MCNC: 1.41 NG/ML (ref 0–4)
RBC # BLD AUTO: 4.61 M/UL (ref 4.7–6.1)
SODIUM SERPL-SCNC: 142 MMOL/L (ref 135–145)
TRIGL SERPL-MCNC: 67 MG/DL (ref 0–149)
TSH SERPL DL<=0.005 MIU/L-ACNC: 1.06 UIU/ML (ref 0.38–5.33)
WBC # BLD AUTO: 5.3 K/UL (ref 4.8–10.8)

## 2021-05-05 PROCEDURE — 80061 LIPID PANEL: CPT

## 2021-05-05 PROCEDURE — 80053 COMPREHEN METABOLIC PANEL: CPT

## 2021-05-05 PROCEDURE — 82306 VITAMIN D 25 HYDROXY: CPT

## 2021-05-05 PROCEDURE — 83036 HEMOGLOBIN GLYCOSYLATED A1C: CPT | Mod: GA

## 2021-05-05 PROCEDURE — 84153 ASSAY OF PSA TOTAL: CPT | Mod: GA

## 2021-05-05 PROCEDURE — 99214 OFFICE O/P EST MOD 30 MIN: CPT | Performed by: INTERNAL MEDICINE

## 2021-05-05 PROCEDURE — 85025 COMPLETE CBC W/AUTO DIFF WBC: CPT

## 2021-05-05 PROCEDURE — 84443 ASSAY THYROID STIM HORMONE: CPT

## 2021-05-05 PROCEDURE — 36415 COLL VENOUS BLD VENIPUNCTURE: CPT | Mod: GA

## 2021-05-05 RX ORDER — AMOXICILLIN 875 MG/1
875 TABLET, COATED ORAL 2 TIMES DAILY
Qty: 30 TABLET | Refills: 0 | Status: SHIPPED
Start: 2021-05-05 | End: 2021-10-21

## 2021-05-05 RX ORDER — SILDENAFIL 100 MG/1
100 TABLET, FILM COATED ORAL PRN
Qty: 90 TABLET | Refills: 2 | Status: SHIPPED | OUTPATIENT
Start: 2021-05-05 | End: 2021-06-14 | Stop reason: SDUPTHER

## 2021-05-05 ASSESSMENT — FIBROSIS 4 INDEX: FIB4 SCORE: 1.2

## 2021-05-05 ASSESSMENT — ENCOUNTER SYMPTOMS
GASTROINTESTINAL NEGATIVE: 1
RESPIRATORY NEGATIVE: 1
CARDIOVASCULAR NEGATIVE: 1
CONSTITUTIONAL NEGATIVE: 1
NEUROLOGICAL NEGATIVE: 1
EYES NEGATIVE: 1
PSYCHIATRIC NEGATIVE: 1
MUSCULOSKELETAL NEGATIVE: 1

## 2021-05-05 NOTE — PROGRESS NOTES
Subjective:      Dusty Eid is a 75 y.o. male who presents with The patient is here for followup of chronic medical problems listed below. The patient is compliant with medications and having no side effects from them. Denies chest pain, abdominal pain, dyspnea, myalgias, or cough.   Patient Active Problem List    Diagnosis Date Noted   • Vitamin D deficiency 05/05/2021   • Epididymal cyst- left 06/11/2019   • Other male erectile dysfunction 07/09/2018   • Fatty liver 05/12/2017   • IFG (impaired fasting glucose) 03/29/2017   • Mixed hyperlipidemia 11/25/2015   • Hypothyroidism due to acquired atrophy of thyroid 12/29/2010     Allergies   Allergen Reactions   • Codeine Nausea     Outpatient Medications Prior to Visit   Medication Sig Dispense Refill   • levothyroxine (SYNTHROID) 125 MCG Tab Take 1 Tab by mouth Every morning on an empty stomach. 90 Tab 4   • atorvastatin (LIPITOR) 20 MG Tab TAKE ONE TABLET BY MOUTH ONE TIME DAILY 90 Tab 4   • aspirin EC (ECOTRIN) 81 MG Tablet Delayed Response Take 1 Tab by mouth every day. 90 Tab 4   • sildenafil citrate (VIAGRA) 100 MG tablet Take 1 Tab by mouth as needed for Erectile Dysfunction. 90 Tab 2     No facility-administered medications prior to visit.     Allergies   Allergen Reactions   • Codeine Nausea     No visits with results within 1 Month(s) from this visit.   Latest known visit with results is:   Hospital Outpatient Visit on 12/02/2020   Component Date Value   • T3,Free 12/02/2020 3.32    • TSH 12/02/2020 1.060    • Free T-4 12/02/2020 1.58       Lab Results   Component Value Date/Time    HBA1C 5.8 (H) 06/29/2020 06:34 AM    HBA1C 5.9 (H) 06/06/2019 07:11 AM     Lab Results   Component Value Date/Time    SODIUM 140 06/29/2020 06:34 AM    POTASSIUM 4.2 06/29/2020 06:34 AM    CHLORIDE 105 06/29/2020 06:34 AM    CO2 24 06/29/2020 06:34 AM    GLUCOSE 100 (H) 06/29/2020 06:34 AM    BUN 20 06/29/2020 06:34 AM    CREATININE 1.01 06/29/2020 06:34 AM     "CREATININE 1.15 12/09/2010 07:25 AM    BUNCREATRAT 16 12/09/2010 07:25 AM    GLOMRATE >59 12/09/2010 07:25 AM    ALKPHOSPHAT 35 06/29/2020 06:34 AM    ASTSGOT 19 06/29/2020 06:34 AM    ALTSGPT 18 06/29/2020 06:34 AM    TBILIRUBIN 0.5 06/29/2020 06:34 AM     No results found for: INR  Lab Results   Component Value Date/Time    CHOLSTRLTOT 124 06/29/2020 06:34 AM    LDL 63 06/29/2020 06:34 AM    HDL 46 06/29/2020 06:34 AM    TRIGLYCERIDE 75 06/29/2020 06:34 AM       Lab Results   Component Value Date/Time    TESTOSTERONE 695 06/30/2014 10:29 AM     Lab Results   Component Value Date/Time    TSH 20.800 (H) 03/29/2011 11:33 AM    TSH 20.440 (H) 12/09/2010 07:25 AM     Lab Results   Component Value Date/Time    FREET4 1.58 12/02/2020 11:49 AM    FREET4 0.97 10/25/2016 09:01 AM     No results found for: URICACID  No components found for: VITB12  Lab Results   Component Value Date/Time    25HYDROXY 44 10/25/2016 09:01 AM    25HYDROXY 44.5 12/09/2010 07:25 AM               HPI    Review of Systems   Constitutional: Negative.    HENT: Negative.    Eyes: Negative.    Respiratory: Negative.    Cardiovascular: Negative.    Gastrointestinal: Negative.    Genitourinary: Negative.    Musculoskeletal: Negative.    Skin: Negative.    Neurological: Negative.    Endo/Heme/Allergies: Negative.    Psychiatric/Behavioral: Negative.           Objective:     /72 (BP Location: Left arm, Patient Position: Sitting, BP Cuff Size: Adult)   Pulse (!) 47   Temp 36.7 °C (98.1 °F) (Temporal)   Ht 1.727 m (5' 8\")   Wt 73.5 kg (162 lb) Comment: Pt stated  SpO2 95%   BMI 24.63 kg/m²    By auscultation was rechecked by me and was found to be 60/min  Physical Exam  Constitutional:       General: He is not in acute distress.     Appearance: He is well-developed. He is not diaphoretic.   HENT:      Head: Normocephalic and atraumatic.      Right Ear: External ear normal.      Left Ear: External ear normal.      Nose: Nose normal.      " Mouth/Throat:      Pharynx: No oropharyngeal exudate.   Eyes:      General: No scleral icterus.        Right eye: No discharge.         Left eye: No discharge.      Conjunctiva/sclera: Conjunctivae normal.      Pupils: Pupils are equal, round, and reactive to light.   Neck:      Thyroid: No thyromegaly.      Vascular: No JVD.      Trachea: No tracheal deviation.   Cardiovascular:      Rate and Rhythm: Normal rate and regular rhythm.      Heart sounds: Normal heart sounds. No murmur. No friction rub. No gallop.    Pulmonary:      Effort: Pulmonary effort is normal. No respiratory distress.      Breath sounds: Normal breath sounds. No stridor. No wheezing or rales.   Chest:      Chest wall: No tenderness.   Abdominal:      General: Bowel sounds are normal. There is no distension.      Palpations: Abdomen is soft. There is no mass.      Tenderness: There is no abdominal tenderness. There is no guarding or rebound.   Musculoskeletal:         General: No tenderness. Normal range of motion.      Cervical back: Normal range of motion and neck supple.   Lymphadenopathy:      Cervical: No cervical adenopathy.   Skin:     General: Skin is warm and dry.      Coloration: Skin is not pale.      Findings: Erythema and rash present.      Comments: 2 cm area left cheek bone and supple area that is erythematous and scaly consistent with actinic keratosis   Neurological:      Mental Status: He is alert and oriented to person, place, and time.      Motor: No abnormal muscle tone.      Coordination: Coordination normal.      Deep Tendon Reflexes: Reflexes are normal and symmetric. Reflexes normal.   Psychiatric:         Behavior: Behavior normal.         Thought Content: Thought content normal.         Judgment: Judgment normal.                 Assessment/Plan:        1. Acute bronchitis, unspecified organism-this is for preventative care should he have episodes.  No current episode today.  Like to have this on hand while he  travels.  - amoxicillin (AMOXIL) 875 MG tablet; Take 1 tablet by mouth 2 times a day.  Dispense: 30 tablet; Refill: 0    2. Mixed hyperlipidemia  Good control continue current regimen.  Recheck labs  - Lipid Profile; Future  - Comp Metabolic Panel; Future  - TSH; Future  - CBC WITH DIFFERENTIAL; Future    3. IFG (impaired fasting glucose)   Good control continue current regimen.  Recheck labs- HEMOGLOBIN A1C; Future    4. Hypothyroidism due to acquired atrophy of thyroid   Good control continue current regimen.  Recheck labs  - TSH; Future    5. Vitamin D deficiency    Good control continue current regimen.  Recheck labs- VITAMIN D,25 HYDROXY; Future    6. Dark stools-not on iron or Pepto-Bismol.  Rule out melanotic stools.  Call results.  Refer to GI if positive  - OCCULT BLOOD FECES IMMUNOASSAY; Future    7. Screening for prostate cancer   Good control continue current regimen.  Recheck labs  - PROSTATE SPECIFIC AG SCREENING; Future    8. Neoplasm of uncertain behavior of skin of face  Actinic keratosis left cheek bone.  Needs cryotherapy  - REFERRAL TO DERMATOLOGY

## 2021-05-06 LAB — 25(OH)D3 SERPL-MCNC: 35 NG/ML (ref 30–80)

## 2021-06-14 RX ORDER — SILDENAFIL 100 MG/1
100 TABLET, FILM COATED ORAL PRN
Qty: 90 TABLET | Refills: 2 | Status: SHIPPED | OUTPATIENT
Start: 2021-06-14 | End: 2022-05-05 | Stop reason: SDUPTHER

## 2021-10-19 ENCOUNTER — APPOINTMENT (RX ONLY)
Dept: URBAN - METROPOLITAN AREA CLINIC 4 | Facility: CLINIC | Age: 76
Setting detail: DERMATOLOGY
End: 2021-10-19

## 2021-10-19 DIAGNOSIS — L82.1 OTHER SEBORRHEIC KERATOSIS: ICD-10-CM

## 2021-10-19 DIAGNOSIS — L81.4 OTHER MELANIN HYPERPIGMENTATION: ICD-10-CM

## 2021-10-19 DIAGNOSIS — L57.0 ACTINIC KERATOSIS: ICD-10-CM

## 2021-10-19 DIAGNOSIS — Z71.89 OTHER SPECIFIED COUNSELING: ICD-10-CM

## 2021-10-19 DIAGNOSIS — D18.0 HEMANGIOMA: ICD-10-CM

## 2021-10-19 DIAGNOSIS — D22 MELANOCYTIC NEVI: ICD-10-CM

## 2021-10-19 DIAGNOSIS — L85.3 XEROSIS CUTIS: ICD-10-CM

## 2021-10-19 PROBLEM — D22.62 MELANOCYTIC NEVI OF LEFT UPPER LIMB, INCLUDING SHOULDER: Status: ACTIVE | Noted: 2021-10-19

## 2021-10-19 PROBLEM — D22.5 MELANOCYTIC NEVI OF TRUNK: Status: ACTIVE | Noted: 2021-10-19

## 2021-10-19 PROBLEM — D18.01 HEMANGIOMA OF SKIN AND SUBCUTANEOUS TISSUE: Status: ACTIVE | Noted: 2021-10-19

## 2021-10-19 PROBLEM — D22.61 MELANOCYTIC NEVI OF RIGHT UPPER LIMB, INCLUDING SHOULDER: Status: ACTIVE | Noted: 2021-10-19

## 2021-10-19 PROCEDURE — ? ADDITIONAL NOTES

## 2021-10-19 PROCEDURE — ? COUNSELING

## 2021-10-19 PROCEDURE — ? LIQUID NITROGEN

## 2021-10-19 PROCEDURE — 17003 DESTRUCT PREMALG LES 2-14: CPT

## 2021-10-19 PROCEDURE — ? REFERRAL CORRESPONDENCE

## 2021-10-19 PROCEDURE — 17000 DESTRUCT PREMALG LESION: CPT

## 2021-10-19 PROCEDURE — 99203 OFFICE O/P NEW LOW 30 MIN: CPT | Mod: 25

## 2021-10-19 ASSESSMENT — LOCATION DETAILED DESCRIPTION DERM
LOCATION DETAILED: RIGHT INFERIOR CENTRAL MALAR CHEEK
LOCATION DETAILED: LEFT ANTERIOR DISTAL UPPER ARM
LOCATION DETAILED: RIGHT ULNAR DORSAL HAND
LOCATION DETAILED: RIGHT CENTRAL MALAR CHEEK
LOCATION DETAILED: LEFT CENTRAL MALAR CHEEK
LOCATION DETAILED: RIGHT SUPERIOR FOREHEAD
LOCATION DETAILED: LEFT RADIAL DORSAL HAND
LOCATION DETAILED: LEFT DISTAL DORSAL FOREARM
LOCATION DETAILED: LEFT VENTRAL PROXIMAL FOREARM
LOCATION DETAILED: LEFT DISTAL POSTERIOR UPPER ARM
LOCATION DETAILED: RIGHT SUPERIOR PREAURICULAR CHEEK
LOCATION DETAILED: NASAL TIP
LOCATION DETAILED: SUPERIOR THORACIC SPINE
LOCATION DETAILED: XIPHOID
LOCATION DETAILED: RIGHT DISTAL POSTERIOR UPPER ARM
LOCATION DETAILED: RIGHT ANTERIOR DISTAL UPPER ARM
LOCATION DETAILED: RIGHT INFERIOR OCCIPITAL SCALP
LOCATION DETAILED: RIGHT PROXIMAL DORSAL FOREARM
LOCATION DETAILED: SUPERIOR MID FOREHEAD
LOCATION DETAILED: RIGHT MEDIAL UPPER BACK
LOCATION DETAILED: LEFT MEDIAL ZYGOMA
LOCATION DETAILED: STERNUM
LOCATION DETAILED: RIGHT VENTRAL PROXIMAL FOREARM
LOCATION DETAILED: RIGHT INFERIOR HELIX
LOCATION DETAILED: RIGHT CENTRAL FRONTAL SCALP
LOCATION DETAILED: PERIUMBILICAL SKIN

## 2021-10-19 ASSESSMENT — LOCATION SIMPLE DESCRIPTION DERM
LOCATION SIMPLE: LEFT CHEEK
LOCATION SIMPLE: RIGHT SCALP
LOCATION SIMPLE: RIGHT FOREARM
LOCATION SIMPLE: RIGHT UPPER ARM
LOCATION SIMPLE: ABDOMEN
LOCATION SIMPLE: LEFT HAND
LOCATION SIMPLE: SUPERIOR FOREHEAD
LOCATION SIMPLE: RIGHT EAR
LOCATION SIMPLE: RIGHT FOREHEAD
LOCATION SIMPLE: CHEST
LOCATION SIMPLE: LEFT ZYGOMA
LOCATION SIMPLE: UPPER BACK
LOCATION SIMPLE: RIGHT CHEEK
LOCATION SIMPLE: LEFT UPPER ARM
LOCATION SIMPLE: RIGHT HAND
LOCATION SIMPLE: NOSE
LOCATION SIMPLE: SCALP
LOCATION SIMPLE: LEFT FOREARM
LOCATION SIMPLE: RIGHT UPPER BACK

## 2021-10-19 ASSESSMENT — LOCATION ZONE DERM
LOCATION ZONE: FACE
LOCATION ZONE: EAR
LOCATION ZONE: TRUNK
LOCATION ZONE: HAND
LOCATION ZONE: SCALP
LOCATION ZONE: NOSE
LOCATION ZONE: ARM

## 2021-10-19 NOTE — PROCEDURE: ADDITIONAL NOTES
Detail Level: Simple
Render Risk Assessment In Note?: no
Additional Notes: Recommended use of OTC CeraVe Moisturizing Cream.  Recommended discontinuing use of Amlactin Moisturizing Lotion as he does not currently need a keratolytic moisturizer.

## 2021-10-19 NOTE — PROCEDURE: COUNSELING
Sunscreen Recommendations: Recommended broad spectrum inorganic or physical sunscreens primarily containing zinc oxide or titanium dioxide.
Detail Level: Generalized
Detail Level: Zone

## 2021-10-19 NOTE — PROCEDURE: LIQUID NITROGEN
Duration Of Freeze Thaw-Cycle (Seconds): 8
Consent: The patient's consent was obtained including but not limited to risks of crusting, scabbing, blistering, scarring, darker or lighter pigmentary change, recurrence, incomplete removal and infection.
Show Aperture Variable?: Yes
Render Note In Bullet Format When Appropriate: No
Detail Level: Zone
Number Of Freeze-Thaw Cycles: 2 freeze-thaw cycles
Post-Care Instructions: I reviewed with the patient in detail post-care instructions. Patient is to wear sunprotection, and avoid picking at any of the treated lesions. Pt may apply Vaseline to crusted or scabbing areas.

## 2021-10-21 ENCOUNTER — OFFICE VISIT (OUTPATIENT)
Dept: MEDICAL GROUP | Age: 76
End: 2021-10-21
Payer: MEDICARE

## 2021-10-21 VITALS
RESPIRATION RATE: 16 BRPM | HEART RATE: 68 BPM | BODY MASS INDEX: 24.34 KG/M2 | HEIGHT: 68 IN | TEMPERATURE: 98.5 F | WEIGHT: 160.6 LBS | OXYGEN SATURATION: 95 % | SYSTOLIC BLOOD PRESSURE: 118 MMHG | DIASTOLIC BLOOD PRESSURE: 70 MMHG

## 2021-10-21 DIAGNOSIS — Z02.4 ENCOUNTER FOR DEPARTMENT OF TRANSPORTATION (DOT) EXAMINATION FOR DRIVING LICENSE RENEWAL: ICD-10-CM

## 2021-10-21 DIAGNOSIS — Z23 NEED FOR VACCINATION: ICD-10-CM

## 2021-10-21 PROCEDURE — 90662 IIV NO PRSV INCREASED AG IM: CPT | Performed by: FAMILY MEDICINE

## 2021-10-21 PROCEDURE — G0008 ADMIN INFLUENZA VIRUS VAC: HCPCS | Performed by: FAMILY MEDICINE

## 2021-10-21 PROCEDURE — 7105 PR DMV PHYSICAL: Performed by: FAMILY MEDICINE

## 2021-10-21 ASSESSMENT — PATIENT HEALTH QUESTIONNAIRE - PHQ9: CLINICAL INTERPRETATION OF PHQ2 SCORE: 0

## 2021-10-21 ASSESSMENT — FIBROSIS 4 INDEX: FIB4 SCORE: 1.6

## 2021-10-21 NOTE — PROGRESS NOTES
This medical record contains text that has been entered with the assistance of computer voice recognition and dictation software.  Therefore, it may contain unintended errors in text, spelling, punctuation, or grammar      Chief Complaint   Patient presents with   • Paperwork   • Immunizations     Flu         Dusty iEd is a 75 y.o. male here evaluation and management of: dmv      HPI:   1. Encounter for Department of Transportation (DOT) examination for driving license renewal    Dusty is a very pleasant 75-year-old male who presents to clinic to proceed with driving clearance.  He has no medical conditions that would impair his driving ability and takes no medications that would impair his driving ability.Today the patient denied any fevers, chills, headaches, nausea, vomiting, changes in vision, shortness of breath, back pain, chest pain, nausea or vomiting, change in taste or smell, new rashes, joint pain, blood in stool dark tarry stool.      2. Need for vaccination  Due for flu vaccine    Current medicines (including changes today)  Current Outpatient Medications   Medication Sig Dispense Refill   • sildenafil citrate (VIAGRA) 100 MG tablet Take 1 tablet by mouth as needed for Erectile Dysfunction. 90 tablet 2   • levothyroxine (SYNTHROID) 125 MCG Tab Take 1 Tab by mouth Every morning on an empty stomach. 90 Tab 4   • atorvastatin (LIPITOR) 20 MG Tab TAKE ONE TABLET BY MOUTH ONE TIME DAILY 90 Tab 4   • aspirin EC (ECOTRIN) 81 MG Tablet Delayed Response Take 1 Tab by mouth every day. 90 Tab 4     No current facility-administered medications for this visit.     He  has a past medical history of Cold (3/2017), Erectile dysfunction, Heart burn, Hyperlipidemia, Indigestion, Infectious disease (3/2017), Renal disorder, and Thyroid disease.  He  has a past surgical history that includes hernia repair (Right); eye surgery; and lipoma excision (N/A, 4/19/2017).  Social History     Tobacco Use   • Smoking  "status: Never Smoker   • Smokeless tobacco: Never Used   Vaping Use   • Vaping Use: Never used   Substance Use Topics   • Alcohol use: Yes     Alcohol/week: 0.0 oz     Comment: Occasional wine    • Drug use: No     Social History     Social History Narrative   • Not on file     Family History   Problem Relation Age of Onset   • Cancer Father      Family Status   Relation Name Status   • Mo     • Fa     • Sis  Alive   • Bro     • MGMo     • MGFa     • PGMo     • PGFa     • Bro  Alive   • Bro  Alive         ROS    The pertinent  ROS findings can be seen in the HPI above.     All other systems reviewed and are negative     Objective:     /70 (BP Location: Right arm, Patient Position: Sitting, BP Cuff Size: Adult)   Pulse 68   Temp 36.9 °C (98.5 °F) (Temporal)   Resp 16   Ht 1.727 m (5' 8\")   Wt 72.8 kg (160 lb 9.6 oz)   SpO2 95%  Body mass index is 24.42 kg/m².      Physical Exam:    Constitutional: Alert, no distress.  Skin: No suspicious lesions  Eye: Equal, round and reactive, conjunctiva clear, lids normal.  ENMT: Lips without lesions, good dentition, oropharynx clear.  Neck: Trachea midline, no masses, no thyromegaly. No cervical or supraclavicular lymphadenopathy.  Respiratory: Unlabored respiratory effort, lungs clear to auscultation, no wheezes, no ronchi.  Cardiovascular: Normal S1, S2, no murmur, no edema  Abdomen: Soft, non-tender, no masses, no hepatosplenomegaly.        Assessment and Plan:   The following treatment plan was discussed    1. Encounter for Department of Transportation (DOT) examination for driving license renewal    Cleared to proceed with driving  No medical concerns which would cause impaired ability    2. Need for vaccination    Vaccine was administered today without adverse event.    - INFLUENZA VACCINE, HIGH DOSE (65+ ONLY)            Instructed to Follow up in clinic or ER for worsening symptoms, difficulty breathing, " lack of expected recovery, or should new symptoms or problems arise.    Followup: Return in about 6 months (around 4/21/2022) for Reevaluation, With PCP.

## 2022-01-10 DIAGNOSIS — E78.2 MIXED HYPERLIPIDEMIA: ICD-10-CM

## 2022-01-10 DIAGNOSIS — E03.4 HYPOTHYROIDISM DUE TO ACQUIRED ATROPHY OF THYROID: ICD-10-CM

## 2022-01-10 RX ORDER — ATORVASTATIN CALCIUM 20 MG/1
TABLET, FILM COATED ORAL
Qty: 90 TABLET | Refills: 1 | Status: SHIPPED | OUTPATIENT
Start: 2022-01-10 | End: 2022-05-05 | Stop reason: SDUPTHER

## 2022-01-10 RX ORDER — LEVOTHYROXINE SODIUM 0.12 MG/1
125 TABLET ORAL
Qty: 90 TABLET | Refills: 1 | Status: SHIPPED | OUTPATIENT
Start: 2022-01-10 | End: 2022-05-05 | Stop reason: SDUPTHER

## 2022-05-05 ENCOUNTER — HOSPITAL ENCOUNTER (OUTPATIENT)
Dept: LAB | Facility: MEDICAL CENTER | Age: 77
End: 2022-05-05
Attending: INTERNAL MEDICINE
Payer: MEDICARE

## 2022-05-05 ENCOUNTER — OFFICE VISIT (OUTPATIENT)
Dept: MEDICAL GROUP | Age: 77
End: 2022-05-05
Payer: MEDICARE

## 2022-05-05 VITALS
RESPIRATION RATE: 16 BRPM | WEIGHT: 159 LBS | DIASTOLIC BLOOD PRESSURE: 68 MMHG | BODY MASS INDEX: 24.1 KG/M2 | TEMPERATURE: 98.1 F | HEIGHT: 68 IN | SYSTOLIC BLOOD PRESSURE: 108 MMHG | HEART RATE: 60 BPM | OXYGEN SATURATION: 98 %

## 2022-05-05 DIAGNOSIS — Z11.59 NEED FOR HEPATITIS C SCREENING TEST: ICD-10-CM

## 2022-05-05 DIAGNOSIS — I10 ESSENTIAL HYPERTENSION: ICD-10-CM

## 2022-05-05 DIAGNOSIS — N52.9 VASCULOGENIC ERECTILE DYSFUNCTION, UNSPECIFIED VASCULOGENIC ERECTILE DYSFUNCTION TYPE: ICD-10-CM

## 2022-05-05 DIAGNOSIS — E78.2 MIXED HYPERLIPIDEMIA: ICD-10-CM

## 2022-05-05 DIAGNOSIS — Z23 NEED FOR VACCINATION: ICD-10-CM

## 2022-05-05 DIAGNOSIS — R73.01 IFG (IMPAIRED FASTING GLUCOSE): ICD-10-CM

## 2022-05-05 DIAGNOSIS — Z71.84 ENCOUNTER FOR HEALTH COUNSELING RELATED TO TRAVEL: ICD-10-CM

## 2022-05-05 DIAGNOSIS — Z00.00 MEDICARE ANNUAL WELLNESS VISIT, SUBSEQUENT: ICD-10-CM

## 2022-05-05 DIAGNOSIS — E78.5 DYSLIPIDEMIA: ICD-10-CM

## 2022-05-05 DIAGNOSIS — Z12.5 SCREENING FOR PROSTATE CANCER: ICD-10-CM

## 2022-05-05 DIAGNOSIS — E55.9 VITAMIN D DEFICIENCY: ICD-10-CM

## 2022-05-05 DIAGNOSIS — E03.4 HYPOTHYROIDISM DUE TO ACQUIRED ATROPHY OF THYROID: ICD-10-CM

## 2022-05-05 DIAGNOSIS — L60.0 INGROWING TOENAIL: ICD-10-CM

## 2022-05-05 LAB
25(OH)D3 SERPL-MCNC: 37 NG/ML (ref 30–100)
ALBUMIN SERPL BCP-MCNC: 4.5 G/DL (ref 3.2–4.9)
ALBUMIN/GLOB SERPL: 1.6 G/DL
ALP SERPL-CCNC: 51 U/L (ref 30–99)
ALT SERPL-CCNC: 20 U/L (ref 2–50)
ANION GAP SERPL CALC-SCNC: 9 MMOL/L (ref 7–16)
AST SERPL-CCNC: 29 U/L (ref 12–45)
BASOPHILS # BLD AUTO: 1 % (ref 0–1.8)
BASOPHILS # BLD: 0.07 K/UL (ref 0–0.12)
BILIRUB SERPL-MCNC: 0.7 MG/DL (ref 0.1–1.5)
BUN SERPL-MCNC: 23 MG/DL (ref 8–22)
CALCIUM SERPL-MCNC: 9.6 MG/DL (ref 8.5–10.5)
CHLORIDE SERPL-SCNC: 107 MMOL/L (ref 96–112)
CHOLEST SERPL-MCNC: 153 MG/DL (ref 100–199)
CO2 SERPL-SCNC: 26 MMOL/L (ref 20–33)
CREAT SERPL-MCNC: 0.91 MG/DL (ref 0.5–1.4)
EOSINOPHIL # BLD AUTO: 0.35 K/UL (ref 0–0.51)
EOSINOPHIL NFR BLD: 4.8 % (ref 0–6.9)
ERYTHROCYTE [DISTWIDTH] IN BLOOD BY AUTOMATED COUNT: 46.5 FL (ref 35.9–50)
EST. AVERAGE GLUCOSE BLD GHB EST-MCNC: 123 MG/DL
FASTING STATUS PATIENT QL REPORTED: NORMAL
GFR SERPLBLD CREATININE-BSD FMLA CKD-EPI: 87 ML/MIN/1.73 M 2
GLOBULIN SER CALC-MCNC: 2.8 G/DL (ref 1.9–3.5)
GLUCOSE SERPL-MCNC: 104 MG/DL (ref 65–99)
HBA1C MFR BLD: 5.9 % (ref 4–5.6)
HCT VFR BLD AUTO: 45.6 % (ref 42–52)
HCV AB SER QL: NORMAL
HDLC SERPL-MCNC: 53 MG/DL
HGB BLD-MCNC: 15.5 G/DL (ref 14–18)
IMM GRANULOCYTES # BLD AUTO: 0.01 K/UL (ref 0–0.11)
IMM GRANULOCYTES NFR BLD AUTO: 0.1 % (ref 0–0.9)
LDLC SERPL CALC-MCNC: 89 MG/DL
LYMPHOCYTES # BLD AUTO: 1.62 K/UL (ref 1–4.8)
LYMPHOCYTES NFR BLD: 22.1 % (ref 22–41)
MCH RBC QN AUTO: 31.5 PG (ref 27–33)
MCHC RBC AUTO-ENTMCNC: 34 G/DL (ref 33.7–35.3)
MCV RBC AUTO: 92.7 FL (ref 81.4–97.8)
MONOCYTES # BLD AUTO: 0.77 K/UL (ref 0–0.85)
MONOCYTES NFR BLD AUTO: 10.5 % (ref 0–13.4)
NEUTROPHILS # BLD AUTO: 4.51 K/UL (ref 1.82–7.42)
NEUTROPHILS NFR BLD: 61.5 % (ref 44–72)
NRBC # BLD AUTO: 0 K/UL
NRBC BLD-RTO: 0 /100 WBC
PLATELET # BLD AUTO: 310 K/UL (ref 164–446)
PMV BLD AUTO: 9.5 FL (ref 9–12.9)
POTASSIUM SERPL-SCNC: 4.5 MMOL/L (ref 3.6–5.5)
PROT SERPL-MCNC: 7.3 G/DL (ref 6–8.2)
PSA SERPL-MCNC: 1.6 NG/ML (ref 0–4)
RBC # BLD AUTO: 4.92 M/UL (ref 4.7–6.1)
SODIUM SERPL-SCNC: 142 MMOL/L (ref 135–145)
TRIGL SERPL-MCNC: 55 MG/DL (ref 0–149)
TSH SERPL DL<=0.005 MIU/L-ACNC: 0.16 UIU/ML (ref 0.38–5.33)
WBC # BLD AUTO: 7.3 K/UL (ref 4.8–10.8)

## 2022-05-05 PROCEDURE — 84443 ASSAY THYROID STIM HORMONE: CPT

## 2022-05-05 PROCEDURE — 83036 HEMOGLOBIN GLYCOSYLATED A1C: CPT | Mod: GA

## 2022-05-05 PROCEDURE — G0472 HEP C SCREEN HIGH RISK/OTHER: HCPCS

## 2022-05-05 PROCEDURE — 85025 COMPLETE CBC W/AUTO DIFF WBC: CPT

## 2022-05-05 PROCEDURE — G0439 PPPS, SUBSEQ VISIT: HCPCS | Performed by: INTERNAL MEDICINE

## 2022-05-05 PROCEDURE — 80061 LIPID PANEL: CPT

## 2022-05-05 PROCEDURE — 80053 COMPREHEN METABOLIC PANEL: CPT

## 2022-05-05 PROCEDURE — 36415 COLL VENOUS BLD VENIPUNCTURE: CPT

## 2022-05-05 PROCEDURE — 82306 VITAMIN D 25 HYDROXY: CPT

## 2022-05-05 PROCEDURE — 84153 ASSAY OF PSA TOTAL: CPT | Mod: GA

## 2022-05-05 RX ORDER — SILDENAFIL 100 MG/1
100 TABLET, FILM COATED ORAL PRN
Qty: 90 TABLET | Refills: 2 | Status: SHIPPED
Start: 2022-05-05 | End: 2022-06-16 | Stop reason: SDUPTHER

## 2022-05-05 RX ORDER — LEVOTHYROXINE SODIUM 0.12 MG/1
125 TABLET ORAL
Qty: 90 TABLET | Refills: 1 | Status: SHIPPED | OUTPATIENT
Start: 2022-05-05 | End: 2022-12-14

## 2022-05-05 RX ORDER — AMOXICILLIN 500 MG/1
500 CAPSULE ORAL 3 TIMES DAILY
Qty: 30 CAPSULE | Refills: 1 | Status: SHIPPED
Start: 2022-05-05 | End: 2022-08-22

## 2022-05-05 RX ORDER — ATORVASTATIN CALCIUM 20 MG/1
TABLET, FILM COATED ORAL
Qty: 90 TABLET | Refills: 1 | Status: SHIPPED | OUTPATIENT
Start: 2022-05-05 | End: 2023-01-03 | Stop reason: SDUPTHER

## 2022-05-05 ASSESSMENT — ENCOUNTER SYMPTOMS: GENERAL WELL-BEING: EXCELLENT

## 2022-05-05 ASSESSMENT — ACTIVITIES OF DAILY LIVING (ADL): BATHING_REQUIRES_ASSISTANCE: 0

## 2022-05-05 ASSESSMENT — PATIENT HEALTH QUESTIONNAIRE - PHQ9: CLINICAL INTERPRETATION OF PHQ2 SCORE: 0

## 2022-05-05 ASSESSMENT — FIBROSIS 4 INDEX: FIB4 SCORE: 1.62

## 2022-05-05 NOTE — PROGRESS NOTES
Chief Complaint   Patient presents with   • Annual Exam         HPI:  Dusty is a 76 y.o. here for Medicare Annual Wellness Visit         Patient Active Problem List    Diagnosis Date Noted   • Vitamin D deficiency 05/05/2021   • Epididymal cyst- left 06/11/2019   • Other male erectile dysfunction 07/09/2018   • Fatty liver 05/12/2017   • Encounter for Department of Transportation (DOT) examination for driving license renewal 03/29/2017   • IFG (impaired fasting glucose) 03/29/2017   • Mixed hyperlipidemia 11/25/2015   • Hypothyroidism due to acquired atrophy of thyroid 12/29/2010       Current Outpatient Medications   Medication Sig Dispense Refill   • atorvastatin (LIPITOR) 20 MG Tab TAKE ONE TABLET BY MOUTH ONE TIME DAILY 90 Tablet 1   • levothyroxine (SYNTHROID) 125 MCG Tab Take 1 Tablet by mouth every morning on an empty stomach. 90 Tablet 1   • sildenafil citrate (VIAGRA) 100 MG tablet Take 1 tablet by mouth as needed for Erectile Dysfunction. 90 tablet 2   • aspirin EC (ECOTRIN) 81 MG Tablet Delayed Response Take 1 Tab by mouth every day. 90 Tab 4     No current facility-administered medications for this visit.        Patient is taking medications as noted in medication list.  Current supplements as per medication list.     Allergies: Codeine  Current social contact/activities:  active     Is patient current with immunizations? Yes.    He  reports that he has never smoked. He has never used smokeless tobacco. He reports current alcohol use. He reports that he does not use drugs.  Counseling given: Not Answered        DPA/Advanced directive: Patient has Advanced Directive on file.     ROS:    Gait: Uses no assistive device     Ostomy: No    Other tubes: No    Amputations: No    Chronic oxygen use No    Last eye exam 08/2021     Wears hearing aids: No    : Denies any urinary leakage during the last 6 months       Screening:         Depression Screening  Little interest or pleasure in doing things?  0 - not  at all  Feeling down, depressed, or hopeless? 0 - not at all  Patient Health Questionnaire Score: 0    If depressive symptoms identified deferred to follow up visit unless specifically addressed in assessment and plan.    Interpretation of PHQ-9 Total Score   Score Severity   1-4 No Depression   5-9 Mild Depression   10-14 Moderate Depression   15-19 Moderately Severe Depression   20-27 Severe Depression    Screening for Cognitive Impairment  Three Minute Recall (daughter, heaven, august)  2/3    David clock face with all 12 numbers and set the hands to show 10 past 11.  No    If cognitive concerns identified, deferred for follow up unless specifically addressed in assessment and plan.    Fall Risk Assessment  Has the patient had two or more falls in the last year or any fall with injury in the last year?  No  If fall risk identified, deferred for follow up unless specifically addressed in assessment and plan.    Safety Assessment  Throw rugs on floor.  Yes  Handrails on all stairs.  Yes  Good lighting in all hallways.  Yes  Difficulty hearing.  Yes  Patient counseled about all safety risks that were identified.    Functional Assessment ADLs  Are there any barriers preventing you from cooking for yourself or meeting nutritional needs?  No.    Are there any barriers preventing you from driving safely or obtaining transportation?  No.    Are there any barriers preventing you from using a telephone or calling for help?  No.    Are there any barriers preventing you from shopping?  No.    Are there any barriers preventing you from taking care of your own finances?  No.    Are there any barriers preventing you from managing your medications?  No.    Are there any barriers preventing you from showering, bathing or dressing yourself?  No.    Are you currently engaging in any exercise or physical activity?  No.     What is your perception of your health?  Excellent.    Health Maintenance Summary          Overdue -  HEPATITIS C SCREENING (Once) Overdue - never done    No completion history exists for this topic.          Overdue - IMM DTaP/Tdap/Td Vaccine (1 - Tdap) Overdue - never done    No completion history exists for this topic.          Overdue - IMM ZOSTER VACCINES (1 of 2) Overdue - never done    No completion history exists for this topic.          Overdue - Annual Wellness Visit (Every 366 Days) Overdue since 2/5/2020 02/04/2019  Visit Dx: Medicare annual wellness visit, subsequent    02/04/2019  Subsequent Annual Wellness Visit - Includes PPPS ()    06/13/2013  Done          COLORECTAL CANCER SCREENING (COLONOSCOPY - Every 10 Years) Tentatively due on 11/26/2029 11/26/2019  REFERRAL TO GI FOR COLONOSCOPY    02/12/2014  AMB REFERRAL TO GI FOR COLONOSCOPY          IMM PNEUMOCOCCAL VACCINE: 65+ Years (Series Information) Completed    01/04/2018  Imm Admin: Pneumococcal polysaccharide vaccine (PPSV-23)    02/24/2016  Imm Admin: Pneumococcal Conjugate Vaccine (Prevnar/PCV-13)          IMM INFLUENZA (Series Information) Completed    10/21/2021  Imm Admin: Influenza Vaccine Adult HD    02/04/2019  Imm Admin: Influenza Vaccine Adult HD    11/11/2016  Imm Admin: Influenza Vaccine Adult HD          COVID-19 Vaccine (Series Information) Completed    11/15/2021  Imm Admin: PFIZER PURPLE CAP SARS-COV-2 VACCINATION (12+)    02/04/2021  Imm Admin: Pfizer SARS-CoV-2 Vaccine    01/17/2021  Imm Admin: Pfizer SARS-CoV-2 Vaccine          IMM HEP B VACCINE (Series Information) Aged Out    No completion history exists for this topic.          IMM MENINGOCOCCAL VACCINE (MCV4) (Series Information) Aged Out    No completion history exists for this topic.                Patient Care Team:  Marcial Mcfarlane M.D. as PCP - General (Internal Medicine)    Social History     Tobacco Use   • Smoking status: Never Smoker   • Smokeless tobacco: Never Used   Vaping Use   • Vaping Use: Never used   Substance Use Topics   • Alcohol use: Yes     " Alcohol/week: 0.0 oz     Comment: Occasional wine    • Drug use: No     Family History   Problem Relation Age of Onset   • Cancer Father      He  has a past medical history of Cold (3/2017), Erectile dysfunction, Heart burn, Hyperlipidemia, Indigestion, Infectious disease (3/2017), Renal disorder, and Thyroid disease.   Past Surgical History:   Procedure Laterality Date   • LIPOMA EXCISION N/A 2017    Procedure: LIPOMA EXCISION - 5CM  FROM MIDBACK/NECK;  Surgeon: David Rivas M.D.;  Location: SURGERY SAME DAY [facility];  Service:    • EYE SURGERY      as a child   • HERNIA REPAIR Right            Exam:     /68 (BP Location: Left arm, Patient Position: Sitting, BP Cuff Size: Adult)   Pulse 60   Temp 36.7 °C (98.1 °F) (Temporal)   Resp 16   Ht 1.727 m (5' 8\")   Wt 72.1 kg (159 lb)   SpO2 98%   BMI 24.18 kg/m²        Hearing good.    Dentition good  Alert, oriented in no acute distress.  Eye contact is good, speech goal directed, affect calm       Assessment and Plan. The following treatment and monitoring plan is recommended:      1. Medicare annual wellness visit, subsequent       2. Need for hepatitis C screening test     - HCV Scrn ( [id number] 1xLife); Future    3. Need for vaccination     - Zoster Vac Recomb Adjuvanted (SHINGRIX) 50 MCG/0.5ML Recon Susp; Inject 0.5 mL into the shoulder, thigh, or buttocks one time for 1 dose.  Dispense: 0.5 mL; Refill: 0    4. Ingrowing toenail     - Referral to Podiatry    5. Dyslipidemia   Under good control. Continue same regimen.  ]  - TSH; Future  - Comp Metabolic Panel; Future  - Lipid Profile; Future  - CBC WITH DIFFERENTIAL; Future  - HEMOGLOBIN A1C; Future  - VITAMIN D,25 HYDROXY; Future  - PROSTATE SPECIFIC AG DIAGNOSTIC; Future    6. Essential hypertension    Under good control. Continue same regimen.- TSH; Future  - Comp Metabolic Panel; Future  - Lipid Profile; Future  - CBC WITH DIFFERENTIAL; Future  - HEMOGLOBIN A1C; Future  - VITAMIN D,25 " HYDROXY; Future  - PROSTATE SPECIFIC AG DIAGNOSTIC; Future    7. Vitamin D deficiency    Under good control. Continue same regimen.- VITAMIN D,25 HYDROXY; Future    8. IFG (impaired fasting glucose)    Under good control. Continue same regimen.- HEMOGLOBIN A1C; Future    9. Screening for prostate cancer     - PROSTATE SPECIFIC AG DIAGNOSTIC; Future    10. Mixed hyperlipidemia     - atorvastatin (LIPITOR) 20 MG Tab; TAKE ONE TABLET BY MOUTH ONE TIME DAILY  Dispense: 90 Tablet; Refill: 1    11. Hypothyroidism due to acquired atrophy of thyroid     - levothyroxine (SYNTHROID) 125 MCG Tab; Take 1 Tablet by mouth every morning on an empty stomach.  Dispense: 90 Tablet; Refill: 1    12. Encounter for health counseling related to travel      - amoxicillin (AMOXIL) 500 MG Cap; Take 1 Capsule by mouth 3 times a day.  Dispense: 30 Capsule; Refill: 1    13. Vasculogenic erectile dysfunction, unspecified vasculogenic erectile dysfunction type         Under good control. Continue same regimen.- sildenafil citrate (VIAGRA) 100 MG tablet; Take 1 Tablet by mouth as needed for Erectile Dysfunction.  Dispense: 90 Tablet; Refill: 2          Services suggested: No services needed at this time  Health Care Screening recommendations as per orders if indicated.  Referrals offered: PT/OT/Nutrition counseling/Behavioral Health/Smoking cessation as per orders if indicated.    Discussion today about general wellness and lifestyle habits:    · Prevent falls and reduce trip hazards; Cautioned about securing or removing rugs.  · Have a working fire alarm and carbon monoxide detector;   · Engage in regular physical activity and social activities       Follow-up: No follow-ups on file.

## 2022-06-16 DIAGNOSIS — N52.9 VASCULOGENIC ERECTILE DYSFUNCTION, UNSPECIFIED VASCULOGENIC ERECTILE DYSFUNCTION TYPE: ICD-10-CM

## 2022-06-16 RX ORDER — SILDENAFIL 100 MG/1
100 TABLET, FILM COATED ORAL PRN
Qty: 90 TABLET | Refills: 2 | Status: SHIPPED | OUTPATIENT
Start: 2022-06-16 | End: 2023-05-03 | Stop reason: SDUPTHER

## 2022-07-28 ENCOUNTER — APPOINTMENT (RX ONLY)
Dept: URBAN - METROPOLITAN AREA CLINIC 6 | Facility: CLINIC | Age: 77
Setting detail: DERMATOLOGY
End: 2022-07-28

## 2022-07-28 DIAGNOSIS — D22 MELANOCYTIC NEVI: ICD-10-CM

## 2022-07-28 DIAGNOSIS — Z71.89 OTHER SPECIFIED COUNSELING: ICD-10-CM

## 2022-07-28 DIAGNOSIS — L82.0 INFLAMED SEBORRHEIC KERATOSIS: ICD-10-CM

## 2022-07-28 DIAGNOSIS — L82.1 OTHER SEBORRHEIC KERATOSIS: ICD-10-CM

## 2022-07-28 DIAGNOSIS — L81.4 OTHER MELANIN HYPERPIGMENTATION: ICD-10-CM

## 2022-07-28 DIAGNOSIS — L57.0 ACTINIC KERATOSIS: ICD-10-CM

## 2022-07-28 DIAGNOSIS — D18.0 HEMANGIOMA: ICD-10-CM

## 2022-07-28 PROBLEM — D18.01 HEMANGIOMA OF SKIN AND SUBCUTANEOUS TISSUE: Status: ACTIVE | Noted: 2022-07-28

## 2022-07-28 PROBLEM — D22.5 MELANOCYTIC NEVI OF TRUNK: Status: ACTIVE | Noted: 2022-07-28

## 2022-07-28 PROCEDURE — 17110 DESTRUCTION B9 LES UP TO 14: CPT

## 2022-07-28 PROCEDURE — ? LIQUID NITROGEN

## 2022-07-28 PROCEDURE — 99213 OFFICE O/P EST LOW 20 MIN: CPT | Mod: 25

## 2022-07-28 PROCEDURE — ? COUNSELING

## 2022-07-28 PROCEDURE — 17000 DESTRUCT PREMALG LESION: CPT | Mod: 59

## 2022-07-28 PROCEDURE — ? SUNSCREEN RECOMMENDATIONS

## 2022-07-28 PROCEDURE — 17003 DESTRUCT PREMALG LES 2-14: CPT | Mod: 59

## 2022-07-28 ASSESSMENT — LOCATION DETAILED DESCRIPTION DERM
LOCATION DETAILED: LEFT PROXIMAL POSTERIOR UPPER ARM
LOCATION DETAILED: RIGHT CENTRAL TEMPLE
LOCATION DETAILED: LEFT MEDIAL FOREHEAD
LOCATION DETAILED: RIGHT SUPERIOR PARIETAL SCALP
LOCATION DETAILED: RIGHT SUPERIOR MEDIAL UPPER BACK
LOCATION DETAILED: EPIGASTRIC SKIN
LOCATION DETAILED: LEFT MEDIAL INFERIOR CHEST
LOCATION DETAILED: XIPHOID
LOCATION DETAILED: LEFT ANTIHELIX
LOCATION DETAILED: LEFT INFERIOR VERMILION BORDER
LOCATION DETAILED: LEFT LATERAL FRONTAL SCALP
LOCATION DETAILED: RIGHT PROXIMAL POSTERIOR UPPER ARM
LOCATION DETAILED: LEFT MID-UPPER BACK
LOCATION DETAILED: LEFT CENTRAL PARIETAL SCALP
LOCATION DETAILED: PERIUMBILICAL SKIN
LOCATION DETAILED: RIGHT CENTRAL FRONTAL SCALP
LOCATION DETAILED: RIGHT INFERIOR MEDIAL UPPER BACK
LOCATION DETAILED: RIGHT SUPERIOR FOREHEAD
LOCATION DETAILED: NASAL SUPRATIP

## 2022-07-28 ASSESSMENT — LOCATION SIMPLE DESCRIPTION DERM
LOCATION SIMPLE: LEFT UPPER ARM
LOCATION SIMPLE: LEFT LOWER LIP
LOCATION SIMPLE: RIGHT UPPER BACK
LOCATION SIMPLE: LEFT SCALP
LOCATION SIMPLE: ABDOMEN
LOCATION SIMPLE: RIGHT TEMPLE
LOCATION SIMPLE: SCALP
LOCATION SIMPLE: LEFT FOREHEAD
LOCATION SIMPLE: LEFT UPPER BACK
LOCATION SIMPLE: LEFT EAR
LOCATION SIMPLE: CHEST
LOCATION SIMPLE: NOSE
LOCATION SIMPLE: RIGHT FOREHEAD
LOCATION SIMPLE: RIGHT UPPER ARM
LOCATION SIMPLE: RIGHT SCALP

## 2022-07-28 ASSESSMENT — LOCATION ZONE DERM
LOCATION ZONE: NOSE
LOCATION ZONE: EAR
LOCATION ZONE: TRUNK
LOCATION ZONE: LIP
LOCATION ZONE: SCALP
LOCATION ZONE: ARM
LOCATION ZONE: FACE

## 2022-07-28 NOTE — PROCEDURE: SUNSCREEN RECOMMENDATIONS
Products Recommended: Elta MD UV Clear and Brush on Block
Detail Level: Zone
General Sunscreen Counseling: I recommended a broad spectrum sunscreen with a SPF of 30 or higher.  I explained that SPF 30 sunscreens block approximately 97 percent of the sun's harmful rays.  Sunscreens should be applied at least 15 minutes prior to expected sun exposure and then every 2 hours after that as long as sun exposure continues. If swimming or exercising sunscreen should be reapplied every 45 minutes to an hour after getting wet or sweating.  One ounce, or the equivalent of a shot glass full of sunscreen, is adequate to protect the skin not covered by a bathing suit. I also recommended a lip balm with a sunscreen as well. Sun protective clothing can be used in lieu of sunscreen but must be worn the entire time you are exposed to the sun's rays.

## 2022-07-28 NOTE — PROCEDURE: LIQUID NITROGEN
Medical Necessity Information: It is in your best interest to select a reason for this procedure from the list below. All of these items fulfill various CMS LCD requirements except the new and changing color options.
Spray Paint Technique: No
Medical Necessity Clause: This procedure was medically necessary because the lesions that were treated were:
Number Of Freeze-Thaw Cycles: 2 freeze-thaw cycles
Show Aperture Variable?: Yes
Consent: The patient's consent was obtained including but not limited to risks of crusting, scabbing, blistering, scarring, darker or lighter pigmentary change, recurrence, incomplete removal and infection.
Spray Paint Text: The liquid nitrogen was applied to the skin utilizing a spray paint frosting technique.
Detail Level: Detailed
Duration Of Freeze Thaw-Cycle (Seconds): 8
Post-Care Instructions: I reviewed with the patient in detail post-care instructions. Patient is to wear sunprotection, and avoid picking at any of the treated lesions. Pt may apply Vaseline to crusted or scabbing areas.
Detail Level: Zone

## 2022-07-28 NOTE — PROCEDURE: MIPS QUALITY
Quality 130: Documentation Of Current Medications In The Medical Record: Current Medications Documented
Quality 431: Preventive Care And Screening: Unhealthy Alcohol Use - Screening: Patient not identified as an unhealthy alcohol user when screened for unhealthy alcohol use using a systematic screening method
Quality 110: Preventive Care And Screening: Influenza Immunization: Influenza immunization was not ordered or administered, reason not given
Quality 111:Pneumonia Vaccination Status For Older Adults: Pneumococcal vaccine administered on or after patient’s 60th birthday and before the end of the measurement period
Detail Level: Detailed
Quality 226: Preventive Care And Screening: Tobacco Use: Screening And Cessation Intervention: Patient screened for tobacco use and is an ex/non-smoker

## 2022-07-28 NOTE — PROCEDURE: COUNSELING
Detail Level: Zone
Sunscreen Recommendations: Recommended broad spectrum inorganic or physical sunscreens primarily containing zinc oxide or titanium dioxide.

## 2022-08-08 ENCOUNTER — NON-PROVIDER VISIT (OUTPATIENT)
Dept: MEDICAL GROUP | Age: 77
End: 2022-08-08
Payer: MEDICARE

## 2022-08-08 ENCOUNTER — TELEPHONE (OUTPATIENT)
Dept: MEDICAL GROUP | Age: 77
End: 2022-08-08

## 2022-08-08 VITALS
SYSTOLIC BLOOD PRESSURE: 106 MMHG | HEART RATE: 63 BPM | OXYGEN SATURATION: 98 % | BODY MASS INDEX: 24.1 KG/M2 | TEMPERATURE: 97.3 F | HEIGHT: 68 IN | DIASTOLIC BLOOD PRESSURE: 62 MMHG | WEIGHT: 159 LBS

## 2022-08-08 DIAGNOSIS — R73.01 IMPAIRED FASTING GLUCOSE: ICD-10-CM

## 2022-08-08 DIAGNOSIS — E03.4 HYPOTHYROIDISM DUE TO ACQUIRED ATROPHY OF THYROID: ICD-10-CM

## 2022-08-08 DIAGNOSIS — E78.2 MIXED HYPERLIPIDEMIA: ICD-10-CM

## 2022-08-08 DIAGNOSIS — E55.9 VITAMIN D DEFICIENCY: ICD-10-CM

## 2022-08-08 DIAGNOSIS — R53.82 CHRONIC FATIGUE: ICD-10-CM

## 2022-08-08 ASSESSMENT — FIBROSIS 4 INDEX: FIB4 SCORE: 1.59

## 2022-08-08 NOTE — PROGRESS NOTES
"Dusty Eid is a 76 y.o. male here for a non-provider visit for vitals check    Encounter Vitals  Temperature: 36.3 °C (97.3 °F)  Temp src: Temporal  Blood Pressure : 106/62  Pulse: 63  Pulse Oximetry: 98 %  Weight: 72.1 kg (159 lb)  Height: 172.7 cm (5' 8\")  BMI (Calculated): 24.18    If abnormal, was the Registered Nurse (office provider if RN is unavailable) notified today? Not Indicated    Routed to PCP/Requested Provider? No      "

## 2022-08-09 ENCOUNTER — HOSPITAL ENCOUNTER (OUTPATIENT)
Dept: LAB | Facility: MEDICAL CENTER | Age: 77
End: 2022-08-09
Attending: INTERNAL MEDICINE
Payer: MEDICARE

## 2022-08-09 DIAGNOSIS — E55.9 VITAMIN D DEFICIENCY: ICD-10-CM

## 2022-08-09 DIAGNOSIS — E78.2 MIXED HYPERLIPIDEMIA: ICD-10-CM

## 2022-08-09 DIAGNOSIS — R53.82 CHRONIC FATIGUE: ICD-10-CM

## 2022-08-09 DIAGNOSIS — E03.4 HYPOTHYROIDISM DUE TO ACQUIRED ATROPHY OF THYROID: ICD-10-CM

## 2022-08-09 DIAGNOSIS — R73.01 IMPAIRED FASTING GLUCOSE: ICD-10-CM

## 2022-08-09 LAB
25(OH)D3 SERPL-MCNC: 36 NG/ML (ref 30–100)
ALBUMIN SERPL BCP-MCNC: 4.2 G/DL (ref 3.2–4.9)
ALBUMIN/GLOB SERPL: 1.7 G/DL
ALP SERPL-CCNC: 41 U/L (ref 30–99)
ALT SERPL-CCNC: 15 U/L (ref 2–50)
ANION GAP SERPL CALC-SCNC: 10 MMOL/L (ref 7–16)
AST SERPL-CCNC: 21 U/L (ref 12–45)
BASOPHILS # BLD AUTO: 0.8 % (ref 0–1.8)
BASOPHILS # BLD: 0.05 K/UL (ref 0–0.12)
BILIRUB SERPL-MCNC: 0.6 MG/DL (ref 0.1–1.5)
BUN SERPL-MCNC: 15 MG/DL (ref 8–22)
CALCIUM SERPL-MCNC: 9.6 MG/DL (ref 8.5–10.5)
CHLORIDE SERPL-SCNC: 106 MMOL/L (ref 96–112)
CHOLEST SERPL-MCNC: 139 MG/DL (ref 100–199)
CO2 SERPL-SCNC: 25 MMOL/L (ref 20–33)
CREAT SERPL-MCNC: 1.04 MG/DL (ref 0.5–1.4)
EOSINOPHIL # BLD AUTO: 0.24 K/UL (ref 0–0.51)
EOSINOPHIL NFR BLD: 4 % (ref 0–6.9)
ERYTHROCYTE [DISTWIDTH] IN BLOOD BY AUTOMATED COUNT: 48.4 FL (ref 35.9–50)
ERYTHROCYTE [SEDIMENTATION RATE] IN BLOOD BY WESTERGREN METHOD: 6 MM/HOUR (ref 0–20)
EST. AVERAGE GLUCOSE BLD GHB EST-MCNC: 114 MG/DL
FASTING STATUS PATIENT QL REPORTED: NORMAL
FERRITIN SERPL-MCNC: 140 NG/ML (ref 22–322)
FOLATE SERPL-MCNC: 8.8 NG/ML
GFR SERPLBLD CREATININE-BSD FMLA CKD-EPI: 74 ML/MIN/1.73 M 2
GLOBULIN SER CALC-MCNC: 2.5 G/DL (ref 1.9–3.5)
GLUCOSE SERPL-MCNC: 101 MG/DL (ref 65–99)
HBA1C MFR BLD: 5.6 % (ref 4–5.6)
HCT VFR BLD AUTO: 42.8 % (ref 42–52)
HDLC SERPL-MCNC: 46 MG/DL
HGB BLD-MCNC: 14.6 G/DL (ref 14–18)
IMM GRANULOCYTES # BLD AUTO: 0.02 K/UL (ref 0–0.11)
IMM GRANULOCYTES NFR BLD AUTO: 0.3 % (ref 0–0.9)
IRON SATN MFR SERPL: 32 % (ref 15–55)
IRON SERPL-MCNC: 100 UG/DL (ref 50–180)
LDLC SERPL CALC-MCNC: 80 MG/DL
LYMPHOCYTES # BLD AUTO: 1.95 K/UL (ref 1–4.8)
LYMPHOCYTES NFR BLD: 32.2 % (ref 22–41)
MCH RBC QN AUTO: 32.3 PG (ref 27–33)
MCHC RBC AUTO-ENTMCNC: 34.1 G/DL (ref 33.7–35.3)
MCV RBC AUTO: 94.7 FL (ref 81.4–97.8)
MONOCYTES # BLD AUTO: 0.7 K/UL (ref 0–0.85)
MONOCYTES NFR BLD AUTO: 11.6 % (ref 0–13.4)
NEUTROPHILS # BLD AUTO: 3.09 K/UL (ref 1.82–7.42)
NEUTROPHILS NFR BLD: 51.1 % (ref 44–72)
NRBC # BLD AUTO: 0 K/UL
NRBC BLD-RTO: 0 /100 WBC
PLATELET # BLD AUTO: 271 K/UL (ref 164–446)
PMV BLD AUTO: 10.2 FL (ref 9–12.9)
POTASSIUM SERPL-SCNC: 4 MMOL/L (ref 3.6–5.5)
PROT SERPL-MCNC: 6.7 G/DL (ref 6–8.2)
RBC # BLD AUTO: 4.52 M/UL (ref 4.7–6.1)
SODIUM SERPL-SCNC: 141 MMOL/L (ref 135–145)
T4 FREE SERPL-MCNC: 1.59 NG/DL (ref 0.93–1.7)
TIBC SERPL-MCNC: 309 UG/DL (ref 250–450)
TRIGL SERPL-MCNC: 63 MG/DL (ref 0–149)
TSH SERPL DL<=0.005 MIU/L-ACNC: 0.66 UIU/ML (ref 0.38–5.33)
UIBC SERPL-MCNC: 209 UG/DL (ref 110–370)
VIT B12 SERPL-MCNC: 2321 PG/ML (ref 211–911)
WBC # BLD AUTO: 6.1 K/UL (ref 4.8–10.8)

## 2022-08-09 PROCEDURE — 85652 RBC SED RATE AUTOMATED: CPT

## 2022-08-09 PROCEDURE — 36415 COLL VENOUS BLD VENIPUNCTURE: CPT

## 2022-08-09 PROCEDURE — 82746 ASSAY OF FOLIC ACID SERUM: CPT

## 2022-08-09 PROCEDURE — 82728 ASSAY OF FERRITIN: CPT | Mod: GA

## 2022-08-09 PROCEDURE — 84443 ASSAY THYROID STIM HORMONE: CPT

## 2022-08-09 PROCEDURE — 84439 ASSAY OF FREE THYROXINE: CPT

## 2022-08-09 PROCEDURE — 82306 VITAMIN D 25 HYDROXY: CPT

## 2022-08-09 PROCEDURE — 85025 COMPLETE CBC W/AUTO DIFF WBC: CPT

## 2022-08-09 PROCEDURE — 80061 LIPID PANEL: CPT

## 2022-08-09 PROCEDURE — 83550 IRON BINDING TEST: CPT | Mod: GA

## 2022-08-09 PROCEDURE — 82607 VITAMIN B-12: CPT

## 2022-08-09 PROCEDURE — 80053 COMPREHEN METABOLIC PANEL: CPT

## 2022-08-09 PROCEDURE — 83540 ASSAY OF IRON: CPT | Mod: GA

## 2022-08-09 PROCEDURE — 83036 HEMOGLOBIN GLYCOSYLATED A1C: CPT | Mod: GA

## 2022-08-17 ENCOUNTER — OFFICE VISIT (OUTPATIENT)
Dept: MEDICAL GROUP | Age: 77
End: 2022-08-17
Payer: MEDICARE

## 2022-08-17 VITALS
BODY MASS INDEX: 23.95 KG/M2 | SYSTOLIC BLOOD PRESSURE: 104 MMHG | RESPIRATION RATE: 16 BRPM | OXYGEN SATURATION: 99 % | HEART RATE: 60 BPM | DIASTOLIC BLOOD PRESSURE: 62 MMHG | HEIGHT: 68 IN | TEMPERATURE: 98.3 F | WEIGHT: 158 LBS

## 2022-08-17 DIAGNOSIS — R73.01 IFG (IMPAIRED FASTING GLUCOSE): ICD-10-CM

## 2022-08-17 DIAGNOSIS — R53.1 WEAKNESS WITH DIZZINESS: ICD-10-CM

## 2022-08-17 DIAGNOSIS — R06.09 DOE (DYSPNEA ON EXERTION): ICD-10-CM

## 2022-08-17 DIAGNOSIS — R42 WEAKNESS WITH DIZZINESS: ICD-10-CM

## 2022-08-17 DIAGNOSIS — E78.5 DYSLIPIDEMIA: ICD-10-CM

## 2022-08-17 DIAGNOSIS — R00.1 SINUS BRADYCARDIA: ICD-10-CM

## 2022-08-17 DIAGNOSIS — E03.4 HYPOTHYROIDISM DUE TO ACQUIRED ATROPHY OF THYROID: ICD-10-CM

## 2022-08-17 DIAGNOSIS — R07.9 EXERTIONAL CHEST PAIN: ICD-10-CM

## 2022-08-17 DIAGNOSIS — E55.9 VITAMIN D DEFICIENCY: ICD-10-CM

## 2022-08-17 DIAGNOSIS — I20.0 UNSTABLE ANGINA (HCC): ICD-10-CM

## 2022-08-17 DIAGNOSIS — R20.2 TINGLING OF BOTH UPPER EXTREMITIES: ICD-10-CM

## 2022-08-17 DIAGNOSIS — I10 ESSENTIAL HYPERTENSION: ICD-10-CM

## 2022-08-17 DIAGNOSIS — E53.8 VITAMIN B 12 DEFICIENCY: ICD-10-CM

## 2022-08-17 PROCEDURE — 99215 OFFICE O/P EST HI 40 MIN: CPT | Performed by: INTERNAL MEDICINE

## 2022-08-17 PROCEDURE — 99999 EKG: CPT | Performed by: INTERNAL MEDICINE

## 2022-08-17 PROCEDURE — 93000 ELECTROCARDIOGRAM COMPLETE: CPT | Performed by: INTERNAL MEDICINE

## 2022-08-17 ASSESSMENT — ENCOUNTER SYMPTOMS
WEAKNESS: 1
DIZZINESS: 1
EYES NEGATIVE: 1
MUSCULOSKELETAL NEGATIVE: 1
GASTROINTESTINAL NEGATIVE: 1
SHORTNESS OF BREATH: 1
PSYCHIATRIC NEGATIVE: 1
TINGLING: 1

## 2022-08-17 ASSESSMENT — FIBROSIS 4 INDEX: FIB4 SCORE: 1.52

## 2022-08-17 NOTE — PROGRESS NOTES
Subjective     Dusty Eid is a 76 y.o. male who presents with Follow-Up (Review labs )  Patient is here for follow-up of a 1 to 2-day history of exertional chest pressure lightheadedness and tingling in both extremities which occurred 1 to 2 weeks ago and has not recurred since then.  He is curtailed his physical activity.  He has a prior history of chest pain several years ago that prompted an angiogram he said he had 30 and 40% obstructions in 2 separate coronary arteries.  This was treated medically.  He cannot recall if the symptoms he had at that time are similar what he has now.  During the spells he also felt weak, but had no nausea vomiting back pain or pain in his extremities.  And no jaw pain.  He is advised to get blood work done prior to this visit and follow-up with me for further management of the symptoms.  Patient says he is generally very active and walks over thousand steps a day according to his pedometer.  Not limited by back pain or arthritis, but its not clear that he would be able to perform a treadmill stress test to further evaluate cardiac disease.    Reviewed the labs done in the past week was unremarkable, except for borderline elevated glucose and A1c as noted below:  Hospital Outpatient Visit on 08/09/2022   Component Date Value    25-Hydroxy   Vitamin D 25 08/09/2022 36     Ferritin 08/09/2022 140.0     Iron 08/09/2022 100     Total Iron Binding 08/09/2022 309     Unsat Iron Binding 08/09/2022 209     % Saturation 08/09/2022 32     Folate -Folic Acid 08/09/2022 8.8     Vitamin B12 -True Cobala* 08/09/2022 2321 (A)    Glycohemoglobin 08/09/2022 5.6     Est Avg Glucose 08/09/2022 114     Sed Rate Westergren 08/09/2022 6     WBC 08/09/2022 6.1     RBC 08/09/2022 4.52 (A)    Hemoglobin 08/09/2022 14.6     Hematocrit 08/09/2022 42.8     MCV 08/09/2022 94.7     MCH 08/09/2022 32.3     MCHC 08/09/2022 34.1     RDW 08/09/2022 48.4     Platelet Count 08/09/2022 271     MPV 08/09/2022  10.2     Neutrophils-Polys 08/09/2022 51.10     Lymphocytes 08/09/2022 32.20     Monocytes 08/09/2022 11.60     Eosinophils 08/09/2022 4.00     Basophils 08/09/2022 0.80     Immature Granulocytes 08/09/2022 0.30     Nucleated RBC 08/09/2022 0.00     Neutrophils (Absolute) 08/09/2022 3.09     Lymphs (Absolute) 08/09/2022 1.95     Monos (Absolute) 08/09/2022 0.70     Eos (Absolute) 08/09/2022 0.24     Baso (Absolute) 08/09/2022 0.05     Immature Granulocytes (a* 08/09/2022 0.02     NRBC (Absolute) 08/09/2022 0.00     Cholesterol,Tot 08/09/2022 139     Triglycerides 08/09/2022 63     HDL 08/09/2022 46     LDL 08/09/2022 80     Sodium 08/09/2022 141     Potassium 08/09/2022 4.0     Chloride 08/09/2022 106     Co2 08/09/2022 25     Anion Gap 08/09/2022 10.0     Glucose 08/09/2022 101 (A)    Bun 08/09/2022 15     Creatinine 08/09/2022 1.04     Calcium 08/09/2022 9.6     AST(SGOT) 08/09/2022 21     ALT(SGPT) 08/09/2022 15     Alkaline Phosphatase 08/09/2022 41     Total Bilirubin 08/09/2022 0.6     Albumin 08/09/2022 4.2     Total Protein 08/09/2022 6.7     Globulin 08/09/2022 2.5     A-G Ratio 08/09/2022 1.7     Free T-4 08/09/2022 1.59     TSH 08/09/2022 0.660     Fasting Status 08/09/2022 Fasting     GFR (CKD-EPI) 08/09/2022 74       Lab Results   Component Value Date/Time    HBA1C 5.6 08/09/2022 06:45 AM    HBA1C 5.9 (H) 05/05/2022 08:38 AM     Lab Results   Component Value Date/Time    SODIUM 141 08/09/2022 06:45 AM    POTASSIUM 4.0 08/09/2022 06:45 AM    CHLORIDE 106 08/09/2022 06:45 AM    CO2 25 08/09/2022 06:45 AM    GLUCOSE 101 (H) 08/09/2022 06:45 AM    BUN 15 08/09/2022 06:45 AM    CREATININE 1.04 08/09/2022 06:45 AM    CREATININE 1.15 12/09/2010 07:25 AM    BUNCREATRAT 16 12/09/2010 07:25 AM    GLOMRATE >59 12/09/2010 07:25 AM    ALKPHOSPHAT 41 08/09/2022 06:45 AM    ASTSGOT 21 08/09/2022 06:45 AM    ALTSGPT 15 08/09/2022 06:45 AM    TBILIRUBIN 0.6 08/09/2022 06:45 AM     No results found for: INR  Lab Results    Component Value Date/Time    CHOLSTRLTOT 139 08/09/2022 06:45 AM    LDL 80 08/09/2022 06:45 AM    HDL 46 08/09/2022 06:45 AM    TRIGLYCERIDE 63 08/09/2022 06:45 AM       Lab Results   Component Value Date/Time    TESTOSTERONE 695 06/30/2014 10:29 AM     Lab Results   Component Value Date/Time    TSH 20.800 (H) 03/29/2011 11:33 AM    TSH 20.440 (H) 12/09/2010 07:25 AM     Lab Results   Component Value Date/Time    FREET4 1.59 08/09/2022 06:45 AM    FREET4 1.58 12/02/2020 11:49 AM     No results found for: URICACID  No components found for: VITB12  Lab Results   Component Value Date/Time    25HYDROXY 36 08/09/2022 06:45 AM    25HYDROXY 37 05/05/2022 08:38 AM   '  Outpatient Medications Prior to Visit   Medication Sig Dispense Refill    sildenafil citrate (VIAGRA) 100 MG tablet Take 1 Tablet by mouth as needed for Erectile Dysfunction. 90 Tablet 2    atorvastatin (LIPITOR) 20 MG Tab TAKE ONE TABLET BY MOUTH ONE TIME DAILY 90 Tablet 1    levothyroxine (SYNTHROID) 125 MCG Tab Take 1 Tablet by mouth every morning on an empty stomach. 90 Tablet 1    aspirin EC (ECOTRIN) 81 MG Tablet Delayed Response Take 1 Tab by mouth every day. 90 Tab 4    amoxicillin (AMOXIL) 500 MG Cap Take 1 Capsule by mouth 3 times a day. 30 Capsule 1     No facility-administered medications prior to visit.     Patient Active Problem List    Diagnosis Date Noted    GIVENS (dyspnea on exertion) 08/17/2022    Exertional chest pain 08/17/2022    Unstable angina (HCC) 08/17/2022    Sinus bradycardia 08/17/2022    Vitamin D deficiency 05/05/2021    Epididymal cyst- left 06/11/2019    Other male erectile dysfunction 07/09/2018    Fatty liver 05/12/2017    Encounter for Department of Transportation (DOT) examination for driving license renewal 03/29/2017    IFG (impaired fasting glucose) 03/29/2017    Mixed hyperlipidemia 11/25/2015    Hypothyroidism due to acquired atrophy of thyroid 12/29/2010               HPI    Review of Systems   Constitutional:   "Positive for malaise/fatigue.   HENT: Negative.     Eyes: Negative.    Respiratory:  Positive for shortness of breath.    Cardiovascular:  Positive for chest pain.   Gastrointestinal: Negative.    Genitourinary: Negative.    Musculoskeletal: Negative.    Skin: Negative.    Neurological:  Positive for dizziness, tingling and weakness.   Endo/Heme/Allergies: Negative.    Psychiatric/Behavioral: Negative.              Objective     /62 (BP Location: Left arm, Patient Position: Sitting, BP Cuff Size: Adult)   Pulse 60   Temp 36.8 °C (98.3 °F) (Temporal)   Resp 16   Ht 1.727 m (5' 8\")   Wt 71.7 kg (158 lb)   SpO2 99%   BMI 24.02 kg/m²      Physical Exam  Constitutional:       General: He is not in acute distress.     Appearance: He is well-developed. He is not diaphoretic.   HENT:      Head: Normocephalic and atraumatic.      Right Ear: External ear normal.      Left Ear: External ear normal.      Nose: Nose normal.      Mouth/Throat:      Pharynx: No oropharyngeal exudate.   Eyes:      General: No scleral icterus.        Right eye: No discharge.         Left eye: No discharge.      Conjunctiva/sclera: Conjunctivae normal.      Pupils: Pupils are equal, round, and reactive to light.   Neck:      Thyroid: No thyromegaly.      Vascular: No JVD.      Trachea: No tracheal deviation.   Cardiovascular:      Rate and Rhythm: Regular rhythm. Bradycardia present.      Heart sounds: Normal heart sounds. No murmur heard.    No friction rub. No gallop.   Pulmonary:      Effort: Pulmonary effort is normal. No respiratory distress.      Breath sounds: Normal breath sounds. No stridor. No wheezing or rales.   Chest:      Chest wall: No tenderness.   Abdominal:      General: Bowel sounds are normal. There is no distension.      Palpations: Abdomen is soft. There is no mass.      Tenderness: There is no abdominal tenderness. There is no guarding or rebound.   Musculoskeletal:         General: No tenderness. Normal range of " motion.      Cervical back: Normal range of motion and neck supple.   Lymphadenopathy:      Cervical: No cervical adenopathy.   Skin:     General: Skin is warm and dry.      Coloration: Skin is not pale.      Findings: No erythema or rash.   Neurological:      Mental Status: He is alert and oriented to person, place, and time.      Motor: No abnormal muscle tone.      Coordination: Coordination normal.      Deep Tendon Reflexes: Reflexes are normal and symmetric. Reflexes normal.   Psychiatric:         Behavior: Behavior normal.         Thought Content: Thought content normal.         Judgment: Judgment normal.     EKG done in the office and reviewed by me shows sinus bradycardia at 48 but otherwise unremarkable.                      Assessment & Plan        1. Unstable angina (HCC)-the mckenna patient had 1 to 2 weeks ago is suspicious for possible cardiac etiology with chest pressure and tingling in both extremities and lightheadedness and weakness that lasted for a few hours following exertion.  This patient has known mild coronary disease treated medically as stated above by prior angiogram, it is imperative patient's have a full cardiac work-up done.  Referral therefore placed and testing ordered to expedite the process.    The meantime patient advised to take 81 mg aspirin daily and continue his other medications unchanged and follow-up with me in 1 month to review test results.  Sooner if symptoms recur.     - REFERRAL TO CARDIOLOGY  - NM-CARDIAC STRESS TEST; Future  - EC-ECHOCARDIOGRAM COMPLETE W/O CONT; Future    2. Exertional chest pain  See above.  EKG shows no ischemic changes.  Just sinus bradycardia which would further evaluate with 14-day cardiac event monitor, Zio patch or bio tel  - EKG  - NM-CARDIAC STRESS TEST; Future  - EC-ECHOCARDIOGRAM COMPLETE W/O CONT; Future    3. Tingling of both upper extremities-x1 to 2 hours, 1 week ago, resolved  As above-no recurrence.    4. Weakness with dizziness-with  exertion  As above.  No recurrence.    5. Sinus bradycardia  Ongoing problem probably not significant but cannot know for sure with brief episode of dizziness and weakness that might indicate a brief episode of severe bradycardia requiring pacemaker.  Therefore event monitor ordered.  Cardiac    6. Hypothyroidism due to acquired atrophy of thyroid  Good control continue current regimen  - TSH; Future    7. Vitamin D deficiency   Good control continue current regimen  - VITAMIN D,25 HYDROXY; Future    8. IFG (impaired fasting glucose)   Good control continue current regimen.  This is only borderline.   and A1c 5.6.  Continue Mediterranean diet.  Patient will hold off on any exertional exercise at this time due to above issues, and until CAD can be definitively ruled out or need for pacemaker ruled out..  - HEMOGLOBIN A1C; Future    9. Essential hypertension  Good control continue current regimen    10. Dyslipidemia    Good control continue current regimen  - TSH; Future  - Comp Metabolic Panel; Future  - Lipid Profile; Future  - CBC WITH DIFFERENTIAL; Future    11. Vitamin B 12 deficiency   Good control continue current regimen  - VITAMIN B12; Future    12. GIVENS (dyspnea on exertion)        see above.  Unclear etiology.  This has resolved but the etiology of the brief episode is remains unclear.  - EKG  - NM-CARDIAC STRESS TEST; Future  - EC-ECHOCARDIOGRAM COMPLETE W/O CONT; Future

## 2022-08-22 ENCOUNTER — OFFICE VISIT (OUTPATIENT)
Dept: CARDIOLOGY | Facility: MEDICAL CENTER | Age: 77
End: 2022-08-22
Attending: INTERNAL MEDICINE
Payer: MEDICARE

## 2022-08-22 VITALS
RESPIRATION RATE: 16 BRPM | OXYGEN SATURATION: 98 % | WEIGHT: 158 LBS | DIASTOLIC BLOOD PRESSURE: 70 MMHG | SYSTOLIC BLOOD PRESSURE: 124 MMHG | BODY MASS INDEX: 23.95 KG/M2 | HEIGHT: 68 IN | HEART RATE: 50 BPM

## 2022-08-22 DIAGNOSIS — R00.1 SINUS BRADYCARDIA: ICD-10-CM

## 2022-08-22 DIAGNOSIS — R42 WEAKNESS WITH DIZZINESS: ICD-10-CM

## 2022-08-22 DIAGNOSIS — R00.1 BRADYCARDIA: ICD-10-CM

## 2022-08-22 DIAGNOSIS — R07.2 PRECORDIAL PAIN: ICD-10-CM

## 2022-08-22 DIAGNOSIS — R53.1 WEAKNESS WITH DIZZINESS: ICD-10-CM

## 2022-08-22 PROCEDURE — 99204 OFFICE O/P NEW MOD 45 MIN: CPT | Performed by: INTERNAL MEDICINE

## 2022-08-22 ASSESSMENT — FIBROSIS 4 INDEX: FIB4 SCORE: 1.52

## 2022-08-22 NOTE — PROGRESS NOTES
"CARDIOLOGY NEW PATIENT CONSULTATION    PCP: Marcial Mcfarlane M.D.    1. Precordial pain    2. Bradycardia        Dusty Eid is experiencing infrequent orthostasis which could be related to bradycardia as well as possible cardiac chest discomfort.  I advised to complete testing as already arranged by Dr. Mcfarlane including the echocardiogram MPI and Holter monitor.  I will follow-up with him on the results.    Follow up: to be determined after testing is complete    Chief Complaint   Patient presents with    Angina (Unstable)    Hyperlipidemia     NP Dx: Mixed hyperlipidemia    Bradycardia     NP Dx: Sinus bradycardia       History: Dusty Eid is a 76 y.o. male with history of dyslipidemia presenting for assessment of chest symptoms and bradycardia.  He reports frequently noting slow heart rate and has also been experiencing dizziness upon standing.  He generally feels well when exerting himself but does at times feel discomfort in the chest.  Previously he was very active, including going on bicycle rides from [place] to [place].  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  When working he ran very high cholesterol as he was not able to care for himself.      ROS:   10 point review systems is otherwise negative except as per the HPI    PE:  /70 (BP Location: Left arm, Patient Position: Sitting, BP Cuff Size: Adult)   Pulse (!) 50   Resp 16   Ht 1.727 m (5' 8\")   Wt 71.7 kg (158 lb)   SpO2 98%   BMI 24.02 kg/m²   Gen: no acute distress  HEENT: Symmetric face. Anicteric sclerae. Moist mucus membranes  NECK: No JVD. No lymphadenopathy  CARDIAC: Regular, Normal S1, S2, No murmur  VASCULATURE: carotids are normal bilaterally without bruit  RESP: Clear to auscultation bilaterally  ABD: Soft, non-tender, non-distended  EXT: No edema, no clubbing or cyanosis  SKIN: Warm and dry  NEURO: No gross deficits  PSYCH: Appropriate affect, participates in conversation    The 10-year " ASCVD risk score (Denver UGO Jr., et al., 2013) is: 23.2%    Past Medical History:   Diagnosis Date    Cold 3/2017    has not resolved/cxr ordered    Erectile dysfunction     Heart burn     L arm    Hyperlipidemia     Indigestion     Infectious disease 3/2017    flu    Renal disorder     kidney stones    Thyroid disease      Past Surgical History:   Procedure Laterality Date    LIPOMA EXCISION N/A 4/19/2017    Procedure: LIPOMA EXCISION - 5CM  FROM MIDBACK/NECK;  Surgeon: David Rivas M.D.;  Location: SURGERY SAME DAY [facility];  Service:     EYE SURGERY      as a child    HERNIA REPAIR Right      Allergies   Allergen Reactions    Codeine Nausea     Outpatient Encounter Medications as of 8/22/2022   Medication Sig Dispense Refill    sildenafil citrate (VIAGRA) 100 MG tablet Take 1 Tablet by mouth as needed for Erectile Dysfunction. 90 Tablet 2    atorvastatin (LIPITOR) 20 MG Tab TAKE ONE TABLET BY MOUTH ONE TIME DAILY 90 Tablet 1    levothyroxine (SYNTHROID) 125 MCG Tab Take 1 Tablet by mouth every morning on an empty stomach. 90 Tablet 1    aspirin EC (ECOTRIN) 81 MG Tablet Delayed Response Take 1 Tab by mouth every day. 90 Tab 4    [DISCONTINUED] amoxicillin (AMOXIL) 500 MG Cap Take 1 Capsule by mouth 3 times a day. (Patient not taking: Reported on 8/22/2022) 30 Capsule 1     No facility-administered encounter medications on file as of 8/22/2022.     Social History     Socioeconomic History    Marital status:      Spouse name: Not on file    Number of children: Not on file    Years of education: Not on file    Highest education level: Not on file   Occupational History    Not on file   Tobacco Use    Smoking status: Never    Smokeless tobacco: Never   Vaping Use    Vaping Use: Never used   Substance and Sexual Activity    Alcohol use: Yes     Alcohol/week: 0.0 oz     Comment: Occasional wine     Drug use: No    Sexual activity: Not Currently     Partners: Female   Other Topics Concern    Not on file    Social History Narrative    Not on file     Social Determinants of Health     Financial Resource Strain: Not on file   Food Insecurity: Not on file   Transportation Needs: Not on file   Physical Activity: Not on file   Stress: Not on file   Social Connections: Not on file   Intimate Partner Violence: Not on file   Housing Stability: Not on file     Family History   Problem Relation Age of Onset    Cancer Father          Studies  Lab Results   Component Value Date/Time    CHOLSTRLTOT 139 08/09/2022 06:45 AM    LDL 80 08/09/2022 06:45 AM    HDL 46 08/09/2022 06:45 AM    TRIGLYCERIDE 63 08/09/2022 06:45 AM       Lab Results   Component Value Date/Time    SODIUM 141 08/09/2022 06:45 AM    POTASSIUM 4.0 08/09/2022 06:45 AM    CHLORIDE 106 08/09/2022 06:45 AM    CO2 25 08/09/2022 06:45 AM    GLUCOSE 101 (H) 08/09/2022 06:45 AM    BUN 15 08/09/2022 06:45 AM    CREATININE 1.04 08/09/2022 06:45 AM    CREATININE 1.15 12/09/2010 07:25 AM    BUNCREATRAT 16 12/09/2010 07:25 AM    GLOMRATE >59 12/09/2010 07:25 AM     Lab Results   Component Value Date/Time    ALKPHOSPHAT 41 08/09/2022 06:45 AM    ASTSGOT 21 08/09/2022 06:45 AM    ALTSGPT 15 08/09/2022 06:45 AM    TBILIRUBIN 0.6 08/09/2022 06:45 AM        For this encounter I reviewed the following medical records BMP, Lipid profile, and LFT

## 2022-08-29 ENCOUNTER — NON-PROVIDER VISIT (OUTPATIENT)
Dept: CARDIOLOGY | Facility: MEDICAL CENTER | Age: 77
End: 2022-08-29
Attending: INTERNAL MEDICINE
Payer: MEDICARE

## 2022-08-29 DIAGNOSIS — I49.3 VENTRICULAR ECTOPY: ICD-10-CM

## 2022-08-29 NOTE — PROGRESS NOTES
Patient enrolled in the 14 day Zio XT Holter monitoring program per Liam Lopez MD.   >In-Clinic hook-up, serial # [id number]..   >Currently pending EOS.

## 2022-09-15 ENCOUNTER — TELEPHONE (OUTPATIENT)
Dept: CARDIOLOGY | Facility: MEDICAL CENTER | Age: 77
End: 2022-09-15
Payer: MEDICARE

## 2022-09-15 NOTE — TELEPHONE ENCOUNTER
Received Zio results, ordered by PCP Dr. Marcial Mcfarlane. Patient currently established with [facility].              To [facility]: Please read Zio monitor

## 2022-09-16 PROCEDURE — 93248 EXT ECG>7D<15D REV&INTERPJ: CPT | Performed by: INTERNAL MEDICINE

## 2022-09-16 PROCEDURE — 93246 EXT ECG>7D<15D RECORDING: CPT | Performed by: INTERNAL MEDICINE

## 2022-09-17 NOTE — TELEPHONE ENCOUNTER
Message  Received: Today  Liam Lopez M.D.  Itzel Michelle R.N.  Caller: Unspecified (Yesterday,  3:03 PM)  Frequent PVCs during the heart monitor-there are no actual significant episodes of slow heart rate, this type of arrhythmia frequently confuses plethysmography devices into believing there is a slow heart rate..  I suggest completing the other cardiac testing and following up with myself afterwards.     Thanks   [facility]   _________________________________________________________    MyChart message sent to patient with update.

## 2022-09-20 ENCOUNTER — OFFICE VISIT (OUTPATIENT)
Dept: CARDIOLOGY | Facility: MEDICAL CENTER | Age: 77
End: 2022-09-20
Payer: MEDICARE

## 2022-09-20 VITALS
HEIGHT: 68 IN | WEIGHT: 153 LBS | HEART RATE: 49 BPM | RESPIRATION RATE: 14 BRPM | SYSTOLIC BLOOD PRESSURE: 132 MMHG | BODY MASS INDEX: 23.19 KG/M2 | DIASTOLIC BLOOD PRESSURE: 64 MMHG | OXYGEN SATURATION: 98 %

## 2022-09-20 DIAGNOSIS — R07.9 EXERTIONAL CHEST PAIN: ICD-10-CM

## 2022-09-20 DIAGNOSIS — R94.31 ABNORMAL ELECTROCARDIOGRAM (ECG) (EKG): ICD-10-CM

## 2022-09-20 DIAGNOSIS — I49.3 VENTRICULAR ECTOPY: ICD-10-CM

## 2022-09-20 PROCEDURE — 99214 OFFICE O/P EST MOD 30 MIN: CPT | Performed by: NURSE PRACTITIONER

## 2022-09-20 RX ORDER — METOPROLOL SUCCINATE 25 MG/1
25 TABLET, EXTENDED RELEASE ORAL DAILY
Qty: 30 TABLET | Refills: 3 | Status: SHIPPED
Start: 2022-09-20 | End: 2022-09-20

## 2022-09-20 RX ORDER — AMLODIPINE BESYLATE 5 MG/1
5 TABLET ORAL DAILY
Qty: 30 TABLET | Refills: 3 | Status: SHIPPED
Start: 2022-09-20 | End: 2022-11-10

## 2022-09-20 ASSESSMENT — FIBROSIS 4 INDEX: FIB4 SCORE: 1.52

## 2022-10-26 ENCOUNTER — HOSPITAL ENCOUNTER (OUTPATIENT)
Dept: RADIOLOGY | Facility: MEDICAL CENTER | Age: 77
End: 2022-10-26
Attending: NURSE PRACTITIONER
Payer: MEDICARE

## 2022-10-26 DIAGNOSIS — R07.9 EXERTIONAL CHEST PAIN: ICD-10-CM

## 2022-10-26 DIAGNOSIS — R94.31 ABNORMAL ELECTROCARDIOGRAM (ECG) (EKG): ICD-10-CM

## 2022-10-27 ENCOUNTER — TELEPHONE (OUTPATIENT)
Dept: CARDIOLOGY | Facility: MEDICAL CENTER | Age: 77
End: 2022-10-27
Payer: MEDICARE

## 2022-11-04 ENCOUNTER — APPOINTMENT (OUTPATIENT)
Dept: CARDIOLOGY | Facility: MEDICAL CENTER | Age: 77
End: 2022-11-04
Attending: INTERNAL MEDICINE
Payer: MEDICARE

## 2022-11-08 ENCOUNTER — PATIENT MESSAGE (OUTPATIENT)
Dept: HEALTH INFORMATION MANAGEMENT | Facility: OTHER | Age: 77
End: 2022-11-08

## 2022-11-09 ENCOUNTER — HOSPITAL ENCOUNTER (OUTPATIENT)
Dept: RADIOLOGY | Facility: MEDICAL CENTER | Age: 77
End: 2022-11-09
Attending: NURSE PRACTITIONER
Payer: MEDICARE

## 2022-11-09 PROCEDURE — 78431 MYOCRD IMG PET RST&STRS CT: CPT | Mod: 26 | Performed by: INTERNAL MEDICINE

## 2022-11-09 PROCEDURE — 93018 CV STRESS TEST I&R ONLY: CPT | Performed by: INTERNAL MEDICINE

## 2022-11-09 PROCEDURE — 78431 MYOCRD IMG PET RST&STRS CT: CPT

## 2022-11-10 ENCOUNTER — OFFICE VISIT (OUTPATIENT)
Dept: CARDIOLOGY | Facility: MEDICAL CENTER | Age: 77
End: 2022-11-10
Payer: MEDICARE

## 2022-11-10 VITALS
BODY MASS INDEX: 24.25 KG/M2 | RESPIRATION RATE: 18 BRPM | SYSTOLIC BLOOD PRESSURE: 134 MMHG | HEIGHT: 68 IN | DIASTOLIC BLOOD PRESSURE: 70 MMHG | HEART RATE: 62 BPM | WEIGHT: 160 LBS | OXYGEN SATURATION: 100 %

## 2022-11-10 DIAGNOSIS — Z87.898 H/O EXERTIONAL CHEST PAIN: ICD-10-CM

## 2022-11-10 DIAGNOSIS — I25.10 CORONARY ARTERY DISEASE INVOLVING NATIVE CORONARY ARTERY OF NATIVE HEART WITHOUT ANGINA PECTORIS: ICD-10-CM

## 2022-11-10 DIAGNOSIS — I49.3 PVC (PREMATURE VENTRICULAR CONTRACTION): ICD-10-CM

## 2022-11-10 DIAGNOSIS — R06.09 DOE (DYSPNEA ON EXERTION): ICD-10-CM

## 2022-11-10 DIAGNOSIS — E78.2 MIXED HYPERLIPIDEMIA: ICD-10-CM

## 2022-11-10 PROBLEM — I20.0 UNSTABLE ANGINA (HCC): Status: RESOLVED | Noted: 2022-08-17 | Resolved: 2022-11-10

## 2022-11-10 PROCEDURE — 99214 OFFICE O/P EST MOD 30 MIN: CPT | Performed by: NURSE PRACTITIONER

## 2022-11-10 ASSESSMENT — FIBROSIS 4 INDEX: FIB4 SCORE: 1.52

## 2022-11-10 NOTE — PATIENT INSTRUCTIONS
Checking Blood Pressure:  -Blood pressure cuff, spend in the $40-65, with good return policy  -It should be automatic, upper arm, measure your arm to get the correct size, probably adult Large but your arm should be under 16.5 cm. If you need an XL cuff, you will have to have it special ordered from a pharmacy or durable medical equipment company.  -Put the cuff in place, rest arm on table near height of your heart, sit quietly for 5 min, legs uncrossed, with back support, then take your blood pressure, write it down, keep a log  -Check no more than 1 time day, maybe 4-5 times per week, try different times of day.  -Can bring your cuff to at least one appointment where it can be calibrated to a manual cuff if you are concerned.  -Goal blood pressure is at least under 130/80, ideally under 120/80.  If you think your BP is overall too high, let us know in the office, we can adjust medications, can use [employer] or call the [facility] office: [phone].    Salt=sodium=sea salt, guidelines say stay under 2,500 mg daily but I ask for under 4,000 mg daily.  Get salt smart, start looking at labels, count it up.  Salt is hidden in everything, salad dressing, sauces, cheese, most canned food, any processed meat.

## 2022-11-10 NOTE — PROGRESS NOTES
Cardiology Clinic Follow-up Note    Date of note:    11/10/2022  Primary Care Provider: Marcial Mcfarlane M.D.    Name:             Dusty Eid  YOB: 1945  MRN:               [id number]    CC: Bradycardia, ventricular ectopy, shortness of breath    Primary Cardiologist: Dr Lopez     Patient HPI:   Dusty Eid is a 76 y.o. male with current medical problems including dyslipidemia, bradycardia, PVC's, and history of CAD    Interim History:  Patient presents today to F/U on his PET scan completed yesterday, with normal results, no ischemia noted .    He is feeling fine, like he could walk 20 mins and feel great after.  BP usually 110/70's, his whole life, slightly high today.  At last visit we started him on amlodipine 5mg as antianginals. He only took it for a few days, felt fine off of it, symptoms dissipated.  He believes he caught COVID or he either had a reaction to the vaccines that him the cardiac symptoms he was feeling in August/    He denies palpitations, chest pain, shortness of breath, dyspnea on exertion, dizziness or syncopal episodes, orthopnea, PND, lower extremity swelling, and recent weight gain.     Per my last office visit note on 9/2722  Mr. Eid was last seen in this cardiology office by Dr Lopez on 8/22/22.  At that time he was awaiting test results to be completed, echocardiogram, MPI, and Holter monitor. Holter monitor showed 19% predominant ventricular ectopy, with bigeminy and trigeminy present.  Patient has not yet obtained his NM cardiac stress test or echocardiogram.  He does remember undergoing cardiac catheterization in the 90's.  Was told he had 20-30% blockage and some of his coronary arteries.    He denies palpitations, chest pain, dyspnea on exertion, syncopal episodes, orthopnea, PND, lower extremity swelling, and recent weight gain. Positive symproms of SOB, chest discomfort with going up the stairs, and dizziness at times when doing something  physical.     Patient endorses medication compliance.     Cardiovascular Risk Factors:  1. Smoking status: no  2. Type II Diabetes Mellitus: no   Lab Results   Component Value Date/Time    HBA1C 5.6 08/09/2022 06:45 AM    HBA1C 5.9 (H) 05/05/2022 08:38 AM     3. Hypertension: no  4. Dyslipidemia: yes, on lipitor  Cholesterol,Tot   Date Value Ref Range Status   08/09/2022 139 100 - 199 mg/dL Final     LDL   Date Value Ref Range Status   08/09/2022 80 <100 mg/dL Final     HDL   Date Value Ref Range Status   08/09/2022 46 >=40 mg/dL Final     Triglycerides   Date Value Ref Range Status   08/09/2022 63 0 - 149 mg/dL Final     5. Family history of early Coronary Artery Disease in a first degree relative (Male less than 55 years of age; Female less than 65 years of age):   6.  Obesity and/or Metabolic Syndrome: BMI 23.2  7. Sedentary lifestyle: Yes, walking [phone] steps/day    Review of systems:  All others systems reviewed and negative except for what is outlined in the above HPI    Past Medical History:   Diagnosis Date    Cold 3/2017    has not resolved/cxr ordered    Erectile dysfunction     Heart burn     L arm    Hyperlipidemia     Indigestion     Infectious disease 3/2017    flu    Renal disorder     kidney stones    Thyroid disease      Past Surgical History:   Procedure Laterality Date    LIPOMA EXCISION N/A 4/19/2017    Procedure: LIPOMA EXCISION - 5CM  FROM MIDBACK/NECK;  Surgeon: David Rivas M.D.;  Location: SURGERY SAME DAY [facility];  Service:     EYE SURGERY      as a child    HERNIA REPAIR Right      Family History   Problem Relation Age of Onset    Cancer Father      Social History     Socioeconomic History    Marital status:      Spouse name: Not on file    Number of children: Not on file    Years of education: Not on file    Highest education level: Not on file   Occupational History    Not on file   Tobacco Use    Smoking status: Never    Smokeless tobacco: Never   Vaping Use     "Vaping Use: Never used   Substance and Sexual Activity    Alcohol use: Not Currently     Alcohol/week: 1.2 oz     Types: 2 Glasses of wine per week     Comment: Occasional wine     Drug use: No    Sexual activity: Not Currently     Partners: Female   Other Topics Concern    Not on file   Social History Narrative    Not on file     Social Determinants of Health     Financial Resource Strain: Not on file   Food Insecurity: Not on file   Transportation Needs: Not on file   Physical Activity: Not on file   Stress: Not on file   Social Connections: Not on file   Intimate Partner Violence: Not on file   Housing Stability: Not on file     Allergies   Allergen Reactions    Codeine Nausea     Current Outpatient Medications   Medication Sig Dispense Refill    sildenafil citrate (VIAGRA) 100 MG tablet Take 1 Tablet by mouth as needed for Erectile Dysfunction. 90 Tablet 2    atorvastatin (LIPITOR) 20 MG Tab TAKE ONE TABLET BY MOUTH ONE TIME DAILY 90 Tablet 1    levothyroxine (SYNTHROID) 125 MCG Tab Take 1 Tablet by mouth every morning on an empty stomach. 90 Tablet 1    aspirin EC (ECOTRIN) 81 MG Tablet Delayed Response Take 1 Tab by mouth every day. 90 Tab 4     No current facility-administered medications for this visit.       Physical Exam:  Ambulatory Vitals  /70 (BP Location: Left arm, Patient Position: Sitting, BP Cuff Size: Adult)   Pulse 62   Resp 18   Ht 1.727 m (5' 8\")   Wt 72.6 kg (160 lb)   SpO2 100%    BP Readings from Last 4 Encounters:   11/10/22 134/70   09/20/22 132/64   08/22/22 124/70   08/17/22 104/62       Weight/BMI: Body mass index is 24.33 kg/m².  Wt Readings from Last 4 Encounters:   11/10/22 72.6 kg (160 lb)   09/20/22 69.4 kg (153 lb)   08/22/22 71.7 kg (158 lb)   08/17/22 71.7 kg (158 lb)     General: No apparent distress. Well nourished.   Neck: No JVD. No caroid bruits, trachea midline  Lungs: CTAB. Normal effort, without crackles/rhonchi, no wheezing  Heart: Bradycardia with " extrasystoles. Normal S1/S2, no murmur, no rub. no lower extremity edema. 2+ radial pulses, 2+ DT pulses  Ext: No clubbing or cyanosis.  Abdomen: soft, non tender, non distended, no arturo hepatomegaly.  Neurological: No focal deficits, no facial asymmetry.  Normal speech.  Psychiatric: Appropriate affect, alert and oriented x 4.   Skin: Warm and dry, no rash.    Lab Data Review:  Lab Results   Component Value Date/Time    CHOLSTRLTOT 139 2022 06:45 AM    LDL 80 2022 06:45 AM    HDL 46 2022 06:45 AM    TRIGLYCERIDE 63 2022 06:45 AM       Lab Results   Component Value Date/Time    SODIUM 141 2022 06:45 AM    POTASSIUM 4.0 2022 06:45 AM    CHLORIDE 106 2022 06:45 AM    CO2 25 2022 06:45 AM    GLUCOSE 101 (H) 2022 06:45 AM    BUN 15 2022 06:45 AM    CREATININE 1.04 2022 06:45 AM    CREATININE 1.15 2010 07:25 AM    BUNCREATRAT 16 2010 07:25 AM    GLOMRATE >59 2010 07:25 AM     Lab Results   Component Value Date/Time    ALKPHOSPHAT 41 2022 06:45 AM    ASTSGOT 21 2022 06:45 AM    ALTSGPT 15 2022 06:45 AM    TBILIRUBIN 0.6 2022 06:45 AM      Lab Results   Component Value Date/Time    WBC 6.1 2022 06:45 AM    WBC 6.7 2010 07:25 AM     Cardiac Imaging and Procedures Review:      EKG 22: My Personal interpretation reveals     Echo :  (Pending)    Cardiac Pet scan 22:   No evidence of significant jeopardized viable myocardium or prior myocardial    infarction.   Normal left ventricular size, ejection fraction, and wall motion.   Rest EF = 63%   Stress EF = 79%.   ECG INTERPRETATION   Nondiagnostic ECG portion of pharmacological stress test.    Cardiac event monitor 9/15/2022  Symptomatic 2-week cardiac rhythm monitor with symptoms corresponding to ventricular ectopy   Frequent ventricular ectopy with an overall burden of 19.3%     Assessment and Clinical Decision Makin. PVC (premature  ventricular contraction)        2. H/O exertional chest pain        3. Mixed hyperlipidemia        4. GIVENS (dyspnea on exertion)          The following treatment plan was discussed    Exertional chest pain, GIVENS  Hx of CAD  -Per patient being [facility] in the 90's showed 20-30% stenosis in coronary arteries  -Both chest pain and GIVENS have resolved  -Cardiac PET scan normal, no ischemia, no scarring  -Continue aspirin 81 mg daily and atorvastatin 20 mg daily  -Obtain echocardiogram    Abnormal cardiac event monitor  Ventricular ectopy  -Not feeling any palpitations currently   -19% burden of ventricular ectopy Per cardiac event monitor    HLD  -Continue atorvastatin 20 mg  -Yearly lipid panel, LDL goal < 100    Plan reviewed in detail with the patient, verbalizes understanding and is in agreement.  Pt is to follow up with Dr. Lopez in 6 months    Encouraged Pt to follow up with us over the phone or electronically using my [employer] as cardiac issues/concerns arise.      PLEASE NOTE: This dictation was created using voice recognition software. I have made every reasonable attempt to correct obvious errors, but I expect that there are errors of grammar and possibly content that I did not discover before finalizing the note.       BECKIE Shah.   [facility]  [phone]    Collaborating Physician: Dr Bruno

## 2022-12-11 DIAGNOSIS — E03.4 HYPOTHYROIDISM DUE TO ACQUIRED ATROPHY OF THYROID: ICD-10-CM

## 2022-12-14 RX ORDER — LEVOTHYROXINE SODIUM 0.12 MG/1
TABLET ORAL
Qty: 90 TABLET | Refills: 4 | Status: SHIPPED | OUTPATIENT
Start: 2022-12-14 | End: 2023-01-03 | Stop reason: SDUPTHER

## 2023-01-03 ENCOUNTER — OFFICE VISIT (OUTPATIENT)
Dept: MEDICAL GROUP | Age: 78
End: 2023-01-03
Payer: MEDICARE

## 2023-01-03 ENCOUNTER — HOSPITAL ENCOUNTER (OUTPATIENT)
Dept: LAB | Facility: MEDICAL CENTER | Age: 78
End: 2023-01-03
Attending: INTERNAL MEDICINE
Payer: MEDICARE

## 2023-01-03 VITALS
DIASTOLIC BLOOD PRESSURE: 70 MMHG | SYSTOLIC BLOOD PRESSURE: 110 MMHG | BODY MASS INDEX: 24.55 KG/M2 | RESPIRATION RATE: 16 BRPM | TEMPERATURE: 99.4 F | OXYGEN SATURATION: 95 % | WEIGHT: 162 LBS | HEIGHT: 68 IN | HEART RATE: 61 BPM

## 2023-01-03 DIAGNOSIS — I49.3 PVC (PREMATURE VENTRICULAR CONTRACTION): ICD-10-CM

## 2023-01-03 DIAGNOSIS — E78.5 DYSLIPIDEMIA: ICD-10-CM

## 2023-01-03 DIAGNOSIS — R73.01 IFG (IMPAIRED FASTING GLUCOSE): ICD-10-CM

## 2023-01-03 DIAGNOSIS — E78.2 MIXED HYPERLIPIDEMIA: ICD-10-CM

## 2023-01-03 DIAGNOSIS — N40.0 BPH WITHOUT URINARY OBSTRUCTION: ICD-10-CM

## 2023-01-03 DIAGNOSIS — E03.4 HYPOTHYROIDISM DUE TO ACQUIRED ATROPHY OF THYROID: ICD-10-CM

## 2023-01-03 DIAGNOSIS — I25.10 CORONARY ARTERY DISEASE INVOLVING NATIVE CORONARY ARTERY OF NATIVE HEART WITHOUT ANGINA PECTORIS: ICD-10-CM

## 2023-01-03 LAB
ALBUMIN SERPL BCP-MCNC: 4.3 G/DL (ref 3.2–4.9)
ALBUMIN/GLOB SERPL: 1.6 G/DL
ALP SERPL-CCNC: 55 U/L (ref 30–99)
ALT SERPL-CCNC: 22 U/L (ref 2–50)
ANION GAP SERPL CALC-SCNC: 9 MMOL/L (ref 7–16)
AST SERPL-CCNC: 30 U/L (ref 12–45)
BASOPHILS # BLD AUTO: 1.4 % (ref 0–1.8)
BASOPHILS # BLD: 0.09 K/UL (ref 0–0.12)
BILIRUB SERPL-MCNC: 0.7 MG/DL (ref 0.1–1.5)
BUN SERPL-MCNC: 20 MG/DL (ref 8–22)
CALCIUM ALBUM COR SERPL-MCNC: 9.6 MG/DL (ref 8.5–10.5)
CALCIUM SERPL-MCNC: 9.8 MG/DL (ref 8.5–10.5)
CHLORIDE SERPL-SCNC: 104 MMOL/L (ref 96–112)
CHOLEST SERPL-MCNC: 158 MG/DL (ref 100–199)
CO2 SERPL-SCNC: 26 MMOL/L (ref 20–33)
CREAT SERPL-MCNC: 1.12 MG/DL (ref 0.5–1.4)
EOSINOPHIL # BLD AUTO: 0.36 K/UL (ref 0–0.51)
EOSINOPHIL NFR BLD: 5.8 % (ref 0–6.9)
ERYTHROCYTE [DISTWIDTH] IN BLOOD BY AUTOMATED COUNT: 48.5 FL (ref 35.9–50)
EST. AVERAGE GLUCOSE BLD GHB EST-MCNC: 123 MG/DL
FASTING STATUS PATIENT QL REPORTED: NORMAL
GFR SERPLBLD CREATININE-BSD FMLA CKD-EPI: 68 ML/MIN/1.73 M 2
GLOBULIN SER CALC-MCNC: 2.7 G/DL (ref 1.9–3.5)
GLUCOSE SERPL-MCNC: 91 MG/DL (ref 65–99)
HBA1C MFR BLD: 5.9 % (ref 4–5.6)
HCT VFR BLD AUTO: 45.6 % (ref 42–52)
HDLC SERPL-MCNC: 46 MG/DL
HGB BLD-MCNC: 15.2 G/DL (ref 14–18)
IMM GRANULOCYTES # BLD AUTO: 0.01 K/UL (ref 0–0.11)
IMM GRANULOCYTES NFR BLD AUTO: 0.2 % (ref 0–0.9)
LDLC SERPL CALC-MCNC: 94 MG/DL
LYMPHOCYTES # BLD AUTO: 1.63 K/UL (ref 1–4.8)
LYMPHOCYTES NFR BLD: 26 % (ref 22–41)
MCH RBC QN AUTO: 31.7 PG (ref 27–33)
MCHC RBC AUTO-ENTMCNC: 33.3 G/DL (ref 33.7–35.3)
MCV RBC AUTO: 95.2 FL (ref 81.4–97.8)
MONOCYTES # BLD AUTO: 0.72 K/UL (ref 0–0.85)
MONOCYTES NFR BLD AUTO: 11.5 % (ref 0–13.4)
NEUTROPHILS # BLD AUTO: 3.45 K/UL (ref 1.82–7.42)
NEUTROPHILS NFR BLD: 55.1 % (ref 44–72)
NRBC # BLD AUTO: 0 K/UL
NRBC BLD-RTO: 0 /100 WBC
PLATELET # BLD AUTO: 307 K/UL (ref 164–446)
PMV BLD AUTO: 9.9 FL (ref 9–12.9)
POTASSIUM SERPL-SCNC: 4.5 MMOL/L (ref 3.6–5.5)
PROT SERPL-MCNC: 7 G/DL (ref 6–8.2)
PSA SERPL-MCNC: 1.53 NG/ML (ref 0–4)
RBC # BLD AUTO: 4.79 M/UL (ref 4.7–6.1)
SODIUM SERPL-SCNC: 139 MMOL/L (ref 135–145)
TRIGL SERPL-MCNC: 92 MG/DL (ref 0–149)
TSH SERPL DL<=0.005 MIU/L-ACNC: 0.58 UIU/ML (ref 0.38–5.33)
WBC # BLD AUTO: 6.3 K/UL (ref 4.8–10.8)

## 2023-01-03 PROCEDURE — 83036 HEMOGLOBIN GLYCOSYLATED A1C: CPT

## 2023-01-03 PROCEDURE — 99214 OFFICE O/P EST MOD 30 MIN: CPT | Performed by: INTERNAL MEDICINE

## 2023-01-03 PROCEDURE — 80061 LIPID PANEL: CPT

## 2023-01-03 PROCEDURE — 84153 ASSAY OF PSA TOTAL: CPT

## 2023-01-03 PROCEDURE — 84443 ASSAY THYROID STIM HORMONE: CPT

## 2023-01-03 PROCEDURE — 80053 COMPREHEN METABOLIC PANEL: CPT

## 2023-01-03 PROCEDURE — 36415 COLL VENOUS BLD VENIPUNCTURE: CPT

## 2023-01-03 PROCEDURE — 85025 COMPLETE CBC W/AUTO DIFF WBC: CPT

## 2023-01-03 RX ORDER — ATORVASTATIN CALCIUM 20 MG/1
TABLET, FILM COATED ORAL
Qty: 90 TABLET | Refills: 3 | Status: SHIPPED | OUTPATIENT
Start: 2023-01-03 | End: 2023-05-03 | Stop reason: SDUPTHER

## 2023-01-03 RX ORDER — LEVOTHYROXINE SODIUM 0.12 MG/1
125 TABLET ORAL
Qty: 90 TABLET | Refills: 4
Start: 2023-01-03 | End: 2023-05-03 | Stop reason: SDUPTHER

## 2023-01-03 ASSESSMENT — ENCOUNTER SYMPTOMS
EYES NEGATIVE: 1
GASTROINTESTINAL NEGATIVE: 1
MUSCULOSKELETAL NEGATIVE: 1
NEUROLOGICAL NEGATIVE: 1
CONSTITUTIONAL NEGATIVE: 1
PSYCHIATRIC NEGATIVE: 1
RESPIRATORY NEGATIVE: 1
CARDIOVASCULAR NEGATIVE: 1

## 2023-01-03 ASSESSMENT — PATIENT HEALTH QUESTIONNAIRE - PHQ9: CLINICAL INTERPRETATION OF PHQ2 SCORE: 0

## 2023-01-03 ASSESSMENT — FIBROSIS 4 INDEX: FIB4 SCORE: 1.54

## 2023-01-03 NOTE — PROGRESS NOTES
Subjective     Dusty Eid is a 77 y.o. male who presents with Follow-Up  The patient is here for followup of chronic medical problems listed below. The patient is compliant with medications and having no side effects from them. Denies chest pain, abdominal pain, dyspnea, myalgias, or cough.  Patient Active Problem List   Diagnosis    Hypothyroidism due to acquired atrophy of thyroid    Mixed hyperlipidemia    Encounter for Department of Transportation (DOT) examination for driving license renewal    IFG (impaired fasting glucose)    Fatty liver    Other male erectile dysfunction    Epididymal cyst- left    Vitamin D deficiency    GIVENS (dyspnea on exertion)    Exertional chest pain    Sinus bradycardia    PVC (premature ventricular contraction)    Coronary artery disease involving native coronary artery of native heart without angina pectoris-  negative cardiac PET/CT October 2022     Outpatient Medications Prior to Visit   Medication Sig Dispense Refill    sildenafil citrate (VIAGRA) 100 MG tablet Take 1 Tablet by mouth as needed for Erectile Dysfunction. 90 Tablet 2    levothyroxine (SYNTHROID) 125 MCG Tab TAKE ONE TABLET BY MOUTH EVERY MORNING ON AN EMPTY STOMACH 90 Tablet 4    atorvastatin (LIPITOR) 20 MG Tab TAKE ONE TABLET BY MOUTH ONE TIME DAILY 90 Tablet 1    aspirin EC (ECOTRIN) 81 MG Tablet Delayed Response Take 1 Tab by mouth every day. 90 Tab 4     No facility-administered medications prior to visit.     Codeine  No visits with results within 1 Month(s) from this visit.   Latest known visit with results is:   Hospital Outpatient Visit on 08/09/2022   Component Date Value    25-Hydroxy   Vitamin D 25 08/09/2022 36     Ferritin 08/09/2022 140.0     Iron 08/09/2022 100     Total Iron Binding 08/09/2022 309     Unsat Iron Binding 08/09/2022 209     % Saturation 08/09/2022 32     Folate -Folic Acid 08/09/2022 8.8     Vitamin B12 -True Cobala* 08/09/2022 2321 (H)     Glycohemoglobin 08/09/2022 5.6      Est Avg Glucose 08/09/2022 114     Sed Rate Westergren 08/09/2022 6     WBC 08/09/2022 6.1     RBC 08/09/2022 4.52 (L)     Hemoglobin 08/09/2022 14.6     Hematocrit 08/09/2022 42.8     MCV 08/09/2022 94.7     MCH 08/09/2022 32.3     MCHC 08/09/2022 34.1     RDW 08/09/2022 48.4     Platelet Count 08/09/2022 271     MPV 08/09/2022 10.2     Neutrophils-Polys 08/09/2022 51.10     Lymphocytes 08/09/2022 32.20     Monocytes 08/09/2022 11.60     Eosinophils 08/09/2022 4.00     Basophils 08/09/2022 0.80     Immature Granulocytes 08/09/2022 0.30     Nucleated RBC 08/09/2022 0.00     Neutrophils (Absolute) 08/09/2022 3.09     Lymphs (Absolute) 08/09/2022 1.95     Monos (Absolute) 08/09/2022 0.70     Eos (Absolute) 08/09/2022 0.24     Baso (Absolute) 08/09/2022 0.05     Immature Granulocytes (a* 08/09/2022 0.02     NRBC (Absolute) 08/09/2022 0.00     Cholesterol,Tot 08/09/2022 139     Triglycerides 08/09/2022 63     HDL 08/09/2022 46     LDL 08/09/2022 80     Sodium 08/09/2022 141     Potassium 08/09/2022 4.0     Chloride 08/09/2022 106     Co2 08/09/2022 25     Anion Gap 08/09/2022 10.0     Glucose 08/09/2022 101 (H)     Bun 08/09/2022 15     Creatinine 08/09/2022 1.04     Calcium 08/09/2022 9.6     AST(SGOT) 08/09/2022 21     ALT(SGPT) 08/09/2022 15     Alkaline Phosphatase 08/09/2022 41     Total Bilirubin 08/09/2022 0.6     Albumin 08/09/2022 4.2     Total Protein 08/09/2022 6.7     Globulin 08/09/2022 2.5     A-G Ratio 08/09/2022 1.7     Free T-4 08/09/2022 1.59     TSH 08/09/2022 0.660     Fasting Status 08/09/2022 Fasting     GFR (CKD-EPI) 08/09/2022 74       Lab Results   Component Value Date/Time    HBA1C 5.6 08/09/2022 06:45 AM    HBA1C 5.9 (H) 05/05/2022 08:38 AM     Lab Results   Component Value Date/Time    SODIUM 141 08/09/2022 06:45 AM    POTASSIUM 4.0 08/09/2022 06:45 AM    CHLORIDE 106 08/09/2022 06:45 AM    CO2 25 08/09/2022 06:45 AM    GLUCOSE 101 (H) 08/09/2022 06:45 AM    BUN 15 08/09/2022 06:45 AM     "CREATININE 1.04 08/09/2022 06:45 AM    CREATININE 1.15 12/09/2010 07:25 AM    BUNCREATRAT 16 12/09/2010 07:25 AM    GLOMRATE >59 12/09/2010 07:25 AM    ALKPHOSPHAT 41 08/09/2022 06:45 AM    ASTSGOT 21 08/09/2022 06:45 AM    ALTSGPT 15 08/09/2022 06:45 AM    TBILIRUBIN 0.6 08/09/2022 06:45 AM     No results found for: INR  Lab Results   Component Value Date/Time    CHOLSTRLTOT 139 08/09/2022 06:45 AM    LDL 80 08/09/2022 06:45 AM    HDL 46 08/09/2022 06:45 AM    TRIGLYCERIDE 63 08/09/2022 06:45 AM       Lab Results   Component Value Date/Time    TESTOSTERONE 695 06/30/2014 10:29 AM     Lab Results   Component Value Date/Time    TSH 20.800 (H) 03/29/2011 11:33 AM    TSH 20.440 (H) 12/09/2010 07:25 AM     Lab Results   Component Value Date/Time    FREET4 1.59 08/09/2022 06:45 AM    FREET4 1.58 12/02/2020 11:49 AM     No results found for: URICACID  No components found for: VITB12  Lab Results   Component Value Date/Time    25HYDROXY 36 08/09/2022 06:45 AM    25HYDROXY 37 05/05/2022 08:38 AM               HPI    Review of Systems   Constitutional: Negative.    HENT: Negative.     Eyes: Negative.    Respiratory: Negative.     Cardiovascular: Negative.    Gastrointestinal: Negative.    Genitourinary: Negative.    Musculoskeletal: Negative.    Skin: Negative.    Neurological: Negative.    Endo/Heme/Allergies: Negative.    Psychiatric/Behavioral: Negative.              Objective     /70 (BP Location: Left arm, Patient Position: Sitting, BP Cuff Size: Adult)   Pulse 61   Temp 37.4 °C (99.4 °F) (Temporal)   Resp 16   Ht 1.727 m (5' 8\")   Wt 73.5 kg (162 lb)   SpO2 95%   BMI 24.63 kg/m²      Physical Exam  Constitutional:       General: He is not in acute distress.     Appearance: He is well-developed. He is not diaphoretic.   HENT:      Head: Normocephalic and atraumatic.      Right Ear: External ear normal.      Left Ear: External ear normal.      Nose: Nose normal.      Mouth/Throat:      Pharynx: No " oropharyngeal exudate.   Eyes:      General: No scleral icterus.        Right eye: No discharge.         Left eye: No discharge.      Conjunctiva/sclera: Conjunctivae normal.      Pupils: Pupils are equal, round, and reactive to light.   Neck:      Thyroid: No thyromegaly.      Vascular: No JVD.      Trachea: No tracheal deviation.   Cardiovascular:      Rate and Rhythm: Normal rate and regular rhythm.      Heart sounds: Normal heart sounds. No murmur heard.    No friction rub. No gallop.   Pulmonary:      Effort: Pulmonary effort is normal. No respiratory distress.      Breath sounds: Normal breath sounds. No stridor. No wheezing or rales.   Chest:      Chest wall: No tenderness.   Abdominal:      General: Bowel sounds are normal. There is no distension.      Palpations: Abdomen is soft. There is no mass.      Tenderness: There is no abdominal tenderness. There is no guarding or rebound.   Musculoskeletal:         General: No tenderness. Normal range of motion.      Cervical back: Normal range of motion and neck supple.   Lymphadenopathy:      Cervical: No cervical adenopathy.   Skin:     General: Skin is warm and dry.      Coloration: Skin is not pale.      Findings: No erythema or rash.   Neurological:      Mental Status: He is alert and oriented to person, place, and time.      Motor: No abnormal muscle tone.      Coordination: Coordination normal.      Deep Tendon Reflexes: Reflexes are normal and symmetric. Reflexes normal.   Psychiatric:         Behavior: Behavior normal.         Thought Content: Thought content normal.         Judgment: Judgment normal.                           Assessment & Plan   1. Dyslipidemia  Good control continue current regimen.  Atorvastatin 20 mg daily.  - TSH; Future  - Comp Metabolic Panel; Future  - Lipid Profile; Future  - CBC WITH DIFFERENTIAL; Future    2. IFG (impaired fasting glucose)  Under good control continue monitoring diet and exercise.  - HEMOGLOBIN A1C; Future    3.  BPH without urinary obstruction  Under good control continue surveillance.  No medicine.- PROSTATE SPECIFIC AG DIAGNOSTIC; Future    4. Hypothyroidism due to acquired atrophy of thyroid       Under good control continue current regimen  - levothyroxine (SYNTHROID) 125 MCG Tab; Take 1 Tablet by mouth every morning on an empty stomach.  Dispense: 90 Tablet; Refill: 4    5. Mixed hyperlipidemia  As above  - atorvastatin (LIPITOR) 20 MG Tab; TAKE ONE TABLET BY MOUTH ONE TIME DAILY  Dispense: 90 Tablet; Refill: 3    6. Coronary artery disease involving native coronary artery of native heart without angina pectoris  No chest pain PND orthopnea.  Continue statin and aspirin as above.  - aspirin EC (ECOTRIN) 81 MG Tablet Delayed Response; Take 1 Tablet by mouth every day.  Dispense: 90 Tablet; Refill: 4    7. PVC (premature ventricular contraction)  Surveillance.  No treatment.  Holter monitor event monitor   showed no significant  PVCs

## 2023-01-11 ENCOUNTER — OFFICE VISIT (OUTPATIENT)
Dept: URGENT CARE | Facility: CLINIC | Age: 78
End: 2023-01-11
Payer: MEDICARE

## 2023-01-11 VITALS
OXYGEN SATURATION: 99 % | WEIGHT: 160 LBS | DIASTOLIC BLOOD PRESSURE: 76 MMHG | HEIGHT: 68 IN | BODY MASS INDEX: 24.25 KG/M2 | HEART RATE: 55 BPM | RESPIRATION RATE: 18 BRPM | SYSTOLIC BLOOD PRESSURE: 114 MMHG | TEMPERATURE: 97.9 F

## 2023-01-11 DIAGNOSIS — S61.501A FLEXOR TENDON LACERATION OF WRIST WITH OPEN WOUND, RIGHT, INITIAL ENCOUNTER: Primary | ICD-10-CM

## 2023-01-11 DIAGNOSIS — S66.921A FLEXOR TENDON LACERATION OF WRIST WITH OPEN WOUND, RIGHT, INITIAL ENCOUNTER: Primary | ICD-10-CM

## 2023-01-11 DIAGNOSIS — S61.511A LACERATION OF SKIN OF RIGHT WRIST, INITIAL ENCOUNTER: ICD-10-CM

## 2023-01-11 PROCEDURE — 90714 TD VACC NO PRESV 7 YRS+ IM: CPT | Performed by: EMERGENCY MEDICINE

## 2023-01-11 PROCEDURE — 90471 IMMUNIZATION ADMIN: CPT | Performed by: EMERGENCY MEDICINE

## 2023-01-11 PROCEDURE — 12032 INTMD RPR S/A/T/EXT 2.6-7.5: CPT | Performed by: EMERGENCY MEDICINE

## 2023-01-11 ASSESSMENT — FIBROSIS 4 INDEX: FIB4 SCORE: 1.6

## 2023-01-12 NOTE — PROGRESS NOTES
"  Subjective:     Dusty Eid  is a 77 y.o. male who presents for Laceration (About 3 hours ago, right wrist area )       Patient is 77-year-old male who presents for evaluation of a laceration to his right (dominant) wrist, approximately 3 hours prior to exam.  The patient states that he tripped in his garage, fell against an old trophy for his 1955 Thunderbird, it had a sharp edge and caused a laceration to the volar ulnar aspect of his distal forearm.  Denies any sensory changes, states he has normal range of motion of the wrist.  His last tetanus shot was in 1952.  He is not diabetic, he is otherwise healthy.   Review of Systems   Skin:         Laceration to the wrist    Allergies   Allergen Reactions    Codeine Nausea     Past Medical History:   Diagnosis Date    Cold 3/2017    has not resolved/cxr ordered    Erectile dysfunction     Heart burn     L arm    Hyperlipidemia     Indigestion     Infectious disease 3/2017    flu    Mixed hyperlipidemia 11/25/2015    Renal disorder     kidney stones    Thyroid disease         Objective:   /76   Pulse (!) 55   Temp 36.6 °C (97.9 °F) (Temporal)   Resp 18   Ht 1.727 m (5' 8\")   Wt 72.6 kg (160 lb)   SpO2 99%   BMI 24.33 kg/m²   Physical Exam  Vitals and nursing note reviewed.   Constitutional:       Appearance: Normal appearance. He is not ill-appearing, toxic-appearing or diaphoretic.   HENT:      Head: Normocephalic and atraumatic.      Nose: No rhinorrhea.   Eyes:      General: No scleral icterus.  Pulmonary:      Effort: Pulmonary effort is normal. No respiratory distress.   Musculoskeletal:      Comments: Normal flexion and extension of the wrist, normal lateral movement.  Normal flexion and extension of the fingers.  Strong .   Skin:     General: Skin is warm.      Findings: No rash.      Comments: Laceration to the volar ulnar distal right (dominant) wrist.  Laceration is 5 cm in length.  Minimal bleeding.  Fairly clean edges, no gross " contamination.  It is noted to extend down to the tendon sheath of the extensor tendon likely of the flexor carpi ulnaris.  Flexion and extension and lateral movement of the wrist shows that the tendon remains intact although there is some tearing of a few superficial fibers.     Neurological:      Mental Status: He is alert and oriented to person, place, and time.      Comments: Intact distal sensation in the fingers to light touch.   Psychiatric:         Mood and Affect: Mood normal.         Behavior: Behavior normal.         Thought Content: Thought content normal.         Assessment/Plan:     Encounter Diagnoses   Name Primary?    Flexor tendon laceration of wrist with open wound, right, initial encounter Yes    Laceration of skin of right wrist, initial encounter        Patient with dominant wrist lac, very superficial injury to flexor carpi ulnaris.  Will close wound in layers with tendon sheath + skin.  Pt instructed on wound care, will wear splint to protect for the next week, suture removal 10-14 days.  Will bring tetanus current as well.      Assessment    1. Flexor tendon laceration of wrist with open wound, right, initial encounter    2. Laceration of skin of right wrist, initial encounter  - TD Preservative Free =>6yo IM      See AVS Instructions below for written guidance provided to patient on after-visit management and care in addition to our verbal discussion during the visit.    Please note that this dictation was created using voice recognition software. I have attempted to correct all errors, but there may be sound-alike, spelling, grammar and possibly content errors that I did not discover before finalizing the note.

## 2023-01-12 NOTE — PROCEDURES
Laceration Repair    Date/Time: 1/11/2023 3:45 PM  Performed by: Aniyah Still M.D.  Authorized by: Aniyah Still M.D.   Body area: upper extremity  Location details: right lower arm  Laceration length: 5 cm  Foreign bodies: no foreign bodies  Tendon involvement: superficial (flexor carpi ulnaris)  Nerve involvement: none  Vascular damage: no  Anesthesia: local infiltration    Anesthesia:  Local Anesthetic: lidocaine 1% without epinephrine  Anesthetic total: 4 mL    Sedation:  Patient sedated: no    Preparation: Patient was prepped and draped in the usual sterile fashion.  Irrigation solution: saline  Irrigation method: syringe  Amount of cleaning: standard  Debridement: none  Degree of undermining: none  Skin closure: 5-0 nylon (x 5 sutures)  Fascia closure: 5-0 Vicryl (x 2 sutures)  Number of sutures: 7  Technique: simple  Approximation: close  Approximation difficulty: complex  Dressing: antibiotic ointment  Patient tolerance: patient tolerated the procedure well with no immediate complications

## 2023-01-12 NOTE — PATIENT INSTRUCTIONS
You have 2 deep dissolvable sutures, 5 skin sutures.    Skin sutures should be removed in 10-14 days.      Wear splint to protect it while awake for 7-10 days.

## 2023-01-23 ENCOUNTER — OFFICE VISIT (OUTPATIENT)
Dept: URGENT CARE | Facility: CLINIC | Age: 78
End: 2023-01-23
Payer: MEDICARE

## 2023-01-23 VITALS
WEIGHT: 160 LBS | HEART RATE: 64 BPM | HEIGHT: 68 IN | BODY MASS INDEX: 24.25 KG/M2 | TEMPERATURE: 97.2 F | DIASTOLIC BLOOD PRESSURE: 70 MMHG | SYSTOLIC BLOOD PRESSURE: 124 MMHG | RESPIRATION RATE: 20 BRPM | OXYGEN SATURATION: 98 %

## 2023-01-23 DIAGNOSIS — Z48.02 VISIT FOR SUTURE REMOVAL: ICD-10-CM

## 2023-01-23 PROCEDURE — 99212 OFFICE O/P EST SF 10 MIN: CPT | Performed by: PHYSICIAN ASSISTANT

## 2023-01-23 ASSESSMENT — FIBROSIS 4 INDEX: FIB4 SCORE: 1.6

## 2023-01-23 NOTE — PROGRESS NOTES
Subjective:   Dusty Eid is a 77 y.o. male who presents today with   Chief Complaint   Patient presents with    Suture Removal     Last visit: 01/11/23, 7 total; 2 dissolvable and 5 regular sutures, no signs of infection, Rt wrist,  pt feels great      Suture / Staple Removal  The sutures were placed 11 to 14 days ago. He tried nothing since the wound repair. There has been no drainage from the wound. There is no redness present. There is no swelling present. There is no pain present. He has no difficulty moving the affected extremity or digit.   Patient was seen 12 days ago in urgent care setting and had sutures placed at that time.    PMH:  has a past medical history of Cold (3/2017), Erectile dysfunction, Heart burn, Hyperlipidemia, Indigestion, Infectious disease (3/2017), Mixed hyperlipidemia (11/25/2015), Renal disorder, and Thyroid disease.  MEDS:   Current Outpatient Medications:     levothyroxine (SYNTHROID) 125 MCG Tab, Take 1 Tablet by mouth every morning on an empty stomach., Disp: 90 Tablet, Rfl: 4    atorvastatin (LIPITOR) 20 MG Tab, TAKE ONE TABLET BY MOUTH ONE TIME DAILY, Disp: 90 Tablet, Rfl: 3    aspirin EC (ECOTRIN) 81 MG Tablet Delayed Response, Take 1 Tablet by mouth every day., Disp: 90 Tablet, Rfl: 4    sildenafil citrate (VIAGRA) 100 MG tablet, Take 1 Tablet by mouth as needed for Erectile Dysfunction., Disp: 90 Tablet, Rfl: 2  ALLERGIES:   Allergies   Allergen Reactions    Codeine Nausea     SURGHX:   Past Surgical History:   Procedure Laterality Date    LIPOMA EXCISION N/A 4/19/2017    Procedure: LIPOMA EXCISION - 5CM  FROM MIDBACK/NECK;  Surgeon: David Rivas M.D.;  Location: SURGERY SAME DAY [facility];  Service:     EYE SURGERY      as a child    HERNIA REPAIR Right      SOCHX:  reports that he has never smoked. He has never used smokeless tobacco. He reports that he does not currently use alcohol after a past usage of about 1.2 oz per week. He reports that he does not  "use drugs.  FH: Reviewed with patient, not pertinent to this visit.     Review of Systems   Skin:         Healing laceration of wrist      Objective:   /70 (BP Location: Left arm, Patient Position: Sitting, BP Cuff Size: Adult)   Pulse 64   Temp 36.2 °C (97.2 °F) (Temporal)   Resp 20   Ht 1.727 m (5' 8\")   Wt 72.6 kg (160 lb)   SpO2 98%   BMI 24.33 kg/m²   Physical Exam  Vitals and nursing note reviewed.   Constitutional:       General: He is not in acute distress.     Appearance: Normal appearance. He is well-developed. He is not ill-appearing or toxic-appearing.   HENT:      Head: Normocephalic and atraumatic.      Right Ear: Hearing normal.      Left Ear: Hearing normal.   Cardiovascular:      Rate and Rhythm: Normal rate.   Pulmonary:      Effort: Pulmonary effort is normal.   Musculoskeletal:      Comments: Patient again is noted to have full flexion and extension of the right wrist and neurovascular intact distally. No deficits on exam   Skin:     General: Skin is warm and dry.      Comments: Well-healing laceration to the lateral aspect of the right wrist with 5 sutures intact and no signs of surrounding erythema or drainage or discharge.   Neurological:      Mental Status: He is alert.      Coordination: Coordination normal.   Psychiatric:         Mood and Affect: Mood normal.       Assessment/Plan:   Assessment    1. Visit for suture removal  Patient tolerated suture removal today with no dehiscence of wound site and antibiotic ointment and bandage was placed.  Recommend continuing with good wound care as he has been.  No signs of infection.    Differential diagnosis, natural history, supportive care, and indications for immediate follow-up discussed.   Patient given instructions and understanding of medications and treatment.    If not improving in 3-5 days, F/U with PCP or return to UC if symptoms worsen.    Patient agreeable to plan.      Please note that this dictation was created using " voice recognition software. I have made every reasonable attempt to correct obvious errors, but I expect that there are errors of grammar and possibly content that I did not discover before finalizing the note.    Erwin Fletcher PA-C

## 2023-01-24 ENCOUNTER — APPOINTMENT (RX ONLY)
Dept: URBAN - METROPOLITAN AREA CLINIC 6 | Facility: CLINIC | Age: 78
Setting detail: DERMATOLOGY
End: 2023-01-24

## 2023-01-24 DIAGNOSIS — D18.0 HEMANGIOMA: ICD-10-CM

## 2023-01-24 DIAGNOSIS — L81.4 OTHER MELANIN HYPERPIGMENTATION: ICD-10-CM

## 2023-01-24 DIAGNOSIS — L57.0 ACTINIC KERATOSIS: ICD-10-CM

## 2023-01-24 DIAGNOSIS — D22 MELANOCYTIC NEVI: ICD-10-CM

## 2023-01-24 DIAGNOSIS — L82.1 OTHER SEBORRHEIC KERATOSIS: ICD-10-CM

## 2023-01-24 DIAGNOSIS — Z71.89 OTHER SPECIFIED COUNSELING: ICD-10-CM

## 2023-01-24 DIAGNOSIS — L82.0 INFLAMED SEBORRHEIC KERATOSIS: ICD-10-CM

## 2023-01-24 PROBLEM — D22.5 MELANOCYTIC NEVI OF TRUNK: Status: ACTIVE | Noted: 2023-01-24

## 2023-01-24 PROBLEM — D18.01 HEMANGIOMA OF SKIN AND SUBCUTANEOUS TISSUE: Status: ACTIVE | Noted: 2023-01-24

## 2023-01-24 PROCEDURE — 17110 DESTRUCTION B9 LES UP TO 14: CPT

## 2023-01-24 PROCEDURE — 17000 DESTRUCT PREMALG LESION: CPT | Mod: 59

## 2023-01-24 PROCEDURE — ? COUNSELING

## 2023-01-24 PROCEDURE — 99213 OFFICE O/P EST LOW 20 MIN: CPT | Mod: 25

## 2023-01-24 PROCEDURE — ? SUNSCREEN RECOMMENDATIONS

## 2023-01-24 PROCEDURE — 17003 DESTRUCT PREMALG LES 2-14: CPT | Mod: 59

## 2023-01-24 PROCEDURE — ? LIQUID NITROGEN

## 2023-01-24 ASSESSMENT — LOCATION ZONE DERM
LOCATION ZONE: LEG
LOCATION ZONE: EYELID
LOCATION ZONE: TRUNK
LOCATION ZONE: SCALP
LOCATION ZONE: FACE

## 2023-01-24 ASSESSMENT — LOCATION SIMPLE DESCRIPTION DERM
LOCATION SIMPLE: RIGHT CALF
LOCATION SIMPLE: UPPER BACK
LOCATION SIMPLE: POSTERIOR SCALP
LOCATION SIMPLE: LEFT THIGH
LOCATION SIMPLE: RIGHT FOREHEAD
LOCATION SIMPLE: RIGHT UPPER BACK
LOCATION SIMPLE: RIGHT INFERIOR EYELID
LOCATION SIMPLE: SCALP
LOCATION SIMPLE: ABDOMEN
LOCATION SIMPLE: LEFT SCALP
LOCATION SIMPLE: LEFT OCCIPITAL SCALP
LOCATION SIMPLE: LEFT CHEEK
LOCATION SIMPLE: LEFT FOREHEAD
LOCATION SIMPLE: LEFT TEMPLE

## 2023-01-24 ASSESSMENT — LOCATION DETAILED DESCRIPTION DERM
LOCATION DETAILED: MID-OCCIPITAL SCALP
LOCATION DETAILED: RIGHT INFERIOR MEDIAL UPPER BACK
LOCATION DETAILED: LEFT SUPERIOR CENTRAL BUCCAL CHEEK
LOCATION DETAILED: LEFT MEDIAL FOREHEAD
LOCATION DETAILED: RIGHT PROXIMAL CALF
LOCATION DETAILED: LEFT SUPERIOR PARIETAL SCALP
LOCATION DETAILED: LEFT SUPERIOR OCCIPITAL SCALP
LOCATION DETAILED: LEFT LATERAL ABDOMEN
LOCATION DETAILED: SUPERIOR THORACIC SPINE
LOCATION DETAILED: LEFT RIB CAGE
LOCATION DETAILED: LEFT ANTERIOR PROXIMAL THIGH
LOCATION DETAILED: RIGHT LATERAL INFERIOR EYELID
LOCATION DETAILED: RIGHT INFERIOR FOREHEAD
LOCATION DETAILED: LEFT MEDIAL TEMPLE
LOCATION DETAILED: LEFT CENTRAL FRONTAL SCALP

## 2023-01-24 NOTE — PROCEDURE: LIQUID NITROGEN
Consent: The patient's consent was obtained including but not limited to risks of crusting, scabbing, blistering, scarring, darker or lighter pigmentary change, recurrence, incomplete removal and infection.
Show Topical Anesthesia Variable?: Yes
Application Tool (Optional): Liquid Nitrogen Sprayer
Number Of Freeze-Thaw Cycles: 2 freeze-thaw cycles
Render Post-Care Instructions In Note?: no
Medical Necessity Clause: This procedure was medically necessary because the lesions that were treated were:
Medical Necessity Information: It is in your best interest to select a reason for this procedure from the list below. All of these items fulfill various CMS LCD requirements except the new and changing color options.
Detail Level: Zone
Duration Of Freeze Thaw-Cycle (Seconds): 8
Post-Care Instructions: I reviewed with the patient in detail post-care instructions. Patient is to wear sunprotection, and avoid picking at any of the treated lesions. Pt may apply Vaseline to crusted or scabbing areas.
Spray Paint Text: The liquid nitrogen was applied to the skin utilizing a spray paint frosting technique.

## 2023-05-03 ENCOUNTER — OFFICE VISIT (OUTPATIENT)
Dept: MEDICAL GROUP | Age: 78
End: 2023-05-03
Payer: MEDICARE

## 2023-05-03 VITALS
HEIGHT: 68 IN | TEMPERATURE: 98.6 F | BODY MASS INDEX: 24.4 KG/M2 | SYSTOLIC BLOOD PRESSURE: 102 MMHG | OXYGEN SATURATION: 98 % | WEIGHT: 161 LBS | HEART RATE: 53 BPM | DIASTOLIC BLOOD PRESSURE: 60 MMHG

## 2023-05-03 DIAGNOSIS — Z23 NEED FOR VACCINATION: ICD-10-CM

## 2023-05-03 DIAGNOSIS — I25.10 CORONARY ARTERY DISEASE INVOLVING NATIVE CORONARY ARTERY OF NATIVE HEART WITHOUT ANGINA PECTORIS: ICD-10-CM

## 2023-05-03 DIAGNOSIS — N52.9 VASCULOGENIC ERECTILE DYSFUNCTION, UNSPECIFIED VASCULOGENIC ERECTILE DYSFUNCTION TYPE: ICD-10-CM

## 2023-05-03 DIAGNOSIS — E55.9 VITAMIN D DEFICIENCY: ICD-10-CM

## 2023-05-03 DIAGNOSIS — I70.0 ATHEROSCLEROSIS OF AORTA (HCC): ICD-10-CM

## 2023-05-03 DIAGNOSIS — Z00.00 MEDICARE ANNUAL WELLNESS VISIT, SUBSEQUENT: ICD-10-CM

## 2023-05-03 DIAGNOSIS — K76.0 FATTY LIVER: ICD-10-CM

## 2023-05-03 DIAGNOSIS — R73.01 IFG (IMPAIRED FASTING GLUCOSE): ICD-10-CM

## 2023-05-03 DIAGNOSIS — Z91.81 RISK FOR FALLS: ICD-10-CM

## 2023-05-03 DIAGNOSIS — E03.4 HYPOTHYROIDISM DUE TO ACQUIRED ATROPHY OF THYROID: ICD-10-CM

## 2023-05-03 DIAGNOSIS — E78.5 DYSLIPIDEMIA: ICD-10-CM

## 2023-05-03 DIAGNOSIS — M19.012 PRIMARY OSTEOARTHRITIS OF LEFT SHOULDER: ICD-10-CM

## 2023-05-03 PROBLEM — R07.9 EXERTIONAL CHEST PAIN: Status: RESOLVED | Noted: 2022-08-17 | Resolved: 2023-05-03

## 2023-05-03 PROCEDURE — G0439 PPPS, SUBSEQ VISIT: HCPCS | Mod: 25 | Performed by: INTERNAL MEDICINE

## 2023-05-03 PROCEDURE — 99214 OFFICE O/P EST MOD 30 MIN: CPT | Mod: 25 | Performed by: INTERNAL MEDICINE

## 2023-05-03 RX ORDER — CLOSTRIDIUM TETANI TOXOID ANTIGEN (FORMALDEHYDE INACTIVATED), CORYNEBACTERIUM DIPHTHERIAE TOXOID ANTIGEN (FORMALDEHYDE INACTIVATED), BORDETELLA PERTUSSIS TOXOID ANTIGEN (GLUTARALDEHYDE INACTIVATED), BORDETELLA PERTUSSIS FILAMENTOUS HEMAGGLUTININ ANTIGEN (FORMALDEHYDE INACTIVATED), BORDETELLA PERTUSSIS PERTACTIN ANTIGEN, AND BORDETELLA PERTUSSIS FIMBRIAE 2/3 ANTIGEN 5; 2; 2.5; 5; 3; 5 [LF]/.5ML; [LF]/.5ML; UG/.5ML; UG/.5ML; UG/.5ML; UG/.5ML
0.5 INJECTION, SUSPENSION INTRAMUSCULAR ONCE
Qty: 0.5 ML | Refills: 0 | Status: SHIPPED
Start: 2023-05-03 | End: 2023-05-03

## 2023-05-03 RX ORDER — SILDENAFIL 100 MG/1
100 TABLET, FILM COATED ORAL PRN
Qty: 90 TABLET | Refills: 4 | Status: SHIPPED | OUTPATIENT
Start: 2023-05-03

## 2023-05-03 RX ORDER — ATORVASTATIN CALCIUM 20 MG/1
TABLET, FILM COATED ORAL
Qty: 90 TABLET | Refills: 4 | Status: SHIPPED
Start: 2023-05-03 | End: 2023-05-03

## 2023-05-03 RX ORDER — LEVOTHYROXINE SODIUM 0.12 MG/1
125 TABLET ORAL
Qty: 90 TABLET | Refills: 4 | Status: SHIPPED | OUTPATIENT
Start: 2023-05-03 | End: 2024-01-04 | Stop reason: SDUPTHER

## 2023-05-03 RX ORDER — ATORVASTATIN CALCIUM 40 MG/1
40 TABLET, FILM COATED ORAL NIGHTLY
Qty: 90 TABLET | Refills: 4 | Status: SHIPPED | OUTPATIENT
Start: 2023-05-03 | End: 2023-05-04 | Stop reason: SDUPTHER

## 2023-05-03 ASSESSMENT — ACTIVITIES OF DAILY LIVING (ADL): BATHING_REQUIRES_ASSISTANCE: 0

## 2023-05-03 ASSESSMENT — PATIENT HEALTH QUESTIONNAIRE - PHQ9: CLINICAL INTERPRETATION OF PHQ2 SCORE: 0

## 2023-05-03 ASSESSMENT — ENCOUNTER SYMPTOMS: GENERAL WELL-BEING: GOOD

## 2023-05-03 ASSESSMENT — FIBROSIS 4 INDEX: FIB4 SCORE: 1.6

## 2023-05-03 NOTE — PROGRESS NOTES
Chief Complaint   Patient presents with    Annual Exam     AWV       HPI:  Dusty is a 77 y.o. here for Medicare Annual Wellness Visit    And  The patient is here for followup of chronic medical problems listed below. The patient is compliant with medications and having no side effects from them. Denies chest pain, abdominal pain, dyspnea, myalgias, or cough.       Patient Active Problem List    Diagnosis Date Noted    Dyslipidemia 01/03/2023    PVC (premature ventricular contraction) 11/10/2022    Coronary artery disease involving native coronary artery of native heart without angina pectoris-  negative cardiac PET/CT October 2022 11/10/2022    GIVENS (dyspnea on exertion) 08/17/2022    Exertional chest pain 08/17/2022    Sinus bradycardia 08/17/2022    Vitamin D deficiency 05/05/2021    Epididymal cyst- left 06/11/2019    Other male erectile dysfunction 07/09/2018    Fatty liver 05/12/2017    Encounter for Department of Transportation (DOT) examination for driving license renewal 03/29/2017    IFG (impaired fasting glucose) 03/29/2017    Hypothyroidism due to acquired atrophy of thyroid 12/29/2010       Current Outpatient Medications   Medication Sig Dispense Refill    levothyroxine (SYNTHROID) 125 MCG Tab Take 1 Tablet by mouth every morning on an empty stomach. 90 Tablet 4    atorvastatin (LIPITOR) 20 MG Tab TAKE ONE TABLET BY MOUTH ONE TIME DAILY 90 Tablet 3    aspirin EC (ECOTRIN) 81 MG Tablet Delayed Response Take 1 Tablet by mouth every day. 90 Tablet 4    sildenafil citrate (VIAGRA) 100 MG tablet Take 1 Tablet by mouth as needed for Erectile Dysfunction. 90 Tablet 2     No current facility-administered medications for this visit.        Patient is taking medications as noted in medication list.  Current supplements as per medication list.     Allergies: Codeine    Current social contact/activities:  family    Is patient current with immunizations? Yes.    He  reports that he has never smoked. He has never  used smokeless tobacco. He reports that he does not currently use alcohol after a past usage of about 1.2 oz per week. He reports that he does not use drugs.  Counseling given: Not Answered      ROS:    Gait: Uses no assistive device   Ostomy: No   Other tubes: No   Amputations: No   Chronic oxygen use No   Last eye exam  today   Wears hearing aids: No   : Denies any urinary leakage during the last 6 months    Screening:    Depression Screening  Little interest or pleasure in doing things?  0 - not at all  Feeling down, depressed, or hopeless? 0 - not at all  Patient Health Questionnaire Score: 0    If depressive symptoms identified deferred to follow up visit unless specifically addressed in assessment and plan.    Interpretation of PHQ-9 Total Score   Score Severity   1-4 No Depression   5-9 Mild Depression   10-14 Moderate Depression   15-19 Moderately Severe Depression   20-27 Severe Depression    Screening for Cognitive Impairment  Three Minute Recall (daughter, heaven, mountain)  3/3    David clock face with all 12 numbers and set the hands to show 10 past 11.  No    If cognitive concerns identified, deferred for follow up unless specifically addressed in assessment and plan.    Fall Risk Assessment  Has the patient had two or more falls in the last year or any fall with injury in the last year?  Yes  If fall risk identified, deferred for follow up unless specifically addressed in assessment and plan.    Safety Assessment  Throw rugs on floor.  No  Handrails on all stairs.  Yes  Good lighting in all hallways.  Yes  Difficulty hearing.  No  Patient counseled about all safety risks that were identified.    Functional Assessment ADLs  Are there any barriers preventing you from cooking for yourself or meeting nutritional needs?  No.    Are there any barriers preventing you from driving safely or obtaining transportation?  No.    Are there any barriers preventing you from using a telephone or calling for help?   No.    Are there any barriers preventing you from shopping?  No.    Are there any barriers preventing you from taking care of your own finances?  No.    Are there any barriers preventing you from managing your medications?  No.    Are there any barriers preventing you from showering, bathing or dressing yourself?  No.    Are you currently engaging in any exercise or physical activity?  Yes.     What is your perception of your health?  Good.    Advance Care Planning  Do you have an Advance Directive, Living Will, Durable Power of , or POLST? Yes    Living Will          Health Maintenance Summary            Overdue - IMM DTaP/Tdap/Td Vaccine (1 - Tdap) Overdue since 2023  Imm Admin: TD Vaccine              Annual Wellness Visit (Every 366 Days) Next due on 2022  Level of Service: ANNUAL WELLNESS VISIT-INCLUDES PPPS SUBSEQUE*    2022  Visit Dx: Medicare annual wellness visit, subsequent    2019  Subsequent Annual Wellness Visit - Includes PPPS ()    2019  Visit Dx: Medicare annual wellness visit, subsequent    2013  Done              IMM PNEUMOCOCCAL VACCINE: 65+ Years (Series Information) Completed      2018  Imm Admin: Pneumococcal polysaccharide vaccine (PPSV-23)    2016  Imm Admin: Pneumococcal Conjugate Vaccine (Prevnar/PCV-13)              HEPATITIS C SCREENING  Completed      2022  HCV Scrn ( [id number] 1xLife)              IMM INFLUENZA (Series Information) Completed      10/07/2022  Imm Admin: Influenza, Unspecified - HISTORICAL DATA    10/21/2021  Imm Admin: Influenza Vaccine Adult HD    2019  Imm Admin: Influenza Vaccine Adult HD    2016  Imm Admin: Influenza Vaccine Adult HD              COVID-19 Vaccine (Series Information) Completed      10/07/2022  Imm Admin: PFIZER BIVALENT BOOSTER SARS-COV-2 VACCINE (12+)    2022  Imm Admin: PFIZER SHOOK CAP SARS-COV-2 VACCINATION (12+)    11/15/2021  Imm  Admin: PFIZER PURPLE CAP SARS-COV-2 VACCINATION (12+)    02/04/2021  Imm Admin: PFIZER PURPLE CAP SARS-COV-2 VACCINATION (12+)    01/17/2021  Imm Admin: PFIZER PURPLE CAP SARS-COV-2 VACCINATION (12+)              IMM ZOSTER VACCINES (Series Information) Completed      03/17/2023  Outside Immunization: Zoster Bill (Shingrix)    12/14/2022  Imm Admin: Zoster Vaccine Recombinant (RZV) (SHINGRIX)              IMM HEP B VACCINE (Series Information) Aged Out      No completion history exists for this topic.              IMM MENINGOCOCCAL ACWY VACCINE (Series Information) Aged Out      No completion history exists for this topic.              Discontinued - COLORECTAL CANCER SCREENING  Discontinued        Frequency changed to Never automatically (Topic No Longer Applies)    11/26/2019  REFERRAL TO GI FOR COLONOSCOPY    02/12/2014  AMB REFERRAL TO GI FOR COLONOSCOPY                    Patient Care Team:  Marcial Mcfarlane M.D. as PCP - General (Internal Medicine)    Social History     Tobacco Use    Smoking status: Never    Smokeless tobacco: Never   Vaping Use    Vaping Use: Never used   Substance Use Topics    Alcohol use: Not Currently     Alcohol/week: 1.2 oz     Types: 2 Glasses of wine per week     Comment: Occasional wine     Drug use: No     Family History   Problem Relation Age of Onset    Cancer Father      He  has a past medical history of Cold (3/2017), Erectile dysfunction, Heart burn, Hyperlipidemia, Indigestion, Infectious disease (3/2017), Mixed hyperlipidemia (11/25/2015), Renal disorder, and Thyroid disease.   Past Surgical History:   Procedure Laterality Date    LIPOMA EXCISION N/A 4/19/2017    Procedure: LIPOMA EXCISION - 5CM  FROM MIDBACK/NECK;  Surgeon: David Rivas M.D.;  Location: SURGERY SAME DAY [facility];  Service:     EYE SURGERY      as a child    HERNIA REPAIR Right        Exam:   /60 (BP Location: Right arm, Patient Position: Sitting, BP Cuff Size: Adult)   Pulse (!) 53   Temp 37  "°C (98.6 °F) (Temporal)   Ht 1.727 m (5' 8\")   Wt 73 kg (161 lb)   SpO2 98%  Body mass index is 24.48 kg/m².    Hearing good.    Dentition good  Alert, oriented in no acute distress.  Eye contact is good, speech goal directed, affect calm      Assessment and Plan. The following treatment and monitoring plan is recommended:    1. Medicare annual wellness visit, subsequent  Completed and all parameters and metrics satisfied    2. Dyslipidemia  Good control continue current regimen  - Comp Metabolic Panel; Future  - CBC WITH DIFFERENTIAL; Future  - TSH; Future  - Lipid Profile; Future  - atorvastatin (LIPITOR) 40 MG Tab; Take 1 Tablet by mouth every evening.  Dispense: 90 Tablet; Refill: 4  - CT-HEART W/O CONT EVAL CALCIUM; Future    3. Vitamin D deficiency  Good control continue vitamin D3 2000 units daily.  - VITAMIN D,25 HYDROXY (DEFICIENCY); Future    4. IFG (impaired fasting glucose)  Good control continue monitoring diet and exercise.  - HEMOGLOBIN A1C; Future    5. Coronary artery disease involving native coronary artery of native heart without angina pectoris-  negative cardiac PET/CT October 2022  Good control continue atorvastatin 40 mg daily and  - aspirin EC (ECOTRIN) 81 MG Tablet Delayed Response; Take 1 Tablet by mouth every day.  Dispense: 90 Tablet; Refill: 4  - CT-HEART W/O CONT EVAL CALCIUM; Future    6. Hypothyroidism due to acquired atrophy of thyroid  Good control continue current regimen  - levothyroxine (SYNTHROID) 125 MCG Tab; Take 1 Tablet by mouth every morning on an empty stomach.  Dispense: 90 Tablet; Refill: 4    7. Coronary artery disease involving native coronary artery of native heart without angina pectoris  Good control continue current regimen of aspirin 81 mg daily and atorvastatin 40 mg daily  - aspirin EC (ECOTRIN) 81 MG Tablet Delayed Response; Take 1 Tablet by mouth every day.  Dispense: 90 Tablet; Refill: 4  -Complications 1.  W/O CONT EVAL CALCIUM; Future    8. Vasculogenic " erectile dysfunction, unspecified vasculogenic erectile dysfunction type  Good control continue current regimen  - sildenafil citrate (VIAGRA) 100 MG tablet; Take 1 Tablet by mouth as needed for Erectile Dysfunction.  Dispense: 90 Tablet; Refill: 4    9. Need for vaccination  Shingles vaccine recommended.  Also Tdap        10. Fatty liver  Need surveillance to rule out progression to cirrhosis.  Check ultrasound..  Liver function is normal CHEM panel.  Brother diagnosed with fatty liver  - US-ABDOMEN COMPLETE SURVEY; Future    11. Primary osteoarthritis of left shoulder  Follow-up with orthopedic surgeon.  Will need x-ray imaging and probable orthopedic referral.    12. Atherosclerosis of aorta (HCC)  Check 100 calcification scoring.  Consider increasing atorvastatin to 80 mg daily to get LDL less than 55.  - CT-HEART W/O CONT EVAL CALCIUM; Future    13. Risk for falls     - Patient identified as fall risk.  Appropriate orders and counseling given.  - Patient identified as fall risk.  Appropriate orders and counseling given.        Services suggested: No services needed at this time  Health Care Screening recommendations as per orders if indicated.  Referrals offered: PT/OT/Nutrition counseling/Behavioral Health/Smoking cessation as per orders if indicated.    Discussion today about general wellness and lifestyle habits:    Prevent falls and reduce trip hazards; Cautioned about securing or removing rugs.  Have a working fire alarm and carbon monoxide detector;   Engage in regular physical activity and social activities     Follow-up: No follow-ups on file.

## 2023-05-04 ENCOUNTER — OFFICE VISIT (OUTPATIENT)
Dept: CARDIOLOGY | Facility: MEDICAL CENTER | Age: 78
End: 2023-05-04
Payer: MEDICARE

## 2023-05-04 VITALS
DIASTOLIC BLOOD PRESSURE: 62 MMHG | BODY MASS INDEX: 24.1 KG/M2 | WEIGHT: 159 LBS | HEIGHT: 68 IN | HEART RATE: 58 BPM | RESPIRATION RATE: 16 BRPM | OXYGEN SATURATION: 98 % | SYSTOLIC BLOOD PRESSURE: 102 MMHG

## 2023-05-04 DIAGNOSIS — I49.3 PVC (PREMATURE VENTRICULAR CONTRACTION): ICD-10-CM

## 2023-05-04 DIAGNOSIS — I25.10 CORONARY ARTERIOSCLEROSIS: ICD-10-CM

## 2023-05-04 DIAGNOSIS — I70.0 ATHEROSCLEROSIS OF AORTA (HCC): ICD-10-CM

## 2023-05-04 DIAGNOSIS — E78.5 DYSLIPIDEMIA: ICD-10-CM

## 2023-05-04 DIAGNOSIS — K76.0 FATTY LIVER: ICD-10-CM

## 2023-05-04 PROBLEM — R00.1 SINUS BRADYCARDIA: Status: RESOLVED | Noted: 2022-08-17 | Resolved: 2023-05-04

## 2023-05-04 PROCEDURE — 99214 OFFICE O/P EST MOD 30 MIN: CPT | Performed by: INTERNAL MEDICINE

## 2023-05-04 RX ORDER — ATORVASTATIN CALCIUM 20 MG/1
20 TABLET, FILM COATED ORAL NIGHTLY
Qty: 100 TABLET | Refills: 3 | Status: SHIPPED
Start: 2023-05-04 | End: 2023-05-17

## 2023-05-04 RX ORDER — AMPICILLIN TRIHYDRATE 250 MG
600 CAPSULE ORAL 2 TIMES DAILY
COMMUNITY

## 2023-05-04 RX ORDER — GINSENG 100 MG
50 CAPSULE ORAL DAILY
COMMUNITY

## 2023-05-04 RX ORDER — AMOXICILLIN 500 MG
CAPSULE ORAL 2 TIMES DAILY
COMMUNITY

## 2023-05-04 ASSESSMENT — FIBROSIS 4 INDEX: FIB4 SCORE: 1.6

## 2023-05-04 NOTE — PROGRESS NOTES
"CARDIOLOGY OUTPATIENT FOLLOWUP    PCP: Marcial Mcfarlane M.D.    1. PVC (premature ventricular contraction)    2. Coronary arteriosclerosis    3. Atherosclerosis of aorta (HCC)    4. Dyslipidemia    5. Fatty liver        Dusty Eid is stable from a cardiovascular standpoint with well-controlled risk factors.  I do not believe he was benefiting from aspirin and I discontinued this.  Additionally, cholesterol remains elevated due to morning dosing of atorvastatin.  I advised moving this to the evening.  In light of this information, the patient prefers to take 20 mg and so his prescription was changed accordingly.  I expect the LDL cholesterol will fall to less than 70 with evening dosing.    Follow up: 1 year    Chief Complaint   Patient presents with    Premature Ventricular Contractions (PVCs)    Chest Pain     F/V Dx: Exertional chest pain    Dyslipidemia       History: Dusty Eid is a 77 y.o. male with history of frequent PVCs, impaired fasting glucose and coronary arterial sclerosis presenting for follow-up.  He has had a negative stress test and monitor showed 20% PVC burden.  LVEF is normal    Symptoms have improved significantly since we first evaluated him.  Blood pressure has been under excellent control.  Cholesterol control has been marginal but he is taking the atorvastatin in the morning.    He is concerned about fatty liver and foot itchiness-both things that were identified in his brother before his passing.      ROS:   10 point review systems is otherwise negative except as per the HPI    PE:  /62 (BP Location: Left arm, Patient Position: Sitting, BP Cuff Size: Adult)   Pulse (!) 58   Resp 16   Ht 1.727 m (5' 8\")   Wt 72.1 kg (159 lb)   SpO2 98%   BMI 24.18 kg/m²   Gen: no acute distress  HEENT: Symmetric face. Anicteric sclerae. Moist mucus membranes  NECK: No JVD. No lymphadenopathy  CARDIAC: Regular, Normal S1, S2, No murmur  VASCULATURE: carotids are normal " bilaterally without bruit  RESP: Clear to auscultation bilaterally  ABD: Soft, non-tender, non-distended  EXT: No edema, no clubbing or cyanosis  SKIN: Warm and dry  NEURO: No gross deficits  PSYCH: Appropriate affect, participates in conversation    The 10-year ASCVD risk score (Anthony BARBOZA, et al., 2019) is: 18.9%    Past Medical History:   Diagnosis Date    Cold 3/2017    has not resolved/cxr ordered    Erectile dysfunction     Exertional chest pain 8/17/2022    Heart burn     L arm    Hyperlipidemia     Indigestion     Infectious disease 3/2017    flu    Mixed hyperlipidemia 11/25/2015    Renal disorder     kidney stones    Thyroid disease      Allergies   Allergen Reactions    Codeine Nausea     Outpatient Encounter Medications as of 5/4/2023   Medication Sig Dispense Refill    Plant Sterols and Stanols (CHOLESTOFF PLUS PO) Take  by mouth 2 times a day.      Flaxseed, Linseed, (FLAX SEED OIL PO) Take 1,400 mg by mouth every day.      Omega-3 Fatty Acids (FISH OIL) 1200 MG Cap Take  by mouth 2 times a day.      Ascorbic Acid (SUPER C COMPLEX PO) Take  by mouth every day.      Red Yeast Rice 600 MG Cap Take 600 mg by mouth 2 times a day.      Cyanocobalamin (VITAMIN B 12 PO) Take 1,000 mcg by mouth 2 times a day.      Zinc 50 MG Tab Take 50 mg by mouth every day.      Omeprazole Magnesium (PRILOSEC PO) Take  by mouth every day.      Turmeric (QC TUMERIC COMPLEX PO) Take  by mouth 2 times a day.      atorvastatin (LIPITOR) 20 MG Tab Take 1 Tablet by mouth every evening. 100 Tablet 3    levothyroxine (SYNTHROID) 125 MCG Tab Take 1 Tablet by mouth every morning on an empty stomach. 90 Tablet 4    sildenafil citrate (VIAGRA) 100 MG tablet Take 1 Tablet by mouth as needed for Erectile Dysfunction. 90 Tablet 4    [DISCONTINUED] aspirin EC (ECOTRIN) 81 MG Tablet Delayed Response Take 1 Tablet by mouth every day. 90 Tablet 4    [DISCONTINUED] atorvastatin (LIPITOR) 40 MG Tab Take 1 Tablet by mouth every evening. 90 Tablet  4     No facility-administered encounter medications on file as of 5/4/2023.     Social History     Socioeconomic History    Marital status:      Spouse name: Not on file    Number of children: Not on file    Years of education: Not on file    Highest education level: Not on file   Occupational History    Not on file   Tobacco Use    Smoking status: Never    Smokeless tobacco: Never   Vaping Use    Vaping Use: Never used   Substance and Sexual Activity    Alcohol use: Not Currently     Alcohol/week: 1.2 oz     Types: 2 Glasses of wine per week     Comment: Occasional wine     Drug use: No    Sexual activity: Not Currently     Partners: Female   Other Topics Concern    Not on file   Social History Narrative    Not on file     Social Determinants of Health     Financial Resource Strain: Not on file   Food Insecurity: Not on file   Transportation Needs: Not on file   Physical Activity: Not on file   Stress: Not on file   Social Connections: Not on file   Intimate Partner Violence: Not on file   Housing Stability: Not on file       Studies  Lab Results   Component Value Date/Time    CHOLSTRLTOT 158 01/03/2023 09:32 AM    LDL 94 01/03/2023 09:32 AM    HDL 46 01/03/2023 09:32 AM    TRIGLYCERIDE 92 01/03/2023 09:32 AM       Lab Results   Component Value Date/Time    SODIUM 139 01/03/2023 09:32 AM    POTASSIUM 4.5 01/03/2023 09:32 AM    CHLORIDE 104 01/03/2023 09:32 AM    CO2 26 01/03/2023 09:32 AM    GLUCOSE 91 01/03/2023 09:32 AM    BUN 20 01/03/2023 09:32 AM    CREATININE 1.12 01/03/2023 09:32 AM    CREATININE 1.15 12/09/2010 07:25 AM    BUNCREATRAT 16 12/09/2010 07:25 AM    GLOMRATE >59 12/09/2010 07:25 AM     Lab Results   Component Value Date/Time    ALKPHOSPHAT 55 01/03/2023 09:32 AM    ASTSGOT 30 01/03/2023 09:32 AM    ALTSGPT 22 01/03/2023 09:32 AM    TBILIRUBIN 0.7 01/03/2023 09:32 AM

## 2023-05-15 ENCOUNTER — HOSPITAL ENCOUNTER (OUTPATIENT)
Dept: RADIOLOGY | Facility: MEDICAL CENTER | Age: 78
End: 2023-05-15
Attending: INTERNAL MEDICINE
Payer: COMMERCIAL

## 2023-05-15 DIAGNOSIS — I70.0 ATHEROSCLEROSIS OF AORTA (HCC): ICD-10-CM

## 2023-05-15 DIAGNOSIS — I25.10 CORONARY ARTERY DISEASE INVOLVING NATIVE CORONARY ARTERY OF NATIVE HEART WITHOUT ANGINA PECTORIS: ICD-10-CM

## 2023-05-15 DIAGNOSIS — E78.5 DYSLIPIDEMIA: ICD-10-CM

## 2023-05-15 PROCEDURE — 4410556 CT-CARDIAC SCORING (SELF PAY ONLY)

## 2023-05-17 DIAGNOSIS — E78.5 DYSLIPIDEMIA: ICD-10-CM

## 2023-05-17 DIAGNOSIS — R93.1 ABNORMAL HEART SCORE CT: ICD-10-CM

## 2023-05-17 DIAGNOSIS — I25.10 CORONARY ARTERY DISEASE INVOLVING NATIVE CORONARY ARTERY OF NATIVE HEART WITHOUT ANGINA PECTORIS: ICD-10-CM

## 2023-05-17 RX ORDER — ATORVASTATIN CALCIUM 40 MG/1
40 TABLET, FILM COATED ORAL NIGHTLY
Qty: 90 TABLET | Refills: 3 | Status: SHIPPED | OUTPATIENT
Start: 2023-05-17 | End: 2024-01-04 | Stop reason: SDUPTHER

## 2023-05-18 ENCOUNTER — TELEPHONE (OUTPATIENT)
Dept: MEDICAL GROUP | Age: 78
End: 2023-05-18
Payer: MEDICARE

## 2023-05-18 NOTE — TELEPHONE ENCOUNTER
Left message to call back regarding pt's cardiac score and Dr. Mcfarlane's Lipitor increase. Left only a generic message to return call as pt's outgoing message didn't identify self.

## 2023-05-24 ENCOUNTER — PATIENT MESSAGE (OUTPATIENT)
Dept: HEALTH INFORMATION MANAGEMENT | Facility: OTHER | Age: 78
End: 2023-05-24

## 2023-06-19 ENCOUNTER — HOSPITAL ENCOUNTER (OUTPATIENT)
Dept: RADIOLOGY | Facility: MEDICAL CENTER | Age: 78
End: 2023-06-19
Attending: INTERNAL MEDICINE
Payer: MEDICARE

## 2023-06-19 ENCOUNTER — PATIENT MESSAGE (OUTPATIENT)
Dept: MEDICAL GROUP | Age: 78
End: 2023-06-19

## 2023-06-19 DIAGNOSIS — K76.0 FATTY LIVER: ICD-10-CM

## 2023-06-19 PROCEDURE — 76700 US EXAM ABDOM COMPLETE: CPT

## 2023-06-19 NOTE — PATIENT COMMUNICATION
I cannot find any lab results on this patient that were done today or recently.  If you can find them.  Please print them out for me to review and I will get back to him tomorrow.  Normally we will go over test results at their next scheduled appointment.  If he does not have one or wants to go over the sooner he should schedule appointment either virtually or in the office to go over those results.  I do so that the ultrasound that he had done today just showed fatty liver but no other significant abnormalities.  We can discuss this further at the next office visit.

## 2023-07-05 ENCOUNTER — HOSPITAL ENCOUNTER (OUTPATIENT)
Dept: CARDIOLOGY | Facility: MEDICAL CENTER | Age: 78
End: 2023-07-05
Attending: INTERNAL MEDICINE
Payer: MEDICARE

## 2023-07-05 DIAGNOSIS — I20.0 UNSTABLE ANGINA (HCC): ICD-10-CM

## 2023-07-05 DIAGNOSIS — R06.09 DOE (DYSPNEA ON EXERTION): ICD-10-CM

## 2023-07-05 DIAGNOSIS — R07.9 EXERTIONAL CHEST PAIN: ICD-10-CM

## 2023-07-05 LAB
LV EJECT FRACT  99904: 55
LV EJECT FRACT MOD 2C 99903: 52.32
LV EJECT FRACT MOD 4C 99902: 51.89
LV EJECT FRACT MOD BP 99901: 51.61

## 2023-07-05 PROCEDURE — 93306 TTE W/DOPPLER COMPLETE: CPT

## 2023-07-05 PROCEDURE — 93306 TTE W/DOPPLER COMPLETE: CPT | Mod: 26 | Performed by: INTERNAL MEDICINE

## 2023-07-06 ENCOUNTER — HOSPITAL ENCOUNTER (OUTPATIENT)
Dept: RADIOLOGY | Facility: MEDICAL CENTER | Age: 78
End: 2023-07-06
Attending: INTERNAL MEDICINE
Payer: MEDICARE

## 2023-07-06 DIAGNOSIS — R06.09 DOE (DYSPNEA ON EXERTION): ICD-10-CM

## 2023-07-06 DIAGNOSIS — I20.0 UNSTABLE ANGINA (HCC): ICD-10-CM

## 2023-07-06 DIAGNOSIS — R07.9 EXERTIONAL CHEST PAIN: ICD-10-CM

## 2023-07-06 PROCEDURE — 78452 HT MUSCLE IMAGE SPECT MULT: CPT | Mod: 26 | Performed by: INTERNAL MEDICINE

## 2023-07-06 PROCEDURE — 78452 HT MUSCLE IMAGE SPECT MULT: CPT

## 2023-07-06 PROCEDURE — 93018 CV STRESS TEST I&R ONLY: CPT | Performed by: INTERNAL MEDICINE

## 2023-08-08 ENCOUNTER — OFFICE VISIT (OUTPATIENT)
Dept: MEDICAL GROUP | Age: 78
End: 2023-08-08
Payer: MEDICARE

## 2023-08-08 VITALS
TEMPERATURE: 98.6 F | BODY MASS INDEX: 23.49 KG/M2 | HEART RATE: 64 BPM | WEIGHT: 155 LBS | OXYGEN SATURATION: 96 % | HEIGHT: 68 IN | SYSTOLIC BLOOD PRESSURE: 120 MMHG | DIASTOLIC BLOOD PRESSURE: 62 MMHG

## 2023-08-08 DIAGNOSIS — E78.5 DYSLIPIDEMIA: ICD-10-CM

## 2023-08-08 DIAGNOSIS — E55.9 VITAMIN D DEFICIENCY: ICD-10-CM

## 2023-08-08 DIAGNOSIS — Z71.84 TRAVEL ADVICE ENCOUNTER: ICD-10-CM

## 2023-08-08 DIAGNOSIS — R73.01 IFG (IMPAIRED FASTING GLUCOSE): ICD-10-CM

## 2023-08-08 DIAGNOSIS — I49.3 PVC (PREMATURE VENTRICULAR CONTRACTION): ICD-10-CM

## 2023-08-08 DIAGNOSIS — N40.0 BPH WITHOUT URINARY OBSTRUCTION: ICD-10-CM

## 2023-08-08 DIAGNOSIS — E03.4 HYPOTHYROIDISM DUE TO ACQUIRED ATROPHY OF THYROID: ICD-10-CM

## 2023-08-08 DIAGNOSIS — K76.0 FATTY LIVER: ICD-10-CM

## 2023-08-08 DIAGNOSIS — I70.0 ATHEROSCLEROSIS OF AORTA (HCC): ICD-10-CM

## 2023-08-08 PROBLEM — R06.09 DOE (DYSPNEA ON EXERTION): Status: RESOLVED | Noted: 2022-08-17 | Resolved: 2023-08-08

## 2023-08-08 PROCEDURE — 3078F DIAST BP <80 MM HG: CPT | Performed by: INTERNAL MEDICINE

## 2023-08-08 PROCEDURE — 99214 OFFICE O/P EST MOD 30 MIN: CPT | Performed by: INTERNAL MEDICINE

## 2023-08-08 PROCEDURE — 3074F SYST BP LT 130 MM HG: CPT | Performed by: INTERNAL MEDICINE

## 2023-08-08 RX ORDER — AMOXICILLIN 500 MG/1
500 CAPSULE ORAL 3 TIMES DAILY
Qty: 30 CAPSULE | Refills: 1 | Status: SHIPPED
Start: 2023-08-08 | End: 2023-09-20

## 2023-08-08 ASSESSMENT — ENCOUNTER SYMPTOMS
PSYCHIATRIC NEGATIVE: 1
MUSCULOSKELETAL NEGATIVE: 1
EYES NEGATIVE: 1
NEUROLOGICAL NEGATIVE: 1
CARDIOVASCULAR NEGATIVE: 1
GASTROINTESTINAL NEGATIVE: 1
CONSTITUTIONAL NEGATIVE: 1
RESPIRATORY NEGATIVE: 1

## 2023-08-08 ASSESSMENT — FIBROSIS 4 INDEX: FIB4 SCORE: 1.6

## 2023-08-08 NOTE — PROGRESS NOTES
Subjective     Dusty Eid is a 77 y.o. male who presents with Follow-Up (Echo cardiogram / cardiac stress test )  The patient is here for followup of chronic medical problems listed below. The patient is compliant with medications and having no side effects from them. Denies chest pain, abdominal pain, dyspnea, myalgias, or cough.     Patient Active Problem List   Diagnosis    Hypothyroidism due to acquired atrophy of thyroid    DDD (degenerative disc disease), cervical    Encounter for Department of Transportation (DOT) examination for driving license renewal    IFG (impaired fasting glucose)    Fatty liver    Other male erectile dysfunction    Epididymal cyst- left    Vitamin D deficiency    PVC (premature ventricular contraction)    Dyslipidemia    DJD of left shoulder    Risk for falls    Atherosclerosis of aorta (HCC)           Outpatient Medications Prior to Visit   Medication Sig Dispense Refill    atorvastatin (LIPITOR) 40 MG Tab Take 1 Tablet by mouth every evening. 90 Tablet 3    Plant Sterols and Stanols (CHOLESTOFF PLUS PO) Take  by mouth 2 times a day.      Flaxseed, Linseed, (FLAX SEED OIL PO) Take 1,400 mg by mouth every day.      Omega-3 Fatty Acids (FISH OIL) 1200 MG Cap Take  by mouth 2 times a day.      Ascorbic Acid (SUPER C COMPLEX PO) Take  by mouth every day.      Red Yeast Rice 600 MG Cap Take 600 mg by mouth 2 times a day.      Cyanocobalamin (VITAMIN B 12 PO) Take 1,000 mcg by mouth 2 times a day.      Zinc 50 MG Tab Take 50 mg by mouth every day.      Omeprazole Magnesium (PRILOSEC PO) Take  by mouth every day.      Turmeric (QC TUMERIC COMPLEX PO) Take  by mouth 2 times a day.      levothyroxine (SYNTHROID) 125 MCG Tab Take 1 Tablet by mouth every morning on an empty stomach. 90 Tablet 4    sildenafil citrate (VIAGRA) 100 MG tablet Take 1 Tablet by mouth as needed for Erectile Dysfunction. 90 Tablet 4     No facility-administered medications prior to visit.     No visits with  results within 1 Month(s) from this visit.   Latest known visit with results is:   Hospital Outpatient Visit on 07/05/2023   Component Date Value    Eject.Frac. MOD BP 07/05/2023 51.61     Eject.Frac. MOD 4C 07/05/2023 51.89     Eject.Frac. MOD 2C 07/05/2023 52.32     Left Ventrical Ejection * 07/05/2023 55       Lab Results   Component Value Date/Time    HBA1C 5.9 (H) 01/03/2023 09:32 AM    HBA1C 5.6 08/09/2022 06:45 AM     Lab Results   Component Value Date/Time    SODIUM 139 01/03/2023 09:32 AM    POTASSIUM 4.5 01/03/2023 09:32 AM    CHLORIDE 104 01/03/2023 09:32 AM    CO2 26 01/03/2023 09:32 AM    GLUCOSE 91 01/03/2023 09:32 AM    BUN 20 01/03/2023 09:32 AM    CREATININE 1.12 01/03/2023 09:32 AM    CREATININE 1.15 12/09/2010 07:25 AM    BUNCREATRAT 16 12/09/2010 07:25 AM    GLOMRATE >59 12/09/2010 07:25 AM    ALKPHOSPHAT 55 01/03/2023 09:32 AM    ASTSGOT 30 01/03/2023 09:32 AM    ALTSGPT 22 01/03/2023 09:32 AM    TBILIRUBIN 0.7 01/03/2023 09:32 AM     No results found for: INR  Lab Results   Component Value Date/Time    CHOLSTRLTOT 158 01/03/2023 09:32 AM    LDL 94 01/03/2023 09:32 AM    HDL 46 01/03/2023 09:32 AM    TRIGLYCERIDE 92 01/03/2023 09:32 AM       Lab Results   Component Value Date/Time    TESTOSTERONE 695 06/30/2014 10:29 AM     Lab Results   Component Value Date/Time    TSH 20.800 (H) 03/29/2011 11:33 AM    TSH 20.440 (H) 12/09/2010 07:25 AM     Lab Results   Component Value Date/Time    FREET4 1.59 08/09/2022 06:45 AM    FREET4 1.58 12/02/2020 11:49 AM     No results found for: URICACID  No components found for: VITB12  Lab Results   Component Value Date/Time    25HYDROXY 36 08/09/2022 06:45 AM    25HYDROXY 37 05/05/2022 08:38 AM   '           HPI    Review of Systems   Constitutional: Negative.    HENT: Negative.     Eyes: Negative.    Respiratory: Negative.     Cardiovascular: Negative.    Gastrointestinal: Negative.    Genitourinary: Negative.    Musculoskeletal: Negative.    Skin: Negative.   "  Neurological: Negative.    Endo/Heme/Allergies: Negative.    Psychiatric/Behavioral: Negative.                Objective     /62 (BP Location: Right arm, Patient Position: Sitting, BP Cuff Size: Adult)   Pulse 64   Temp 37 °C (98.6 °F) (Temporal)   Ht 1.727 m (5' 8\")   Wt 70.3 kg (155 lb)   SpO2 96%   BMI 23.57 kg/m²      Physical Exam  Constitutional:       General: He is not in acute distress.     Appearance: He is well-developed. He is not diaphoretic.   HENT:      Head: Normocephalic and atraumatic.      Right Ear: External ear normal.      Left Ear: External ear normal.      Nose: Nose normal.      Mouth/Throat:      Pharynx: No oropharyngeal exudate.   Eyes:      General: No scleral icterus.        Right eye: No discharge.         Left eye: No discharge.      Conjunctiva/sclera: Conjunctivae normal.      Pupils: Pupils are equal, round, and reactive to light.   Neck:      Thyroid: No thyromegaly.      Vascular: No JVD.      Trachea: No tracheal deviation.   Cardiovascular:      Rate and Rhythm: Normal rate and regular rhythm.      Heart sounds: Normal heart sounds. No murmur heard.     No friction rub. No gallop.   Pulmonary:      Effort: Pulmonary effort is normal. No respiratory distress.      Breath sounds: Normal breath sounds. No stridor. No wheezing or rales.   Chest:      Chest wall: No tenderness.   Abdominal:      General: Bowel sounds are normal. There is no distension.      Palpations: Abdomen is soft. There is no mass.      Tenderness: There is no abdominal tenderness. There is no guarding or rebound.   Musculoskeletal:         General: No tenderness. Normal range of motion.      Cervical back: Normal range of motion and neck supple.   Lymphadenopathy:      Cervical: No cervical adenopathy.   Skin:     General: Skin is warm and dry.      Coloration: Skin is not pale.      Findings: No erythema or rash.   Neurological:      Mental Status: He is alert and oriented to person, place, and " time.      Motor: No abnormal muscle tone.      Coordination: Coordination normal.      Deep Tendon Reflexes: Reflexes are normal and symmetric. Reflexes normal.   Psychiatric:         Behavior: Behavior normal.         Thought Content: Thought content normal.         Judgment: Judgment normal.                             Assessment & Plan        1. Atherosclerosis of aorta (HCC)  Under good control.  Continue with statin low-dose aspirin and BP control.  Atorvastatin 40 mg daily check level in 6 months.    2. Hypothyroidism due to acquired atrophy of thyroid  Under good control continue current regimen with Synthroid 125 mcg daily.    3. IFG (impaired fasting glucose)  Under good control with Mediterranean diet and exercise.    4. Dyslipidemia  Good control continue simvastatin 40 mg daily.  - CBC WITH DIFFERENTIAL; Future  - TSH; Future  - Lipid Profile; Future  - HEMOGLOBIN A1C; Future  - Comp Metabolic Panel; Future    5. Vitamin D deficiency  Good control continue vitamin D3 2000 units daily.  OTC.  - VITAMIN D,25 HYDROXY (DEFICIENCY); Future    6. Travel advice encounter  Patient is going on a trip and would like to take antibiotics with them which were prescribed.  - amoxicillin (AMOXIL) 500 MG Cap; Take 1 Capsule by mouth 3 times a day.  Dispense: 30 Capsule; Refill: 1    7. Fatty liver       Mediterranean diet and exercise weight reduction.    8. PVC (premature ventricular contraction)  Good control on current regimen.  No beta-blockers.  No recurrence of symptoms.    9. BPH without urinary obstruction  Good control continue serial PSAs for monitoring.  - PROSTATE SPECIFIC AG DIAGNOSTIC; Future

## 2023-10-23 ENCOUNTER — OFFICE VISIT (OUTPATIENT)
Dept: URGENT CARE | Facility: CLINIC | Age: 78
End: 2023-10-23
Payer: MEDICARE

## 2023-10-23 ENCOUNTER — APPOINTMENT (OUTPATIENT)
Dept: RADIOLOGY | Facility: IMAGING CENTER | Age: 78
End: 2023-10-23
Attending: FAMILY MEDICINE
Payer: MEDICARE

## 2023-10-23 VITALS
HEART RATE: 56 BPM | WEIGHT: 153 LBS | SYSTOLIC BLOOD PRESSURE: 112 MMHG | RESPIRATION RATE: 16 BRPM | HEIGHT: 68 IN | TEMPERATURE: 97.2 F | OXYGEN SATURATION: 98 % | DIASTOLIC BLOOD PRESSURE: 58 MMHG | BODY MASS INDEX: 23.19 KG/M2

## 2023-10-23 DIAGNOSIS — R05.2 SUBACUTE COUGH: ICD-10-CM

## 2023-10-23 PROCEDURE — 71046 X-RAY EXAM CHEST 2 VIEWS: CPT | Mod: TC,FY | Performed by: FAMILY MEDICINE

## 2023-10-23 PROCEDURE — 99213 OFFICE O/P EST LOW 20 MIN: CPT | Performed by: FAMILY MEDICINE

## 2023-10-23 PROCEDURE — 3074F SYST BP LT 130 MM HG: CPT | Performed by: FAMILY MEDICINE

## 2023-10-23 PROCEDURE — 3078F DIAST BP <80 MM HG: CPT | Performed by: FAMILY MEDICINE

## 2023-10-23 RX ORDER — METHYLPREDNISOLONE 4 MG/1
TABLET ORAL
Qty: 21 TABLET | Refills: 0 | Status: SHIPPED
Start: 2023-10-23 | End: 2023-11-07

## 2023-10-23 RX ORDER — PROMETHAZINE HYDROCHLORIDE AND CODEINE PHOSPHATE 6.25; 1 MG/5ML; MG/5ML
5 SYRUP ORAL 4 TIMES DAILY PRN
COMMUNITY
End: 2023-11-07

## 2023-10-23 ASSESSMENT — FIBROSIS 4 INDEX: FIB4 SCORE: 1.6

## 2023-10-23 NOTE — PROGRESS NOTES
"  Subjective:      77 y.o. male presents to urgent care for cough that started 9/23/2023.  The cough is persistent throughout the whole day and does not worsen after eating or laying down.  No associated heartburn. No associated fever, headache, or shortness of breath. No tobacco product use. No history of asthma or COPD.     He denies any other questions or concerns at this time.    Current problem list, medication, and past medical/surgical history were reviewed in Epic.    ROS  See HPI     Objective:      /58 (BP Location: Left arm, Patient Position: Sitting, BP Cuff Size: Adult)   Pulse (!) 56   Temp 36.2 °C (97.2 °F) (Temporal)   Resp 16   Ht 1.727 m (5' 8\")   Wt 69.4 kg (153 lb)   SpO2 98%   BMI 23.26 kg/m²     Physical Exam  Constitutional:       General: He is not in acute distress.     Appearance: He is not diaphoretic.   Cardiovascular:      Rate and Rhythm: Regular rhythm. Bradycardia present.      Heart sounds: Normal heart sounds.   Pulmonary:      Effort: Pulmonary effort is normal. No respiratory distress.      Breath sounds: Normal breath sounds.   Neurological:      Mental Status: He is alert.   Psychiatric:         Mood and Affect: Affect normal.         Judgment: Judgment normal.       Assessment/Plan:     1. Subacute cough  CXR showing no acute cardiopulmonary processes.  Prescription for Medrol Dosepak has been sent.  - DX-CHEST-2 VIEWS; Future  - methylPREDNISolone (MEDROL DOSEPAK) 4 MG Tablet Therapy Pack; Follow schedule on package instructions.  Dispense: 21 Tablet; Refill: 0      Instructed to return to Urgent Care or nearest Emergency Department if symptoms fail to improve, for any change in condition, further concerns, or new concerning symptoms. Patient states understanding of the plan of care and discharge instructions.    Sofia Mcdonnell M.D.   "

## 2023-11-07 ENCOUNTER — OFFICE VISIT (OUTPATIENT)
Dept: MEDICAL GROUP | Age: 78
End: 2023-11-07
Payer: MEDICARE

## 2023-11-07 VITALS
DIASTOLIC BLOOD PRESSURE: 80 MMHG | SYSTOLIC BLOOD PRESSURE: 118 MMHG | WEIGHT: 148 LBS | TEMPERATURE: 97.8 F | HEIGHT: 68 IN | BODY MASS INDEX: 22.43 KG/M2 | OXYGEN SATURATION: 96 % | HEART RATE: 69 BPM

## 2023-11-07 DIAGNOSIS — R73.01 IFG (IMPAIRED FASTING GLUCOSE): ICD-10-CM

## 2023-11-07 DIAGNOSIS — I70.0 ATHEROSCLEROSIS OF AORTA (HCC): ICD-10-CM

## 2023-11-07 DIAGNOSIS — J30.1 NON-SEASONAL ALLERGIC RHINITIS DUE TO POLLEN: ICD-10-CM

## 2023-11-07 DIAGNOSIS — E03.4 HYPOTHYROIDISM DUE TO ACQUIRED ATROPHY OF THYROID: ICD-10-CM

## 2023-11-07 DIAGNOSIS — E78.5 DYSLIPIDEMIA: ICD-10-CM

## 2023-11-07 DIAGNOSIS — E55.9 VITAMIN D DEFICIENCY: ICD-10-CM

## 2023-11-07 PROBLEM — Z02.4 ENCOUNTER FOR DEPARTMENT OF TRANSPORTATION (DOT) EXAMINATION FOR DRIVING LICENSE RENEWAL: Status: RESOLVED | Noted: 2017-03-29 | Resolved: 2023-11-07

## 2023-11-07 PROCEDURE — 99214 OFFICE O/P EST MOD 30 MIN: CPT | Performed by: INTERNAL MEDICINE

## 2023-11-07 PROCEDURE — 3079F DIAST BP 80-89 MM HG: CPT | Performed by: INTERNAL MEDICINE

## 2023-11-07 PROCEDURE — 3074F SYST BP LT 130 MM HG: CPT | Performed by: INTERNAL MEDICINE

## 2023-11-07 RX ORDER — AZELASTINE 1 MG/ML
1 SPRAY, METERED NASAL 2 TIMES DAILY
Qty: 30 ML | Refills: 2 | Status: SHIPPED | OUTPATIENT
Start: 2023-11-07 | End: 2024-01-04 | Stop reason: SDUPTHER

## 2023-11-07 RX ORDER — FLUTICASONE PROPIONATE 50 MCG
1 SPRAY, SUSPENSION (ML) NASAL 2 TIMES DAILY
Qty: 16 G | Refills: 3 | Status: SHIPPED | OUTPATIENT
Start: 2023-11-07 | End: 2024-01-04 | Stop reason: SDUPTHER

## 2023-11-07 RX ORDER — CETIRIZINE HYDROCHLORIDE 10 MG/1
10 TABLET ORAL DAILY
Qty: 30 TABLET | Refills: 3 | Status: SHIPPED | OUTPATIENT
Start: 2023-11-07 | End: 2024-01-04 | Stop reason: SDUPTHER

## 2023-11-07 ASSESSMENT — ENCOUNTER SYMPTOMS
EYES NEGATIVE: 1
CARDIOVASCULAR NEGATIVE: 1
NEUROLOGICAL NEGATIVE: 1
MUSCULOSKELETAL NEGATIVE: 1
GASTROINTESTINAL NEGATIVE: 1
RESPIRATORY NEGATIVE: 1
PSYCHIATRIC NEGATIVE: 1
CONSTITUTIONAL NEGATIVE: 1

## 2023-11-07 ASSESSMENT — FIBROSIS 4 INDEX: FIB4 SCORE: 1.6

## 2023-11-08 NOTE — PROGRESS NOTES
"Subjective     Dusty Eid is a 77 y.o. male who presents with Cough (X over 1 month)  This is been nonproductive without fever chills or chest pain.  Slightly better with the Medrol Dosepak he got from urgent care a week ago.  No wheezes or shortness of breath.  Admits to nasal drip and nasal congestion for the last month since [place] from [place].  Chest x-ray in urgent care was negative.          HPI    Review of Systems   Constitutional: Negative.    HENT: Negative.     Eyes: Negative.    Respiratory: Negative.     Cardiovascular: Negative.    Gastrointestinal: Negative.    Genitourinary: Negative.    Musculoskeletal: Negative.    Skin: Negative.    Neurological: Negative.    Endo/Heme/Allergies: Negative.    Psychiatric/Behavioral: Negative.                Objective     /80 (BP Location: Right arm, Patient Position: Sitting, BP Cuff Size: Adult)   Pulse 69   Temp 36.6 °C (97.8 °F) (Temporal)   Ht 1.727 m (5' 8\")   Wt 67.1 kg (148 lb)   SpO2 96%   BMI 22.50 kg/m²      Physical Exam  Constitutional:       General: He is not in acute distress.     Appearance: He is well-developed. He is not diaphoretic.   HENT:      Head: Normocephalic and atraumatic.      Right Ear: External ear normal.      Left Ear: External ear normal.      Nose: Congestion and rhinorrhea present.      Mouth/Throat:      Pharynx: No oropharyngeal exudate.   Eyes:      General: No scleral icterus.        Right eye: No discharge.         Left eye: No discharge.      Conjunctiva/sclera: Conjunctivae normal.      Pupils: Pupils are equal, round, and reactive to light.   Neck:      Thyroid: No thyromegaly.      Vascular: No JVD.      Trachea: No tracheal deviation.   Cardiovascular:      Rate and Rhythm: Normal rate and regular rhythm.      Heart sounds: Normal heart sounds. No murmur heard.     No friction rub. No gallop.   Pulmonary:      Effort: Pulmonary effort is normal. No respiratory distress.      Breath " sounds: Normal breath sounds. No stridor. No wheezing or rales.   Chest:      Chest wall: No tenderness.   Abdominal:      General: Bowel sounds are normal. There is no distension.      Palpations: Abdomen is soft. There is no mass.      Tenderness: There is no abdominal tenderness. There is no guarding or rebound.   Musculoskeletal:         General: No tenderness. Normal range of motion.      Cervical back: Normal range of motion and neck supple.   Lymphadenopathy:      Cervical: No cervical adenopathy.   Skin:     General: Skin is warm and dry.      Coloration: Skin is not pale.      Findings: No erythema or rash.   Neurological:      Mental Status: He is alert and oriented to person, place, and time.      Motor: No abnormal muscle tone.      Coordination: Coordination normal.      Deep Tendon Reflexes: Reflexes are normal and symmetric. Reflexes normal.   Psychiatric:         Behavior: Behavior normal.         Thought Content: Thought content normal.         Judgment: Judgment normal.       No visits with results within 1 Month(s) from this visit.   Latest known visit with results is:   Hospital Outpatient Visit on 07/05/2023   Component Date Value    Eject.Advebsc. MOD BP 07/05/2023 51.61     Eject.Frac. MOD 4C 07/05/2023 51.89     Eject.Advebsc. MOD 2C 07/05/2023 52.32     Left Ventrical Ejection * 07/05/2023 55       Lab Results   Component Value Date/Time    HBA1C 5.9 (H) 01/03/2023 09:32 AM    HBA1C 5.6 08/09/2022 06:45 AM     Lab Results   Component Value Date/Time    SODIUM 139 01/03/2023 09:32 AM    POTASSIUM 4.5 01/03/2023 09:32 AM    CHLORIDE 104 01/03/2023 09:32 AM    CO2 26 01/03/2023 09:32 AM    GLUCOSE 91 01/03/2023 09:32 AM    BUN 20 01/03/2023 09:32 AM    CREATININE 1.12 01/03/2023 09:32 AM    CREATININE 1.15 12/09/2010 07:25 AM    BUNCREATRAT 16 12/09/2010 07:25 AM    GLOMRATE >59 12/09/2010 07:25 AM    ALKPHOSPHAT 55 01/03/2023 09:32 AM    ASTSGOT 30 01/03/2023 09:32 AM    ALTSGPT 22 01/03/2023 09:32  "AM    TBILIRUBIN 0.7 01/03/2023 09:32 AM     No results found for: \"INR\"  Lab Results   Component Value Date/Time    CHOLSTRLTOT 158 01/03/2023 09:32 AM    LDL 94 01/03/2023 09:32 AM    HDL 46 01/03/2023 09:32 AM    TRIGLYCERIDE 92 01/03/2023 09:32 AM       Lab Results   Component Value Date/Time    TESTOSTERONE 695 06/30/2014 10:29 AM     Lab Results   Component Value Date/Time    TSH 20.800 (H) 03/29/2011 11:33 AM    TSH 20.440 (H) 12/09/2010 07:25 AM     Lab Results   Component Value Date/Time    FREET4 1.59 08/09/2022 06:45 AM    FREET4 1.58 12/02/2020 11:49 AM     No results found for: \"URICACID\"  No components found for: \"VITB12\"  Lab Results   Component Value Date/Time    25HYDROXY 36 08/09/2022 06:45 AM    25HYDROXY 37 05/05/2022 08:38 AM                           Assessment & Plan         1. Non-seasonal allergic rhinitis due to pollen  I suspect a prolonged seasonal allergic rhinitis or perhaps a prolonged viral URI.  Patient's had symptoms for a month which are slightly better and is out of the window for treating any antibody viral medications and does not appear to have any acute bacterial infection.  Trial of Zyrtec and Astelin nasal spray and Flonase nasal spray and refer to ENT if no improvement in 2 weeks.  - Referral to ENT  - cetirizine (ZYRTEC) 10 MG Tab; Take 1 Tablet by mouth every day.  Dispense: 30 Tablet; Refill: 3    2. Atherosclerosis of aorta (HCC)  Stable under current regimen.  Continue current regimen with atorvastatin 40 mg daily.    3. Dyslipidemia  As above per #2    4. Hypothyroidism due to acquired atrophy of thyroid  Good control continue current regimen.  Synthroid 125 mcg daily.    5. IFG (impaired fasting glucose)  Good control continue Mediterranean diet and exercise.    6. Vitamin D deficiency  Good control continue vitamin D3 2000 units daily.                  "

## 2023-12-27 ENCOUNTER — HOSPITAL ENCOUNTER (OUTPATIENT)
Dept: LAB | Facility: MEDICAL CENTER | Age: 78
End: 2023-12-27
Attending: INTERNAL MEDICINE
Payer: MEDICARE

## 2023-12-27 DIAGNOSIS — E78.5 DYSLIPIDEMIA: ICD-10-CM

## 2023-12-27 DIAGNOSIS — E55.9 VITAMIN D DEFICIENCY: ICD-10-CM

## 2023-12-27 DIAGNOSIS — N40.0 BPH WITHOUT URINARY OBSTRUCTION: ICD-10-CM

## 2023-12-27 LAB
25(OH)D3 SERPL-MCNC: 32 NG/ML (ref 30–100)
ALBUMIN SERPL BCP-MCNC: 4.1 G/DL (ref 3.2–4.9)
ALBUMIN/GLOB SERPL: 1.4 G/DL
ALP SERPL-CCNC: 66 U/L (ref 30–99)
ALT SERPL-CCNC: 21 U/L (ref 2–50)
ANION GAP SERPL CALC-SCNC: 12 MMOL/L (ref 7–16)
AST SERPL-CCNC: 25 U/L (ref 12–45)
BASOPHILS # BLD AUTO: 0.5 % (ref 0–1.8)
BASOPHILS # BLD: 0.04 K/UL (ref 0–0.12)
BILIRUB SERPL-MCNC: 0.7 MG/DL (ref 0.1–1.5)
BUN SERPL-MCNC: 14 MG/DL (ref 8–22)
CALCIUM ALBUM COR SERPL-MCNC: 9.3 MG/DL (ref 8.5–10.5)
CALCIUM SERPL-MCNC: 9.4 MG/DL (ref 8.5–10.5)
CHLORIDE SERPL-SCNC: 107 MMOL/L (ref 96–112)
CHOLEST SERPL-MCNC: 153 MG/DL (ref 100–199)
CO2 SERPL-SCNC: 24 MMOL/L (ref 20–33)
CREAT SERPL-MCNC: 0.87 MG/DL (ref 0.5–1.4)
EOSINOPHIL # BLD AUTO: 0.17 K/UL (ref 0–0.51)
EOSINOPHIL NFR BLD: 2.1 % (ref 0–6.9)
ERYTHROCYTE [DISTWIDTH] IN BLOOD BY AUTOMATED COUNT: 45.5 FL (ref 35.9–50)
EST. AVERAGE GLUCOSE BLD GHB EST-MCNC: 123 MG/DL
FASTING STATUS PATIENT QL REPORTED: NORMAL
GFR SERPLBLD CREATININE-BSD FMLA CKD-EPI: 88 ML/MIN/1.73 M 2
GLOBULIN SER CALC-MCNC: 2.9 G/DL (ref 1.9–3.5)
GLUCOSE SERPL-MCNC: 100 MG/DL (ref 65–99)
HBA1C MFR BLD: 5.9 % (ref 4–5.6)
HCT VFR BLD AUTO: 45.1 % (ref 42–52)
HDLC SERPL-MCNC: 53 MG/DL
HGB BLD-MCNC: 15.2 G/DL (ref 14–18)
IMM GRANULOCYTES # BLD AUTO: 0.01 K/UL (ref 0–0.11)
IMM GRANULOCYTES NFR BLD AUTO: 0.1 % (ref 0–0.9)
LDLC SERPL CALC-MCNC: 86 MG/DL
LYMPHOCYTES # BLD AUTO: 1.61 K/UL (ref 1–4.8)
LYMPHOCYTES NFR BLD: 20.2 % (ref 22–41)
MCH RBC QN AUTO: 31.6 PG (ref 27–33)
MCHC RBC AUTO-ENTMCNC: 33.7 G/DL (ref 32.3–36.5)
MCV RBC AUTO: 93.8 FL (ref 81.4–97.8)
MONOCYTES # BLD AUTO: 0.81 K/UL (ref 0–0.85)
MONOCYTES NFR BLD AUTO: 10.2 % (ref 0–13.4)
NEUTROPHILS # BLD AUTO: 5.33 K/UL (ref 1.82–7.42)
NEUTROPHILS NFR BLD: 66.9 % (ref 44–72)
NRBC # BLD AUTO: 0 K/UL
NRBC BLD-RTO: 0 /100 WBC (ref 0–0.2)
PLATELET # BLD AUTO: 292 K/UL (ref 164–446)
PMV BLD AUTO: 10.1 FL (ref 9–12.9)
POTASSIUM SERPL-SCNC: 4.1 MMOL/L (ref 3.6–5.5)
PROT SERPL-MCNC: 7 G/DL (ref 6–8.2)
PSA SERPL-MCNC: 1.61 NG/ML (ref 0–4)
RBC # BLD AUTO: 4.81 M/UL (ref 4.7–6.1)
SODIUM SERPL-SCNC: 143 MMOL/L (ref 135–145)
TRIGL SERPL-MCNC: 71 MG/DL (ref 0–149)
TSH SERPL DL<=0.005 MIU/L-ACNC: 0.34 UIU/ML (ref 0.38–5.33)
WBC # BLD AUTO: 8 K/UL (ref 4.8–10.8)

## 2023-12-27 PROCEDURE — 83036 HEMOGLOBIN GLYCOSYLATED A1C: CPT | Mod: GA

## 2023-12-27 PROCEDURE — 82306 VITAMIN D 25 HYDROXY: CPT

## 2023-12-27 PROCEDURE — 80061 LIPID PANEL: CPT

## 2023-12-27 PROCEDURE — 85025 COMPLETE CBC W/AUTO DIFF WBC: CPT

## 2023-12-27 PROCEDURE — 84153 ASSAY OF PSA TOTAL: CPT

## 2023-12-27 PROCEDURE — 36415 COLL VENOUS BLD VENIPUNCTURE: CPT

## 2023-12-27 PROCEDURE — 84443 ASSAY THYROID STIM HORMONE: CPT

## 2023-12-27 PROCEDURE — 80053 COMPREHEN METABOLIC PANEL: CPT

## 2024-01-04 ENCOUNTER — OFFICE VISIT (OUTPATIENT)
Dept: MEDICAL GROUP | Age: 79
End: 2024-01-04
Payer: MEDICARE

## 2024-01-04 VITALS
SYSTOLIC BLOOD PRESSURE: 110 MMHG | TEMPERATURE: 98.7 F | HEART RATE: 50 BPM | DIASTOLIC BLOOD PRESSURE: 68 MMHG | BODY MASS INDEX: 23.19 KG/M2 | OXYGEN SATURATION: 98 % | WEIGHT: 153 LBS | HEIGHT: 68 IN

## 2024-01-04 DIAGNOSIS — E53.8 VITAMIN B 12 DEFICIENCY: ICD-10-CM

## 2024-01-04 DIAGNOSIS — E78.5 DYSLIPIDEMIA: ICD-10-CM

## 2024-01-04 DIAGNOSIS — Z23 NEED FOR VACCINATION: ICD-10-CM

## 2024-01-04 DIAGNOSIS — N40.0 BPH WITHOUT URINARY OBSTRUCTION: ICD-10-CM

## 2024-01-04 DIAGNOSIS — J30.1 NON-SEASONAL ALLERGIC RHINITIS DUE TO POLLEN: ICD-10-CM

## 2024-01-04 DIAGNOSIS — R73.01 IFG (IMPAIRED FASTING GLUCOSE): ICD-10-CM

## 2024-01-04 DIAGNOSIS — E03.4 HYPOTHYROIDISM DUE TO ACQUIRED ATROPHY OF THYROID: ICD-10-CM

## 2024-01-04 DIAGNOSIS — R93.1 ABNORMAL HEART SCORE CT: ICD-10-CM

## 2024-01-04 DIAGNOSIS — E55.9 VITAMIN D DEFICIENCY: ICD-10-CM

## 2024-01-04 DIAGNOSIS — I25.10 CORONARY ARTERY DISEASE INVOLVING NATIVE CORONARY ARTERY OF NATIVE HEART WITHOUT ANGINA PECTORIS: ICD-10-CM

## 2024-01-04 DIAGNOSIS — I70.0 ATHEROSCLEROSIS OF AORTA (HCC): ICD-10-CM

## 2024-01-04 DIAGNOSIS — Z91.81 RISK FOR FALLS: ICD-10-CM

## 2024-01-04 PROBLEM — N50.3 EPIDIDYMAL CYST: Status: RESOLVED | Noted: 2019-06-11 | Resolved: 2024-01-04

## 2024-01-04 PROBLEM — M19.012 DJD OF LEFT SHOULDER: Status: RESOLVED | Noted: 2023-05-03 | Resolved: 2024-01-04

## 2024-01-04 PROBLEM — I49.3 PVC (PREMATURE VENTRICULAR CONTRACTION): Status: RESOLVED | Noted: 2022-11-10 | Resolved: 2024-01-04

## 2024-01-04 PROCEDURE — 3074F SYST BP LT 130 MM HG: CPT | Performed by: INTERNAL MEDICINE

## 2024-01-04 PROCEDURE — 90662 IIV NO PRSV INCREASED AG IM: CPT | Performed by: INTERNAL MEDICINE

## 2024-01-04 PROCEDURE — 99214 OFFICE O/P EST MOD 30 MIN: CPT | Mod: 25 | Performed by: INTERNAL MEDICINE

## 2024-01-04 PROCEDURE — 90472 IMMUNIZATION ADMIN EACH ADD: CPT | Performed by: INTERNAL MEDICINE

## 2024-01-04 PROCEDURE — 90715 TDAP VACCINE 7 YRS/> IM: CPT | Performed by: INTERNAL MEDICINE

## 2024-01-04 PROCEDURE — G0008 ADMIN INFLUENZA VIRUS VAC: HCPCS | Performed by: INTERNAL MEDICINE

## 2024-01-04 PROCEDURE — 3078F DIAST BP <80 MM HG: CPT | Performed by: INTERNAL MEDICINE

## 2024-01-04 RX ORDER — CYANOCOBALAMIN (VITAMIN B-12) 500 MCG
500 TABLET ORAL
Qty: 100 TABLET | Refills: 3 | Status: SHIPPED | OUTPATIENT
Start: 2024-01-04

## 2024-01-04 RX ORDER — LEVOTHYROXINE SODIUM 0.12 MG/1
125 TABLET ORAL
Qty: 90 TABLET | Refills: 4 | Status: SHIPPED
Start: 2024-01-04 | End: 2024-01-04

## 2024-01-04 RX ORDER — CETIRIZINE HYDROCHLORIDE 10 MG/1
10 TABLET ORAL DAILY
Qty: 30 TABLET | Refills: 3 | Status: SHIPPED | OUTPATIENT
Start: 2024-01-04

## 2024-01-04 RX ORDER — AZELASTINE 1 MG/ML
1 SPRAY, METERED NASAL 2 TIMES DAILY
Qty: 30 ML | Refills: 2 | Status: SHIPPED | OUTPATIENT
Start: 2024-01-04

## 2024-01-04 RX ORDER — ATORVASTATIN CALCIUM 40 MG/1
40 TABLET, FILM COATED ORAL NIGHTLY
Qty: 90 TABLET | Refills: 3 | Status: SHIPPED | OUTPATIENT
Start: 2024-01-04

## 2024-01-04 RX ORDER — FLUTICASONE PROPIONATE 50 MCG
1 SPRAY, SUSPENSION (ML) NASAL 2 TIMES DAILY
Qty: 16 G | Refills: 3 | Status: SHIPPED | OUTPATIENT
Start: 2024-01-04

## 2024-01-04 RX ORDER — LEVOTHYROXINE SODIUM 112 UG/1
112 TABLET ORAL
Qty: 90 TABLET | Refills: 3 | Status: SHIPPED | OUTPATIENT
Start: 2024-01-04

## 2024-01-04 RX ORDER — CYANOCOBALAMIN (VITAMIN B-12) 500 MCG
1000 TABLET ORAL
Qty: 200 TABLET | Refills: 3 | Status: SHIPPED | OUTPATIENT
Start: 2024-01-04 | End: 2024-01-04 | Stop reason: SDUPTHER

## 2024-01-04 ASSESSMENT — ENCOUNTER SYMPTOMS
GASTROINTESTINAL NEGATIVE: 1
MUSCULOSKELETAL NEGATIVE: 1
RESPIRATORY NEGATIVE: 1
CONSTITUTIONAL NEGATIVE: 1
NEUROLOGICAL NEGATIVE: 1
PSYCHIATRIC NEGATIVE: 1
CARDIOVASCULAR NEGATIVE: 1
EYES NEGATIVE: 1

## 2024-01-04 ASSESSMENT — FIBROSIS 4 INDEX: FIB4 SCORE: 1.46

## 2024-01-04 ASSESSMENT — PATIENT HEALTH QUESTIONNAIRE - PHQ9: CLINICAL INTERPRETATION OF PHQ2 SCORE: 0

## 2024-01-04 NOTE — PROGRESS NOTES
Subjective     Dusty Eid is a 78 y.o. male who presents with Follow-Up (Lab review )  The patient is here for followup of chronic medical problems listed below. The patient is compliant with medications and having no side effects from them. Denies chest pain, abdominal pain, dyspnea, myalgias, or cough. \    Since last visit patient's been doing well with no chest pain PND orthopnea palpitations syncope or presyncope or dizziness.  Workup for chest pain and dizziness 2 years ago and again 1 year ago were unrevealing although he did not have cardiac angiography.  He did have nuclear medicine cardiac stress test and cardiac PET CT which was unremarkable, a year apart.  Holter monitoring for 2 weeks however did show sinus bradycardia with frequent unifocal PVCs.  No runs.    Patient was concerned that his previous COVID vaccinations contributing to the symptoms but I reassured him they were not as they were not temporally related after extensive review of his chart.    Patient is also concerned about 20% lymphocyte count which I reassured him was normal medically in view of the rest of his differential being normal and white count being normal.    However review of his labs does show that his TSH is slightly suppressed 125 mcg of Synthroid daily.  He was told that we will need to reduce that dosage to 112 mcg a day.  He was also getting excess amount of B12 and I told reduce his dosage to just 1 B12 pill a day of the 500 mcg strength.    Of greater concern is his high coronary calcification score over 400 for which aggressive treatment of dyslipidemia should be continued and is currently on high intensity statin with Lipitor 40 mg a day.  This could be increased but we will leave it where it is at this time since he is asymptomatic and does not have documentation of any coronary disease.    Nevertheless he may be at risk for eventual development of significant bradycardia arrhythmia and should be on the look  out for any syncopal episodes since his resting heart rates consistently in the 50s and the Holter monitor 2-week event showed it got as low as 40s.    .        Patient Active Problem List   Diagnosis    Hypothyroidism due to acquired atrophy of thyroid    IFG (impaired fasting glucose)    Fatty liver    Other male erectile dysfunction    Vitamin D deficiency    Sinus bradycardia    Dyslipidemia    Risk for falls    Atherosclerosis of aorta (HCC)    Abnormal Heart Score CT     Outpatient Medications Prior to Visit   Medication Sig Dispense Refill    Throat Lozenges (COUGH DROPS MT) Use  in the mouth or throat.      Plant Sterols and Stanols (CHOLESTOFF PLUS PO) Take  by mouth 2 times a day.      Flaxseed, Linseed, (FLAX SEED OIL PO) Take 1,400 mg by mouth every day.      Omega-3 Fatty Acids (FISH OIL) 1200 MG Cap Take  by mouth 2 times a day.      Ascorbic Acid (SUPER C COMPLEX PO) Take  by mouth every day.      Red Yeast Rice 600 MG Cap Take 600 mg by mouth 2 times a day.      Zinc 50 MG Tab Take 50 mg by mouth every day.      Omeprazole Magnesium (PRILOSEC PO) Take  by mouth every day.      Turmeric (QC TUMERIC COMPLEX PO) Take  by mouth 2 times a day.      sildenafil citrate (VIAGRA) 100 MG tablet Take 1 Tablet by mouth as needed for Erectile Dysfunction. 90 Tablet 4    azelastine (ASTELIN) 137 MCG/SPRAY nasal spray Administer 1 Spray into affected nostril(S) 2 times a day. 30 mL 2    fluticasone (FLONASE) 50 MCG/ACT nasal spray Administer 1 Spray into affected nostril(S) 2 times a day. 16 g 3    cetirizine (ZYRTEC) 10 MG Tab Take 1 Tablet by mouth every day. 30 Tablet 3    atorvastatin (LIPITOR) 40 MG Tab Take 1 Tablet by mouth every evening. 90 Tablet 3    Cyanocobalamin (VITAMIN B 12 PO) Take 1,000 mcg by mouth 2 times a day.      levothyroxine (SYNTHROID) 125 MCG Tab Take 1 Tablet by mouth every morning on an empty stomach. 90 Tablet 4     No facility-administered medications prior to visit.     Lab Results  "  Component Value Date/Time    HBA1C 5.9 (H) 12/27/2023 10:53 AM    HBA1C 5.9 (H) 01/03/2023 09:32 AM     Lab Results   Component Value Date/Time    SODIUM 143 12/27/2023 10:53 AM    POTASSIUM 4.1 12/27/2023 10:53 AM    CHLORIDE 107 12/27/2023 10:53 AM    CO2 24 12/27/2023 10:53 AM    GLUCOSE 100 (H) 12/27/2023 10:53 AM    BUN 14 12/27/2023 10:53 AM    CREATININE 0.87 12/27/2023 10:53 AM    CREATININE 1.15 12/09/2010 07:25 AM    BUNCREATRAT 16 12/09/2010 07:25 AM    GLOMRATE >59 12/09/2010 07:25 AM    ALKPHOSPHAT 66 12/27/2023 10:53 AM    ASTSGOT 25 12/27/2023 10:53 AM    ALTSGPT 21 12/27/2023 10:53 AM    TBILIRUBIN 0.7 12/27/2023 10:53 AM     No results found for: \"INR\"  Lab Results   Component Value Date/Time    CHOLSTRLTOT 153 12/27/2023 10:53 AM    LDL 86 12/27/2023 10:53 AM    HDL 53 12/27/2023 10:53 AM    TRIGLYCERIDE 71 12/27/2023 10:53 AM       Lab Results   Component Value Date/Time    TESTOSTERONE 695 06/30/2014 10:29 AM     Lab Results   Component Value Date/Time    TSH 20.800 (H) 03/29/2011 11:33 AM    TSH 20.440 (H) 12/09/2010 07:25 AM     Lab Results   Component Value Date/Time    FREET4 1.59 08/09/2022 06:45 AM    FREET4 1.58 12/02/2020 11:49 AM     No results found for: \"URICACID\"  No components found for: \"VITB12\"  Lab Results   Component Value Date/Time    25HYDROXY 32 12/27/2023 10:53 AM    25HYDROXY 36 08/09/2022 06:45 AM     Hospital Outpatient Visit on 12/27/2023   Component Date Value    Prostatic Specific Antig* 12/27/2023 1.61     25-Hydroxy   Vitamin D 25 12/27/2023 32     Sodium 12/27/2023 143     Potassium 12/27/2023 4.1     Chloride 12/27/2023 107     Co2 12/27/2023 24     Anion Gap 12/27/2023 12.0     Glucose 12/27/2023 100 (H)     Bun 12/27/2023 14     Creatinine 12/27/2023 0.87     Calcium 12/27/2023 9.4     Correct Calcium 12/27/2023 9.3     AST(SGOT) 12/27/2023 25     ALT(SGPT) 12/27/2023 21     Alkaline Phosphatase 12/27/2023 66     Total Bilirubin 12/27/2023 0.7     Albumin " "12/27/2023 4.1     Total Protein 12/27/2023 7.0     Globulin 12/27/2023 2.9     A-G Ratio 12/27/2023 1.4     Glycohemoglobin 12/27/2023 5.9 (H)     Est Avg Glucose 12/27/2023 123     Cholesterol,Tot 12/27/2023 153     Triglycerides 12/27/2023 71     HDL 12/27/2023 53     LDL 12/27/2023 86     TSH 12/27/2023 0.340 (L)     WBC 12/27/2023 8.0     RBC 12/27/2023 4.81     Hemoglobin 12/27/2023 15.2     Hematocrit 12/27/2023 45.1     MCV 12/27/2023 93.8     MCH 12/27/2023 31.6     MCHC 12/27/2023 33.7     RDW 12/27/2023 45.5     Platelet Count 12/27/2023 292     MPV 12/27/2023 10.1     Neutrophils-Polys 12/27/2023 66.90     Lymphocytes 12/27/2023 20.20 (L)     Monocytes 12/27/2023 10.20     Eosinophils 12/27/2023 2.10     Basophils 12/27/2023 0.50     Immature Granulocytes 12/27/2023 0.10     Nucleated RBC 12/27/2023 0.00     Neutrophils (Absolute) 12/27/2023 5.33     Lymphs (Absolute) 12/27/2023 1.61     Monos (Absolute) 12/27/2023 0.81     Eos (Absolute) 12/27/2023 0.17     Baso (Absolute) 12/27/2023 0.04     Immature Granulocytes (a* 12/27/2023 0.01     NRBC (Absolute) 12/27/2023 0.00     Fasting Status 12/27/2023 Fasting     GFR (CKD-EPI) 12/27/2023 88                 HPI    Review of Systems   Constitutional: Negative.    HENT: Negative.     Eyes: Negative.    Respiratory: Negative.     Cardiovascular: Negative.    Gastrointestinal: Negative.    Genitourinary: Negative.    Musculoskeletal: Negative.    Skin: Negative.    Neurological: Negative.    Endo/Heme/Allergies: Negative.    Psychiatric/Behavioral: Negative.                Objective     /68 (BP Location: Right arm, Patient Position: Sitting, BP Cuff Size: Adult)   Pulse (!) 50   Temp 37.1 °C (98.7 °F) (Temporal)   Ht 1.727 m (5' 8\")   Wt 69.4 kg (153 lb)   SpO2 98%   BMI 23.26 kg/m²      Physical Exam  Constitutional:       General: He is not in acute distress.     Appearance: He is well-developed. He is not diaphoretic.   HENT:      Head: " Normocephalic and atraumatic.      Right Ear: External ear normal.      Left Ear: External ear normal.      Nose: Nose normal.      Mouth/Throat:      Pharynx: No oropharyngeal exudate.   Eyes:      General: No scleral icterus.        Right eye: No discharge.         Left eye: No discharge.      Conjunctiva/sclera: Conjunctivae normal.      Pupils: Pupils are equal, round, and reactive to light.   Neck:      Thyroid: No thyromegaly.      Vascular: No JVD.      Trachea: No tracheal deviation.   Cardiovascular:      Rate and Rhythm: Normal rate and regular rhythm.      Heart sounds: Normal heart sounds. No murmur heard.     No friction rub. No gallop.   Pulmonary:      Effort: Pulmonary effort is normal. No respiratory distress.      Breath sounds: Normal breath sounds. No stridor. No wheezing or rales.   Chest:      Chest wall: No tenderness.   Abdominal:      General: Bowel sounds are normal. There is no distension.      Palpations: Abdomen is soft. There is no mass.      Tenderness: There is no abdominal tenderness. There is no guarding or rebound.   Musculoskeletal:         General: No tenderness. Normal range of motion.      Cervical back: Normal range of motion and neck supple.   Lymphadenopathy:      Cervical: No cervical adenopathy.   Skin:     General: Skin is warm and dry.      Coloration: Skin is not pale.      Findings: No erythema or rash.   Neurological:      Mental Status: He is alert and oriented to person, place, and time.      Motor: No abnormal muscle tone.      Coordination: Coordination normal.      Deep Tendon Reflexes: Reflexes are normal and symmetric. Reflexes normal.   Psychiatric:         Behavior: Behavior normal.         Thought Content: Thought content normal.         Judgment: Judgment normal.                              Assessment & Plan        1. Dyslipidemia  Under good control continue current regimen.  Please see above comments.  Consider increasing the dose to 80 mg a day in view  of his high coronary calcification score over 400.  But for now we will leave things as they are.  On Lipitor 40 mg daily.  - atorvastatin (LIPITOR) 40 MG Tab; Take 1 Tablet by mouth every evening.  Dispense: 90 Tablet; Refill: 3  - Comp Metabolic Panel; Future  - CBC WITH DIFFERENTIAL; Future  - TSH; Future  - Lipid Profile; Future    2. Coronary artery disease involving native coronary artery of native heart without angina pectoris  As above.  - atorvastatin (LIPITOR) 40 MG Tab; Take 1 Tablet by mouth every evening.  Dispense: 90 Tablet; Refill: 3    3. Abnormal Heart Score CT  As above.  Please see comments in HPI  - atorvastatin (LIPITOR) 40 MG Tab; Take 1 Tablet by mouth every evening.  Dispense: 90 Tablet; Refill: 3    4. Non-seasonal allergic rhinitis due to pollen  Good control continue current regimen  - azelastine (ASTELIN) 137 MCG/SPRAY nasal spray; Administer 1 Spray into affected nostril(S) 2 times a day.  Dispense: 30 mL; Refill: 2  - cetirizine (ZYRTEC) 10 MG Tab; Take 1 Tablet by mouth every day.  Dispense: 30 Tablet; Refill: 3  - fluticasone (FLONASE) 50 MCG/ACT nasal spray; Administer 1 Spray into affected nostril(S) 2 times a day.  Dispense: 16 g; Refill: 3    5. Hypothyroidism due to acquired atrophy of thyroid  Overtreated with TSH suppressed.  Reduce dosage to 212 mcg a day.  - levothyroxine (SYNTHROID) 112 MCG Tab; Take 1 Tablet by mouth every morning on an empty stomach.  Dispense: 90 Tablet; Refill: 3    6. Atherosclerosis of aorta (HCC)      Good control continue current regimen see above comments  - Lipid Profile; Future    7. Risk for falls  Good control.  Continue usage of cane as needed.    8. IFG (impaired fasting glucose)  Borderline unchanged.  Continue Mediterranean diet and exercise.  - HEMOGLOBIN A1C; Future    9. Vitamin D deficiency  Good control continue vitamin D3 2000 units daily.  - VITAMIN D,25 HYDROXY (DEFICIENCY); Future    10. BPH without urinary obstruction     -  PROSTATE SPECIFIC AG DIAGNOSTIC; Future    11. Vitamin B 12 deficiency-overtreated  See above comments.  Reduce dosage to 500 mcg a day.  - Cyanocobalamin (VITAMIN B 12) 500 MCG Tab; Take 500 mcg by mouth 2 times a day.  Dispense: 100 Tablet; Refill: 3    12. Need for vaccination     - Tdap Vaccine =>8YO IM  - Influenza Vaccine, High Dose (65+ Only)            Return in 6 months for annual wellness visit.  Patient advised to get his COVID-vaccine and RSV vaccine at pharmacy.  Otherwise is up-to-date on all vaccinations after flu shot today.

## 2024-02-02 ENCOUNTER — HOSPITAL ENCOUNTER (OUTPATIENT)
Dept: LAB | Facility: MEDICAL CENTER | Age: 79
End: 2024-02-02
Attending: INTERNAL MEDICINE
Payer: MEDICARE

## 2024-02-02 LAB
T4 FREE SERPL-MCNC: 1.43 NG/DL (ref 0.93–1.7)
TSH SERPL DL<=0.005 MIU/L-ACNC: 0.43 UIU/ML (ref 0.38–5.33)

## 2024-02-02 PROCEDURE — 36415 COLL VENOUS BLD VENIPUNCTURE: CPT

## 2024-02-02 PROCEDURE — 84443 ASSAY THYROID STIM HORMONE: CPT

## 2024-02-02 PROCEDURE — 84439 ASSAY OF FREE THYROXINE: CPT

## 2024-03-04 ENCOUNTER — APPOINTMENT (RX ONLY)
Dept: URBAN - METROPOLITAN AREA CLINIC 6 | Facility: CLINIC | Age: 79
Setting detail: DERMATOLOGY
End: 2024-03-04

## 2024-03-04 DIAGNOSIS — D18.0 HEMANGIOMA: ICD-10-CM

## 2024-03-04 DIAGNOSIS — Z71.89 OTHER SPECIFIED COUNSELING: ICD-10-CM

## 2024-03-04 DIAGNOSIS — L82.1 OTHER SEBORRHEIC KERATOSIS: ICD-10-CM

## 2024-03-04 DIAGNOSIS — D22 MELANOCYTIC NEVI: ICD-10-CM

## 2024-03-04 DIAGNOSIS — L81.4 OTHER MELANIN HYPERPIGMENTATION: ICD-10-CM

## 2024-03-04 DIAGNOSIS — L57.0 ACTINIC KERATOSIS: ICD-10-CM

## 2024-03-04 PROBLEM — D22.62 MELANOCYTIC NEVI OF LEFT UPPER LIMB, INCLUDING SHOULDER: Status: ACTIVE | Noted: 2024-03-04

## 2024-03-04 PROBLEM — D22.5 MELANOCYTIC NEVI OF TRUNK: Status: ACTIVE | Noted: 2024-03-04

## 2024-03-04 PROBLEM — D22.39 MELANOCYTIC NEVI OF OTHER PARTS OF FACE: Status: ACTIVE | Noted: 2024-03-04

## 2024-03-04 PROBLEM — D18.01 HEMANGIOMA OF SKIN AND SUBCUTANEOUS TISSUE: Status: ACTIVE | Noted: 2024-03-04

## 2024-03-04 PROBLEM — D22.61 MELANOCYTIC NEVI OF RIGHT UPPER LIMB, INCLUDING SHOULDER: Status: ACTIVE | Noted: 2024-03-04

## 2024-03-04 PROCEDURE — 17004 DESTROY PREMAL LESIONS 15/>: CPT

## 2024-03-04 PROCEDURE — 99213 OFFICE O/P EST LOW 20 MIN: CPT | Mod: 25

## 2024-03-04 PROCEDURE — ? LIQUID NITROGEN

## 2024-03-04 PROCEDURE — ? COUNSELING

## 2024-03-04 PROCEDURE — ? SUNSCREEN RECOMMENDATIONS

## 2024-03-04 ASSESSMENT — LOCATION ZONE DERM
LOCATION ZONE: ARM
LOCATION ZONE: HAND
LOCATION ZONE: EAR
LOCATION ZONE: TRUNK
LOCATION ZONE: SCALP
LOCATION ZONE: FINGER
LOCATION ZONE: FACE

## 2024-03-04 ASSESSMENT — LOCATION SIMPLE DESCRIPTION DERM
LOCATION SIMPLE: RIGHT UPPER BACK
LOCATION SIMPLE: RIGHT THUMB
LOCATION SIMPLE: SCALP
LOCATION SIMPLE: RIGHT FOREARM
LOCATION SIMPLE: RIGHT EAR
LOCATION SIMPLE: LEFT SCALP
LOCATION SIMPLE: RIGHT HAND
LOCATION SIMPLE: LEFT CHEEK
LOCATION SIMPLE: UPPER BACK
LOCATION SIMPLE: ABDOMEN
LOCATION SIMPLE: LEFT FOREARM
LOCATION SIMPLE: RIGHT TEMPLE
LOCATION SIMPLE: LEFT HAND
LOCATION SIMPLE: LEFT OCCIPITAL SCALP
LOCATION SIMPLE: POSTERIOR SCALP
LOCATION SIMPLE: RIGHT UPPER ARM
LOCATION SIMPLE: LEFT UPPER ARM
LOCATION SIMPLE: RIGHT CHEEK
LOCATION SIMPLE: LEFT TEMPLE

## 2024-03-04 ASSESSMENT — LOCATION DETAILED DESCRIPTION DERM
LOCATION DETAILED: RIGHT DORSAL RING METACARPOPHALANGEAL JOINT
LOCATION DETAILED: RIGHT INFERIOR MEDIAL UPPER BACK
LOCATION DETAILED: RIGHT PROXIMAL DORSAL FOREARM
LOCATION DETAILED: LEFT DISTAL POSTERIOR UPPER ARM
LOCATION DETAILED: 1ST WEB SPACE RIGHT HAND
LOCATION DETAILED: RIGHT SUPERIOR LATERAL MALAR CHEEK
LOCATION DETAILED: RIGHT SUPERIOR PREAURICULAR CHEEK
LOCATION DETAILED: MID-OCCIPITAL SCALP
LOCATION DETAILED: LEFT RADIAL DORSAL HAND
LOCATION DETAILED: LEFT CENTRAL MALAR CHEEK
LOCATION DETAILED: LEFT MEDIAL FRONTAL SCALP
LOCATION DETAILED: RIGHT INFERIOR CRUS OF ANTIHELIX
LOCATION DETAILED: LEFT CENTRAL MANDIBULAR CHEEK
LOCATION DETAILED: LEFT INFERIOR CENTRAL MALAR CHEEK
LOCATION DETAILED: RIGHT DISTAL RADIAL DORSAL FOREARM
LOCATION DETAILED: RIGHT DORSAL MIDDLE METACARPOPHALANGEAL JOINT
LOCATION DETAILED: LEFT SUPERIOR OCCIPITAL SCALP
LOCATION DETAILED: LEFT SUPERIOR PARIETAL SCALP
LOCATION DETAILED: RIGHT LATERAL TEMPLE
LOCATION DETAILED: LEFT RIB CAGE
LOCATION DETAILED: RIGHT DISTAL POSTERIOR UPPER ARM
LOCATION DETAILED: LEFT SUPERIOR LATERAL BUCCAL CHEEK
LOCATION DETAILED: SUPERIOR THORACIC SPINE
LOCATION DETAILED: LEFT PROXIMAL DORSAL FOREARM
LOCATION DETAILED: RIGHT VENTRAL DISTAL FOREARM
LOCATION DETAILED: RIGHT ANTERIOR DISTAL UPPER ARM
LOCATION DETAILED: LEFT LATERAL ABDOMEN
LOCATION DETAILED: LEFT ANTERIOR DISTAL UPPER ARM
LOCATION DETAILED: LEFT VENTRAL DISTAL FOREARM
LOCATION DETAILED: LEFT MEDIAL TEMPLE

## 2024-03-04 NOTE — PROCEDURE: LIQUID NITROGEN
Detail Level: Zone
Render Post-Care Instructions In Note?: no
Consent: The patient's consent was obtained including but not limited to risks of crusting, scabbing, blistering, scarring, darker or lighter pigmentary change, recurrence, incomplete removal and infection.
Show Aperture Variable?: Yes
Number Of Freeze-Thaw Cycles: 2 freeze-thaw cycles
Post-Care Instructions: I reviewed with the patient in detail post-care instructions. Patient is to wear sunprotection, and avoid picking at any of the treated lesions. Pt may apply Vaseline to crusted or scabbing areas.
Duration Of Freeze Thaw-Cycle (Seconds): 8

## 2024-06-24 ENCOUNTER — HOSPITAL ENCOUNTER (OUTPATIENT)
Dept: LAB | Facility: MEDICAL CENTER | Age: 79
End: 2024-06-24
Attending: INTERNAL MEDICINE
Payer: MEDICARE

## 2024-06-24 DIAGNOSIS — E78.5 DYSLIPIDEMIA: ICD-10-CM

## 2024-06-24 DIAGNOSIS — E55.9 VITAMIN D DEFICIENCY: ICD-10-CM

## 2024-06-24 DIAGNOSIS — I70.0 ATHEROSCLEROSIS OF AORTA (HCC): ICD-10-CM

## 2024-06-24 DIAGNOSIS — N40.0 BPH WITHOUT URINARY OBSTRUCTION: ICD-10-CM

## 2024-06-24 DIAGNOSIS — R73.01 IFG (IMPAIRED FASTING GLUCOSE): ICD-10-CM

## 2024-06-24 LAB
25(OH)D3 SERPL-MCNC: 28 NG/ML (ref 30–100)
ALBUMIN SERPL BCP-MCNC: 3.9 G/DL (ref 3.2–4.9)
ALBUMIN/GLOB SERPL: 1.3 G/DL
ALP SERPL-CCNC: 67 U/L (ref 30–99)
ALT SERPL-CCNC: 25 U/L (ref 2–50)
ANION GAP SERPL CALC-SCNC: 12 MMOL/L (ref 7–16)
AST SERPL-CCNC: 29 U/L (ref 12–45)
BASOPHILS # BLD AUTO: 1 % (ref 0–1.8)
BASOPHILS # BLD: 0.07 K/UL (ref 0–0.12)
BILIRUB SERPL-MCNC: 1 MG/DL (ref 0.1–1.5)
BUN SERPL-MCNC: 14 MG/DL (ref 8–22)
CALCIUM ALBUM COR SERPL-MCNC: 9.1 MG/DL (ref 8.5–10.5)
CALCIUM SERPL-MCNC: 9 MG/DL (ref 8.5–10.5)
CHLORIDE SERPL-SCNC: 104 MMOL/L (ref 96–112)
CHOLEST SERPL-MCNC: 139 MG/DL (ref 100–199)
CO2 SERPL-SCNC: 23 MMOL/L (ref 20–33)
CREAT SERPL-MCNC: 1.13 MG/DL (ref 0.5–1.4)
EOSINOPHIL # BLD AUTO: 0.39 K/UL (ref 0–0.51)
EOSINOPHIL NFR BLD: 5.8 % (ref 0–6.9)
ERYTHROCYTE [DISTWIDTH] IN BLOOD BY AUTOMATED COUNT: 48.2 FL (ref 35.9–50)
EST. AVERAGE GLUCOSE BLD GHB EST-MCNC: 120 MG/DL
FASTING STATUS PATIENT QL REPORTED: NORMAL
GFR SERPLBLD CREATININE-BSD FMLA CKD-EPI: 66 ML/MIN/1.73 M 2
GLOBULIN SER CALC-MCNC: 3.1 G/DL (ref 1.9–3.5)
GLUCOSE SERPL-MCNC: 103 MG/DL (ref 65–99)
HBA1C MFR BLD: 5.8 % (ref 4–5.6)
HCT VFR BLD AUTO: 45.3 % (ref 42–52)
HDLC SERPL-MCNC: 50 MG/DL
HGB BLD-MCNC: 15.2 G/DL (ref 14–18)
IMM GRANULOCYTES # BLD AUTO: 0.02 K/UL (ref 0–0.11)
IMM GRANULOCYTES NFR BLD AUTO: 0.3 % (ref 0–0.9)
LDLC SERPL CALC-MCNC: 69 MG/DL
LYMPHOCYTES # BLD AUTO: 1.6 K/UL (ref 1–4.8)
LYMPHOCYTES NFR BLD: 23.9 % (ref 22–41)
MCH RBC QN AUTO: 31.9 PG (ref 27–33)
MCHC RBC AUTO-ENTMCNC: 33.6 G/DL (ref 32.3–36.5)
MCV RBC AUTO: 95.2 FL (ref 81.4–97.8)
MONOCYTES # BLD AUTO: 0.65 K/UL (ref 0–0.85)
MONOCYTES NFR BLD AUTO: 9.7 % (ref 0–13.4)
NEUTROPHILS # BLD AUTO: 3.96 K/UL (ref 1.82–7.42)
NEUTROPHILS NFR BLD: 59.3 % (ref 44–72)
NRBC # BLD AUTO: 0 K/UL
NRBC BLD-RTO: 0 /100 WBC (ref 0–0.2)
PLATELET # BLD AUTO: 261 K/UL (ref 164–446)
PMV BLD AUTO: 9.9 FL (ref 9–12.9)
POTASSIUM SERPL-SCNC: 4 MMOL/L (ref 3.6–5.5)
PROT SERPL-MCNC: 7 G/DL (ref 6–8.2)
PSA SERPL-MCNC: 2 NG/ML (ref 0–4)
RBC # BLD AUTO: 4.76 M/UL (ref 4.7–6.1)
SODIUM SERPL-SCNC: 139 MMOL/L (ref 135–145)
TRIGL SERPL-MCNC: 101 MG/DL (ref 0–149)
TSH SERPL DL<=0.005 MIU/L-ACNC: 10.8 UIU/ML (ref 0.38–5.33)
WBC # BLD AUTO: 6.7 K/UL (ref 4.8–10.8)

## 2024-06-24 PROCEDURE — 85025 COMPLETE CBC W/AUTO DIFF WBC: CPT

## 2024-06-24 PROCEDURE — 84153 ASSAY OF PSA TOTAL: CPT

## 2024-06-24 PROCEDURE — 84443 ASSAY THYROID STIM HORMONE: CPT

## 2024-06-24 PROCEDURE — 80061 LIPID PANEL: CPT

## 2024-06-24 PROCEDURE — 83036 HEMOGLOBIN GLYCOSYLATED A1C: CPT | Mod: GA

## 2024-06-24 PROCEDURE — 82306 VITAMIN D 25 HYDROXY: CPT

## 2024-06-24 PROCEDURE — 80053 COMPREHEN METABOLIC PANEL: CPT

## 2024-06-24 PROCEDURE — 36415 COLL VENOUS BLD VENIPUNCTURE: CPT

## 2024-07-09 ENCOUNTER — OFFICE VISIT (OUTPATIENT)
Dept: MEDICAL GROUP | Age: 79
End: 2024-07-09
Payer: MEDICARE

## 2024-07-09 VITALS
RESPIRATION RATE: 16 BRPM | SYSTOLIC BLOOD PRESSURE: 99 MMHG | OXYGEN SATURATION: 98 % | BODY MASS INDEX: 24.1 KG/M2 | HEART RATE: 35 BPM | DIASTOLIC BLOOD PRESSURE: 58 MMHG | TEMPERATURE: 98 F | WEIGHT: 159 LBS | HEIGHT: 68 IN

## 2024-07-09 DIAGNOSIS — I70.0 ATHEROSCLEROSIS OF AORTA (HCC): ICD-10-CM

## 2024-07-09 DIAGNOSIS — R93.1 ABNORMAL HEART SCORE CT: ICD-10-CM

## 2024-07-09 DIAGNOSIS — E78.5 DYSLIPIDEMIA: ICD-10-CM

## 2024-07-09 DIAGNOSIS — R00.1 SINUS BRADYCARDIA: ICD-10-CM

## 2024-07-09 DIAGNOSIS — I25.10 CORONARY ARTERY DISEASE INVOLVING NATIVE CORONARY ARTERY OF NATIVE HEART WITHOUT ANGINA PECTORIS: ICD-10-CM

## 2024-07-09 DIAGNOSIS — E03.4 HYPOTHYROIDISM DUE TO ACQUIRED ATROPHY OF THYROID: ICD-10-CM

## 2024-07-09 DIAGNOSIS — Z00.00 MEDICARE ANNUAL WELLNESS VISIT, SUBSEQUENT: ICD-10-CM

## 2024-07-09 DIAGNOSIS — R73.01 IFG (IMPAIRED FASTING GLUCOSE): ICD-10-CM

## 2024-07-09 PROCEDURE — G0439 PPPS, SUBSEQ VISIT: HCPCS | Performed by: INTERNAL MEDICINE

## 2024-07-09 PROCEDURE — 99214 OFFICE O/P EST MOD 30 MIN: CPT | Mod: 25 | Performed by: INTERNAL MEDICINE

## 2024-07-09 RX ORDER — ATORVASTATIN CALCIUM 40 MG/1
40 TABLET, FILM COATED ORAL NIGHTLY
Qty: 90 TABLET | Refills: 3 | Status: SHIPPED | OUTPATIENT
Start: 2024-07-09

## 2024-07-09 RX ORDER — LEVOTHYROXINE SODIUM 0.12 MG/1
125 TABLET ORAL
Qty: 90 TABLET | Refills: 2 | Status: SHIPPED | OUTPATIENT
Start: 2024-07-09 | End: 2024-07-09 | Stop reason: SDUPTHER

## 2024-07-09 RX ORDER — LEVOTHYROXINE SODIUM 0.12 MG/1
125 TABLET ORAL
Qty: 90 TABLET | Refills: 2 | Status: SHIPPED | OUTPATIENT
Start: 2024-07-09

## 2024-07-09 ASSESSMENT — ACTIVITIES OF DAILY LIVING (ADL): BATHING_REQUIRES_ASSISTANCE: 0

## 2024-07-09 ASSESSMENT — FIBROSIS 4 INDEX: FIB4 SCORE: 1.733333333333333333

## 2024-07-09 ASSESSMENT — ENCOUNTER SYMPTOMS: GENERAL WELL-BEING: GOOD

## 2024-07-09 ASSESSMENT — PATIENT HEALTH QUESTIONNAIRE - PHQ9: CLINICAL INTERPRETATION OF PHQ2 SCORE: 0

## 2024-07-24 ENCOUNTER — TELEPHONE (OUTPATIENT)
Dept: HEALTH INFORMATION MANAGEMENT | Facility: OTHER | Age: 79
End: 2024-07-24
Payer: MEDICARE

## 2024-07-25 ENCOUNTER — TELEPHONE (OUTPATIENT)
Dept: HEALTH INFORMATION MANAGEMENT | Facility: OTHER | Age: 79
End: 2024-07-25
Payer: MEDICARE

## 2024-08-19 ENCOUNTER — HOSPITAL ENCOUNTER (OUTPATIENT)
Dept: LAB | Facility: MEDICAL CENTER | Age: 79
End: 2024-08-19
Attending: INTERNAL MEDICINE
Payer: MEDICARE

## 2024-08-19 DIAGNOSIS — E03.4 HYPOTHYROIDISM DUE TO ACQUIRED ATROPHY OF THYROID: ICD-10-CM

## 2024-08-19 LAB — TSH SERPL-ACNC: 0.88 UIU/ML (ref 0.35–5.5)

## 2024-08-19 PROCEDURE — 36415 COLL VENOUS BLD VENIPUNCTURE: CPT

## 2024-08-19 PROCEDURE — 84443 ASSAY THYROID STIM HORMONE: CPT

## 2024-08-20 ENCOUNTER — OFFICE VISIT (OUTPATIENT)
Dept: MEDICAL GROUP | Age: 79
End: 2024-08-20
Payer: MEDICARE

## 2024-08-20 VITALS
TEMPERATURE: 97.7 F | HEIGHT: 68 IN | BODY MASS INDEX: 24.55 KG/M2 | WEIGHT: 162 LBS | HEART RATE: 52 BPM | OXYGEN SATURATION: 98 % | SYSTOLIC BLOOD PRESSURE: 120 MMHG | DIASTOLIC BLOOD PRESSURE: 62 MMHG

## 2024-08-20 DIAGNOSIS — E78.5 DYSLIPIDEMIA: ICD-10-CM

## 2024-08-20 DIAGNOSIS — I25.10 CORONARY ARTERY DISEASE INVOLVING NATIVE CORONARY ARTERY OF NATIVE HEART WITHOUT ANGINA PECTORIS: ICD-10-CM

## 2024-08-20 DIAGNOSIS — R41.0 CONFUSION: ICD-10-CM

## 2024-08-20 DIAGNOSIS — R73.01 IFG (IMPAIRED FASTING GLUCOSE): ICD-10-CM

## 2024-08-20 DIAGNOSIS — R41.0 CHRONIC CONFUSION: ICD-10-CM

## 2024-08-20 DIAGNOSIS — R41.3 MEMORY LOSS OF UNKNOWN CAUSE: ICD-10-CM

## 2024-08-20 DIAGNOSIS — I70.0 ATHEROSCLEROSIS OF AORTA (HCC): ICD-10-CM

## 2024-08-20 DIAGNOSIS — E03.4 HYPOTHYROIDISM DUE TO ACQUIRED ATROPHY OF THYROID: ICD-10-CM

## 2024-08-20 DIAGNOSIS — E53.8 VITAMIN B 12 DEFICIENCY: ICD-10-CM

## 2024-08-20 DIAGNOSIS — R00.1 BRADYCARDIA: ICD-10-CM

## 2024-08-20 DIAGNOSIS — R93.1 ABNORMAL HEART SCORE CT: ICD-10-CM

## 2024-08-20 DIAGNOSIS — R00.1 SINUS BRADYCARDIA: ICD-10-CM

## 2024-08-20 PROCEDURE — 3074F SYST BP LT 130 MM HG: CPT | Performed by: STUDENT IN AN ORGANIZED HEALTH CARE EDUCATION/TRAINING PROGRAM

## 2024-08-20 PROCEDURE — G2211 COMPLEX E/M VISIT ADD ON: HCPCS | Performed by: STUDENT IN AN ORGANIZED HEALTH CARE EDUCATION/TRAINING PROGRAM

## 2024-08-20 PROCEDURE — 99214 OFFICE O/P EST MOD 30 MIN: CPT | Performed by: STUDENT IN AN ORGANIZED HEALTH CARE EDUCATION/TRAINING PROGRAM

## 2024-08-20 PROCEDURE — 3078F DIAST BP <80 MM HG: CPT | Performed by: STUDENT IN AN ORGANIZED HEALTH CARE EDUCATION/TRAINING PROGRAM

## 2024-08-20 ASSESSMENT — ENCOUNTER SYMPTOMS
SHORTNESS OF BREATH: 0
PALPITATIONS: 0
HEARTBURN: 0
COUGH: 0
WEAKNESS: 0
WHEEZING: 0
BRUISES/BLEEDS EASILY: 0
ABDOMINAL PAIN: 0
BLOOD IN STOOL: 0
SENSORY CHANGE: 0
BACK PAIN: 0
BLURRED VISION: 0
FEVER: 0
DEPRESSION: 0
ORTHOPNEA: 0
DIZZINESS: 0
CHILLS: 0
HEADACHES: 0
DOUBLE VISION: 0

## 2024-08-20 ASSESSMENT — FIBROSIS 4 INDEX: FIB4 SCORE: 1.733333333333333333

## 2024-08-20 NOTE — PROGRESS NOTES
Subjective:     CC: Lab review, blood pressure follow-up.    HPI:   History of Present Illness  The patient is a 78-year-old male presenting for a blood pressure follow-up.    He has been monitoring his blood pressure at home, which was recorded as 99/59 during an EKG. He mentions that his pulse rate was low on the day of the EKG, but it has since stabilized. He experiences lightheadedness when climbing stairs or standing up, and his pulse rate tends to be low during these episodes.    A few years ago, he underwent a 30-day heart monitor [facility]. He experienced dizziness following his COVID-19 vaccination.    He is currently on thyroid medication, levothyroxine, with a dosage of 125 mcg. His dosage was temporarily reduced to 112.5 mcg in 02/2024, but this led to lightheadedness and dizziness, and his TSH increased greater than 10. Consequently, his dosage was increased back to 125 mcg 30 days ago. He had a blood test yesterday and reports feeling better with less dizziness.  TSH improved from 10.8 to 0.875.    Patient brought with him his blood pressure log for the past 6 weeks showing most of his blood pressure readings at target, however he has been experiencing bradycardia.  He has had a few heart rates in the low 40s, but also a few days his heart rate was noted to be in the mid to high 30s.       ROS:  Review of Systems   Constitutional:  Negative for chills, fever and malaise/fatigue.   HENT:  Negative for nosebleeds and tinnitus.    Eyes:  Negative for blurred vision and double vision.   Respiratory:  Negative for cough, shortness of breath and wheezing.    Cardiovascular:  Negative for chest pain, palpitations, orthopnea and leg swelling.   Gastrointestinal:  Negative for abdominal pain, blood in stool, heartburn and melena.   Genitourinary:  Negative for dysuria and urgency.   Musculoskeletal:  Negative for back pain and joint pain.   Skin:  Negative for rash.  "  Neurological:  Negative for dizziness, sensory change, weakness and headaches.        He has experienced lightheadedness sporadically and mild mental fogginess.   Endo/Heme/Allergies:  Does not bruise/bleed easily.   Psychiatric/Behavioral:  Negative for depression and suicidal ideas.        Objective:     Exam:  /62 (BP Location: Right arm, Patient Position: Sitting, BP Cuff Size: Adult)   Pulse (!) 52   Temp 36.5 °C (97.7 °F) (Temporal)   Ht 1.727 m (5' 8\")   Wt 73.5 kg (162 lb)   SpO2 98%   BMI 24.63 kg/m²  Body mass index is 24.63 kg/m².    Physical Exam  Vitals reviewed.   Constitutional:       General: He is not in acute distress.  HENT:      Head: Normocephalic and atraumatic.      Nose: No congestion.      Mouth/Throat:      Mouth: Mucous membranes are moist.      Pharynx: No oropharyngeal exudate or posterior oropharyngeal erythema.   Eyes:      General: No scleral icterus.     Extraocular Movements: Extraocular movements intact.      Pupils: Pupils are equal, round, and reactive to light.   Cardiovascular:      Rate and Rhythm: Normal rate and regular rhythm.      Pulses: Normal pulses.      Heart sounds: Normal heart sounds. No murmur heard.  Pulmonary:      Effort: Pulmonary effort is normal. No respiratory distress.      Breath sounds: Normal breath sounds. No wheezing.   Abdominal:      General: Bowel sounds are normal. There is no distension.      Palpations: Abdomen is soft.      Tenderness: There is no abdominal tenderness. There is no guarding or rebound.   Musculoskeletal:      Cervical back: Normal range of motion and neck supple.      Right lower leg: No edema.      Left lower leg: No edema.   Skin:     General: Skin is warm.      Capillary Refill: Capillary refill takes less than 2 seconds.      Coloration: Skin is not jaundiced.      Findings: No bruising or erythema.   Neurological:      General: No focal deficit present.      Mental Status: He is alert.      Cranial Nerves: No " cranial nerve deficit.      Sensory: No sensory deficit.   Psychiatric:         Mood and Affect: Mood normal.         Behavior: Behavior normal.       Labs: Reviewed    Assessment & Plan:     78 y.o. male with the following -     1. Sinus bradycardia  2. Bradycardia  -Patient presented today for a follow-up visit for blood pressure monitoring.  Patient's blood pressure log showed normotension, however his heart rate has been has been in the low 40s and high 30s multiple times.  Patient also has been experiencing lightheadedness and mental fogginess.  -In previous visit with his PCP he was referred him for Zio patch monitoring, however the patient has not schedule the visit yet.  He wanted to see his primary care physician today to discuss he needs to pursue Zio patch monitoring.  -Apparently patient was under the impression that he was going to see his primary care physician at today's appointment.  -I told Mr. Eid and his son that he needs to schedule the Holter monitoring given symptomatic bradycardia.  -I also told him that I will discuss his case with his primary care physician Dr. Mcfarlane  -I also placed a referral to see cardiologist    - REFERRAL TO CARDIOLOGY    3. Hypothyroidism due to acquired atrophy of thyroid  -Chronic, stable  -Few months ago his medication was reduced from 125 to 12.5 mcg of levothyroxine and unfortunately his TSH went up to 10.8 and apparently he was having cardiac symptoms.  -Most recent visit with his PCP his medication was adjusted again to 125 mcg daily, after this patient started feeling much better.  -TSH done yesterday came back to normal 0.875  -I recommended to Mr. Eid to continue taking levothyroxine 125 mcg daily  -Follow-up with his PCP as scheduled  -Repeat labs at least every 6 to 12 months    4. Coronary artery disease involving native coronary artery of native heart without angina pectoris  5. Atherosclerosis of aorta (HCC)  6. Abnormal Heart Score CT  7.  Dyslipidemia  -Chronic, stable  -Currently on Lipitor 40 mg daily  -Most recent lipid profile in June of this year showed LDL at goal less than 70  -Patient is not having any side effects  -Continue same therapy      8. IFG (impaired fasting glucose)  -History prediabetes  -An A1c in June of this year was 5.8%  -Encouraged to exercise regularly, DASH diet or plant-based diet  -Repeat labs at least yearly    Return in about 2 months (around 10/20/2024).    Please note that this dictation was created using voice recognition software. I have made every reasonable attempt to correct obvious errors, but I expect that there are errors of grammar and possibly content that I did not discover before finalizing the note.

## 2024-08-20 NOTE — PATIENT INSTRUCTIONS
- Continue home medications  - Schedule ZiPatch monitoring  - Follow up on referral to Cardiologist   - Schedule appt with PCP in 1-2 months

## 2024-09-03 ENCOUNTER — HOSPITAL ENCOUNTER (OUTPATIENT)
Dept: RADIOLOGY | Facility: MEDICAL CENTER | Age: 79
End: 2024-09-03
Attending: INTERNAL MEDICINE
Payer: MEDICARE

## 2024-09-03 DIAGNOSIS — R41.0 CHRONIC CONFUSION: ICD-10-CM

## 2024-09-03 DIAGNOSIS — R41.3 MEMORY LOSS OF UNKNOWN CAUSE: ICD-10-CM

## 2024-09-03 PROCEDURE — 70551 MRI BRAIN STEM W/O DYE: CPT

## 2024-09-04 ENCOUNTER — NON-PROVIDER VISIT (OUTPATIENT)
Dept: CARDIOLOGY | Facility: MEDICAL CENTER | Age: 79
End: 2024-09-04
Attending: INTERNAL MEDICINE
Payer: MEDICARE

## 2024-09-04 DIAGNOSIS — R00.1 SINUS BRADYCARDIA: ICD-10-CM

## 2024-09-04 NOTE — PROGRESS NOTES
Awaiting call from pt for physical address LVM 09/04/24 Home enrollment completed in the 14 day Zio XT Holter monitor per Marcial Mcfarlane MD. Monitor will be shipped to patient via [employer] to [id number] [address] [place] [id number]. Pending EOS.

## 2024-09-11 ENCOUNTER — APPOINTMENT (RX ONLY)
Dept: URBAN - METROPOLITAN AREA CLINIC 6 | Facility: CLINIC | Age: 79
Setting detail: DERMATOLOGY
End: 2024-09-11

## 2024-09-11 DIAGNOSIS — L57.0 ACTINIC KERATOSIS: ICD-10-CM

## 2024-09-11 DIAGNOSIS — L82.1 OTHER SEBORRHEIC KERATOSIS: ICD-10-CM

## 2024-09-11 PROCEDURE — ? COUNSELING

## 2024-09-11 PROCEDURE — 17003 DESTRUCT PREMALG LES 2-14: CPT

## 2024-09-11 PROCEDURE — 99212 OFFICE O/P EST SF 10 MIN: CPT | Mod: 25

## 2024-09-11 PROCEDURE — 17000 DESTRUCT PREMALG LESION: CPT

## 2024-09-11 PROCEDURE — ? LIQUID NITROGEN

## 2024-09-11 PROCEDURE — ? ADDITIONAL NOTES

## 2024-09-11 ASSESSMENT — LOCATION DETAILED DESCRIPTION DERM
LOCATION DETAILED: LEFT MEDIAL FRONTAL SCALP
LOCATION DETAILED: RIGHT TRAGUS
LOCATION DETAILED: LEFT INFERIOR MEDIAL FOREHEAD
LOCATION DETAILED: RIGHT SUPERIOR PREAURICULAR CHEEK
LOCATION DETAILED: LEFT CENTRAL ZYGOMA
LOCATION DETAILED: RIGHT INFERIOR FOREHEAD
LOCATION DETAILED: LEFT MEDIAL ZYGOMA
LOCATION DETAILED: LEFT FOREHEAD
LOCATION DETAILED: LEFT MEDIAL FOREHEAD
LOCATION DETAILED: RIGHT MEDIAL FOREHEAD

## 2024-09-11 ASSESSMENT — LOCATION ZONE DERM
LOCATION ZONE: SCALP
LOCATION ZONE: FACE
LOCATION ZONE: EAR

## 2024-09-11 ASSESSMENT — LOCATION SIMPLE DESCRIPTION DERM
LOCATION SIMPLE: RIGHT FOREHEAD
LOCATION SIMPLE: LEFT ZYGOMA
LOCATION SIMPLE: RIGHT CHEEK
LOCATION SIMPLE: LEFT SCALP
LOCATION SIMPLE: RIGHT EAR
LOCATION SIMPLE: LEFT FOREHEAD

## 2024-09-11 NOTE — PROCEDURE: ADDITIONAL NOTES
Additional Notes: A few lesions treated with cryotherapy today as a courtesy.
Detail Level: Detailed
Render Risk Assessment In Note?: no

## 2024-09-11 NOTE — PROCEDURE: LIQUID NITROGEN
Number Of Freeze-Thaw Cycles: 2 freeze-thaw cycles
Detail Level: Zone
Render Post-Care Instructions In Note?: no
Consent: The patient's verbal consent was obtained including but not limited to risks of crusting, scabbing, blistering, scarring, darker or lighter pigmentary change, recurrence, incomplete removal and infection.
Show Aperture Variable?: Yes
Post-Care Instructions: I reviewed with the patient in detail post-care instructions. Patient is to wear sunprotection, and avoid picking at any of the treated lesions. Pt may apply Vaseline to crusted or scabbing areas.
Duration Of Freeze Thaw-Cycle (Seconds): 8

## 2024-10-07 ENCOUNTER — TELEPHONE (OUTPATIENT)
Dept: CARDIOLOGY | Facility: MEDICAL CENTER | Age: 79
End: 2024-10-07
Payer: MEDICARE

## 2024-10-15 ENCOUNTER — OFFICE VISIT (OUTPATIENT)
Dept: CARDIOLOGY | Facility: MEDICAL CENTER | Age: 79
End: 2024-10-15
Attending: STUDENT IN AN ORGANIZED HEALTH CARE EDUCATION/TRAINING PROGRAM
Payer: MEDICARE

## 2024-10-15 VITALS
DIASTOLIC BLOOD PRESSURE: 68 MMHG | OXYGEN SATURATION: 96 % | BODY MASS INDEX: 23.49 KG/M2 | HEART RATE: 78 BPM | HEIGHT: 68 IN | SYSTOLIC BLOOD PRESSURE: 122 MMHG | WEIGHT: 155 LBS | RESPIRATION RATE: 16 BRPM

## 2024-10-15 DIAGNOSIS — R93.1 ABNORMAL HEART SCORE CT: ICD-10-CM

## 2024-10-15 DIAGNOSIS — I70.0 ATHEROSCLEROSIS OF AORTA (HCC): ICD-10-CM

## 2024-10-15 DIAGNOSIS — R00.1 SINUS BRADYCARDIA: ICD-10-CM

## 2024-10-15 DIAGNOSIS — E78.5 DYSLIPIDEMIA: ICD-10-CM

## 2024-10-15 PROCEDURE — 3078F DIAST BP <80 MM HG: CPT

## 2024-10-15 PROCEDURE — 3074F SYST BP LT 130 MM HG: CPT

## 2024-10-15 PROCEDURE — 99214 OFFICE O/P EST MOD 30 MIN: CPT

## 2024-10-15 PROCEDURE — 99213 OFFICE O/P EST LOW 20 MIN: CPT

## 2024-10-15 RX ORDER — ASPIRIN 81 MG/1
81 TABLET, CHEWABLE ORAL DAILY
Qty: 100 TABLET | Refills: 3 | Status: SHIPPED | OUTPATIENT
Start: 2024-10-15

## 2024-10-15 ASSESSMENT — ENCOUNTER SYMPTOMS
MUSCULOSKELETAL NEGATIVE: 1
SHORTNESS OF BREATH: 0
NERVOUS/ANXIOUS: 0
NEUROLOGICAL NEGATIVE: 1
PALPITATIONS: 0
ORTHOPNEA: 0
GASTROINTESTINAL NEGATIVE: 1
EYES NEGATIVE: 1
CONSTITUTIONAL NEGATIVE: 1
PND: 0
DEPRESSION: 0

## 2024-10-15 ASSESSMENT — FIBROSIS 4 INDEX: FIB4 SCORE: 1.733333333333333333

## 2024-12-03 DIAGNOSIS — N52.9 VASCULOGENIC ERECTILE DYSFUNCTION, UNSPECIFIED VASCULOGENIC ERECTILE DYSFUNCTION TYPE: ICD-10-CM

## 2024-12-03 RX ORDER — SILDENAFIL 100 MG/1
TABLET, FILM COATED ORAL
Qty: 180 TABLET | Refills: 1 | Status: SHIPPED | OUTPATIENT
Start: 2024-12-03

## 2025-01-13 ENCOUNTER — HOSPITAL ENCOUNTER (OUTPATIENT)
Dept: LAB | Facility: MEDICAL CENTER | Age: 80
End: 2025-01-13
Attending: INTERNAL MEDICINE
Payer: MEDICARE

## 2025-01-13 ENCOUNTER — APPOINTMENT (OUTPATIENT)
Dept: MEDICAL GROUP | Age: 80
End: 2025-01-13
Payer: MEDICARE

## 2025-01-13 DIAGNOSIS — E03.4 HYPOTHYROIDISM DUE TO ACQUIRED ATROPHY OF THYROID: ICD-10-CM

## 2025-01-13 DIAGNOSIS — E78.5 DYSLIPIDEMIA: ICD-10-CM

## 2025-01-13 DIAGNOSIS — R41.0 CONFUSION: ICD-10-CM

## 2025-01-13 DIAGNOSIS — E53.8 VITAMIN B 12 DEFICIENCY: ICD-10-CM

## 2025-01-13 PROCEDURE — 80061 LIPID PANEL: CPT

## 2025-01-13 PROCEDURE — 82607 VITAMIN B-12: CPT

## 2025-01-13 PROCEDURE — 84443 ASSAY THYROID STIM HORMONE: CPT

## 2025-01-13 PROCEDURE — 82746 ASSAY OF FOLIC ACID SERUM: CPT

## 2025-01-13 PROCEDURE — 36415 COLL VENOUS BLD VENIPUNCTURE: CPT

## 2025-01-13 PROCEDURE — 80053 COMPREHEN METABOLIC PANEL: CPT

## 2025-01-14 LAB
ALBUMIN SERPL BCP-MCNC: 4.1 G/DL (ref 3.2–4.9)
ALBUMIN/GLOB SERPL: 1.5 G/DL
ALP SERPL-CCNC: 63 U/L (ref 30–99)
ALT SERPL-CCNC: 21 U/L (ref 2–50)
ANION GAP SERPL CALC-SCNC: 11 MMOL/L (ref 7–16)
AST SERPL-CCNC: 28 U/L (ref 12–45)
BILIRUB SERPL-MCNC: 0.6 MG/DL (ref 0.1–1.5)
BUN SERPL-MCNC: 15 MG/DL (ref 8–22)
CALCIUM ALBUM COR SERPL-MCNC: 9 MG/DL (ref 8.5–10.5)
CALCIUM SERPL-MCNC: 9.1 MG/DL (ref 8.5–10.5)
CHLORIDE SERPL-SCNC: 106 MMOL/L (ref 96–112)
CHOLEST SERPL-MCNC: 145 MG/DL (ref 100–199)
CO2 SERPL-SCNC: 23 MMOL/L (ref 20–33)
CREAT SERPL-MCNC: 1 MG/DL (ref 0.5–1.4)
FOLATE SERPL-MCNC: 14.9 NG/ML
GFR SERPLBLD CREATININE-BSD FMLA CKD-EPI: 76 ML/MIN/1.73 M 2
GLOBULIN SER CALC-MCNC: 2.7 G/DL (ref 1.9–3.5)
GLUCOSE SERPL-MCNC: 89 MG/DL (ref 65–99)
HDLC SERPL-MCNC: 46 MG/DL
LDLC SERPL CALC-MCNC: 81 MG/DL
POTASSIUM SERPL-SCNC: 4 MMOL/L (ref 3.6–5.5)
PROT SERPL-MCNC: 6.8 G/DL (ref 6–8.2)
SODIUM SERPL-SCNC: 140 MMOL/L (ref 135–145)
TRIGL SERPL-MCNC: 92 MG/DL (ref 0–149)
TSH SERPL-ACNC: 9.74 UIU/ML (ref 0.35–5.5)
VIT B12 SERPL-MCNC: 1719 PG/ML (ref 211–911)

## 2025-01-15 ENCOUNTER — HOSPITAL ENCOUNTER (OUTPATIENT)
Dept: LAB | Facility: MEDICAL CENTER | Age: 80
End: 2025-01-15
Attending: INTERNAL MEDICINE
Payer: MEDICARE

## 2025-01-15 ENCOUNTER — OFFICE VISIT (OUTPATIENT)
Dept: MEDICAL GROUP | Age: 80
End: 2025-01-15
Payer: MEDICARE

## 2025-01-15 VITALS
HEIGHT: 68 IN | SYSTOLIC BLOOD PRESSURE: 108 MMHG | TEMPERATURE: 98.3 F | HEART RATE: 53 BPM | OXYGEN SATURATION: 96 % | WEIGHT: 160 LBS | BODY MASS INDEX: 24.25 KG/M2 | DIASTOLIC BLOOD PRESSURE: 60 MMHG

## 2025-01-15 DIAGNOSIS — I25.10 CORONARY ARTERY DISEASE INVOLVING NATIVE CORONARY ARTERY OF NATIVE HEART WITHOUT ANGINA PECTORIS: ICD-10-CM

## 2025-01-15 DIAGNOSIS — I70.0 ATHEROSCLEROSIS OF AORTA (HCC): ICD-10-CM

## 2025-01-15 DIAGNOSIS — R93.1 ABNORMAL HEART SCORE CT: ICD-10-CM

## 2025-01-15 DIAGNOSIS — K52.9 CHRONIC DIARRHEA: ICD-10-CM

## 2025-01-15 DIAGNOSIS — K21.9 GASTROESOPHAGEAL REFLUX DISEASE WITHOUT ESOPHAGITIS: ICD-10-CM

## 2025-01-15 DIAGNOSIS — E78.5 DYSLIPIDEMIA: ICD-10-CM

## 2025-01-15 DIAGNOSIS — E03.4 HYPOTHYROIDISM DUE TO ACQUIRED ATROPHY OF THYROID: ICD-10-CM

## 2025-01-15 DIAGNOSIS — R73.01 IFG (IMPAIRED FASTING GLUCOSE): ICD-10-CM

## 2025-01-15 LAB
BASOPHILS # BLD AUTO: 0.5 % (ref 0–1.8)
BASOPHILS # BLD: 0.04 K/UL (ref 0–0.12)
EOSINOPHIL # BLD AUTO: 0.21 K/UL (ref 0–0.51)
EOSINOPHIL NFR BLD: 2.7 % (ref 0–6.9)
ERYTHROCYTE [DISTWIDTH] IN BLOOD BY AUTOMATED COUNT: 47.4 FL (ref 35.9–50)
ERYTHROCYTE [SEDIMENTATION RATE] IN BLOOD BY WESTERGREN METHOD: 14 MM/HOUR (ref 0–20)
HCT VFR BLD AUTO: 39.4 % (ref 42–52)
HGB BLD-MCNC: 13.5 G/DL (ref 14–18)
IMM GRANULOCYTES # BLD AUTO: 0.03 K/UL (ref 0–0.11)
IMM GRANULOCYTES NFR BLD AUTO: 0.4 % (ref 0–0.9)
LYMPHOCYTES # BLD AUTO: 1.98 K/UL (ref 1–4.8)
LYMPHOCYTES NFR BLD: 25.7 % (ref 22–41)
MCH RBC QN AUTO: 32.7 PG (ref 27–33)
MCHC RBC AUTO-ENTMCNC: 34.3 G/DL (ref 32.3–36.5)
MCV RBC AUTO: 95.4 FL (ref 81.4–97.8)
MONOCYTES # BLD AUTO: 0.77 K/UL (ref 0–0.85)
MONOCYTES NFR BLD AUTO: 10 % (ref 0–13.4)
NEUTROPHILS # BLD AUTO: 4.66 K/UL (ref 1.82–7.42)
NEUTROPHILS NFR BLD: 60.7 % (ref 44–72)
NRBC # BLD AUTO: 0 K/UL
NRBC BLD-RTO: 0 /100 WBC (ref 0–0.2)
PLATELET # BLD AUTO: 283 K/UL (ref 164–446)
PMV BLD AUTO: 9.8 FL (ref 9–12.9)
RBC # BLD AUTO: 4.13 M/UL (ref 4.7–6.1)
WBC # BLD AUTO: 7.7 K/UL (ref 4.8–10.8)

## 2025-01-15 PROCEDURE — 85025 COMPLETE CBC W/AUTO DIFF WBC: CPT

## 2025-01-15 PROCEDURE — 99214 OFFICE O/P EST MOD 30 MIN: CPT | Performed by: INTERNAL MEDICINE

## 2025-01-15 PROCEDURE — 86364 TISS TRNSGLTMNASE EA IG CLAS: CPT | Mod: 91

## 2025-01-15 PROCEDURE — 85652 RBC SED RATE AUTOMATED: CPT

## 2025-01-15 PROCEDURE — 3078F DIAST BP <80 MM HG: CPT | Performed by: INTERNAL MEDICINE

## 2025-01-15 PROCEDURE — 36415 COLL VENOUS BLD VENIPUNCTURE: CPT

## 2025-01-15 PROCEDURE — 3074F SYST BP LT 130 MM HG: CPT | Performed by: INTERNAL MEDICINE

## 2025-01-15 PROCEDURE — 86258 DGP ANTIBODY EACH IG CLASS: CPT | Mod: 91

## 2025-01-15 RX ORDER — ASPIRIN 81 MG/1
81 TABLET, CHEWABLE ORAL DAILY
Qty: 100 TABLET | Refills: 3 | Status: SHIPPED | OUTPATIENT
Start: 2025-01-15

## 2025-01-15 RX ORDER — LEVOTHYROXINE SODIUM 125 UG/1
125 TABLET ORAL
Qty: 90 TABLET | Refills: 2 | Status: SHIPPED
Start: 2025-01-15 | End: 2025-01-28

## 2025-01-15 RX ORDER — ATORVASTATIN CALCIUM 40 MG/1
40 TABLET, FILM COATED ORAL NIGHTLY
Qty: 90 TABLET | Refills: 3 | Status: SHIPPED | OUTPATIENT
Start: 2025-01-15

## 2025-01-15 ASSESSMENT — FIBROSIS 4 INDEX: FIB4 SCORE: 1.85

## 2025-01-16 NOTE — ASSESSMENT & PLAN NOTE
Good control continue current regimen  Orders:    atorvastatin (LIPITOR) 40 MG Tab; Take 1 Tablet by mouth every evening.

## 2025-01-16 NOTE — ASSESSMENT & PLAN NOTE
Patient will use age to blocker like famotidine OTC as needed.  Avoid PPIs for now.  Follow-up with GI.        Esmer Russell RN [employer]

## 2025-01-16 NOTE — ASSESSMENT & PLAN NOTE
High risk for coronary disease.  Continue on statin and aspirin.  Orders:    atorvastatin (LIPITOR) 40 MG Tab; Take 1 Tablet by mouth every evening.    aspirin (ASA) 81 MG Chew Tab chewable tablet; Chew 1 Tablet every day.

## 2025-01-16 NOTE — ASSESSMENT & PLAN NOTE
Not at goal.  But may have missed a few doses that would explain his elevated TSH of 9.  Will recheck at confirm prior to next visit.  If still elevated will increase his L-thyroxine from 125 up to 137 mcg a day.  Orders:    levothyroxine (SYNTHROID) 125 MCG Tab; Take 1 Tablet by mouth every morning on an empty stomach.

## 2025-01-16 NOTE — PROGRESS NOTES
Subjective     Dusty Eid is a 79 y.o. male who presents with Follow-Up (Lab review )    History of Present Illness  The patient presents for evaluation of diarrhea, tingling sensation on his face, and thyroid management.    He has been experiencing diarrhea for the past month, with 3 to 4 watery bowel movements daily. He reports no presence of blood in his stool. He has discontinued all supplements and has not consumed milk or soda. He has not experienced any weight loss or abdominal pain. He maintains a high water intake, consuming 4 to 6 bottles daily. He has a history of diarrhea, particularly when consuming spicy foods, but has managed to control this through dietary modifications. He has not traveled recently.    He also reports a tingling sensation on one side of his face, which started around the same time as his diarrhea. He has not taken any medication for this symptom.    He is currently on thyroid medication, with a dosage adjustment made approximately a year ago. He has not run out of his thyroid medication and takes it regularly.    Supplemental Information  He has a history of hernia repair surgery in , during which he experienced significant weight loss and vomiting. He has a history of heartburn and indigestion, which he manages with over-the-counter medications as needed. He also takes iron supplements intermittently, typically once a week.    FAMILY HISTORY  His grandmother  of colon cancer.    MEDICATIONS  Current: levothyroxine, iron supplement, cholesterol medication    Outpatient Medications Prior to Visit   Medication Sig Dispense Refill    sildenafil citrate (VIAGRA) 100 MG tablet Take one tablet by mouth as needed for erectile dysfunction 180 Tablet 1    azelastine (ASTELIN) 137 MCG/SPRAY nasal spray Administer 1 Spray into affected nostril(S) 2 times a day. 30 mL 2    cetirizine (ZYRTEC) 10 MG Tab Take 1 Tablet by mouth every day. 30 Tablet 3    fluticasone (FLONASE) 50  MCG/ACT nasal spray Administer 1 Spray into affected nostril(S) 2 times a day. 16 g 3    Cyanocobalamin (VITAMIN B 12) 500 MCG Tab Take 500 mcg by mouth 2 times a day. 100 Tablet 3    Plant Sterols and Stanols (CHOLESTOFF PLUS PO) Take  by mouth 2 times a day.      Flaxseed, Linseed, (FLAX SEED OIL PO) Take 1,400 mg by mouth every day.      Omega-3 Fatty Acids (FISH OIL) 1200 MG Cap Take  by mouth 2 times a day.      Ascorbic Acid (SUPER C COMPLEX PO) Take  by mouth every day.      Zinc 50 MG Tab Take 50 mg by mouth every day.      Omeprazole Magnesium (PRILOSEC PO) Take  by mouth every day.      Turmeric (QC TUMERIC COMPLEX PO) Take  by mouth 2 times a day.      aspirin (ASA) 81 MG Chew Tab chewable tablet Chew 1 Tablet every day. 100 Tablet 3    levothyroxine (SYNTHROID) 125 MCG Tab Take 1 Tablet by mouth every morning on an empty stomach. 90 Tablet 2    atorvastatin (LIPITOR) 40 MG Tab Take 1 Tablet by mouth every evening. 90 Tablet 3     No facility-administered medications prior to visit.     Hospital Outpatient Visit on 01/13/2025   Component Date Value    Folate -Folic Acid 01/13/2025 14.9     Vitamin B12 -True Cobala* 01/13/2025 1719 (H)     Sodium 01/13/2025 140     Potassium 01/13/2025 4.0     Chloride 01/13/2025 106     Co2 01/13/2025 23     Anion Gap 01/13/2025 11.0     Glucose 01/13/2025 89     Bun 01/13/2025 15     Creatinine 01/13/2025 1.00     Calcium 01/13/2025 9.1     Correct Calcium 01/13/2025 9.0     AST(SGOT) 01/13/2025 28     ALT(SGPT) 01/13/2025 21     Alkaline Phosphatase 01/13/2025 63     Total Bilirubin 01/13/2025 0.6     Albumin 01/13/2025 4.1     Total Protein 01/13/2025 6.8     Globulin 01/13/2025 2.7     A-G Ratio 01/13/2025 1.5     Cholesterol,Tot 01/13/2025 145     Triglycerides 01/13/2025 92     HDL 01/13/2025 46     LDL 01/13/2025 81     TSH 01/13/2025 9.740 (H)     GFR (CKD-EPI) 01/13/2025 76       Lab Results   Component Value Date/Time    HBA1C 5.8 (H) 06/24/2024 07:43 AM     "HBA1C 5.9 (H) 12/27/2023 10:53 AM     Lab Results   Component Value Date/Time    SODIUM 140 01/13/2025 07:14 AM    POTASSIUM 4.0 01/13/2025 07:14 AM    CHLORIDE 106 01/13/2025 07:14 AM    CO2 23 01/13/2025 07:14 AM    GLUCOSE 89 01/13/2025 07:14 AM    BUN 15 01/13/2025 07:14 AM    CREATININE 1.00 01/13/2025 07:14 AM    CREATININE 1.15 12/09/2010 07:25 AM    BUNCREATRAT 16 12/09/2010 07:25 AM    GLOMRATE >59 12/09/2010 07:25 AM    ALKPHOSPHAT 63 01/13/2025 07:14 AM    ASTSGOT 28 01/13/2025 07:14 AM    ALTSGPT 21 01/13/2025 07:14 AM    TBILIRUBIN 0.6 01/13/2025 07:14 AM     No results found for: \"INR\"  Lab Results   Component Value Date/Time    CHOLSTRLTOT 145 01/13/2025 07:14 AM    LDL 81 01/13/2025 07:14 AM    HDL 46 01/13/2025 07:14 AM    TRIGLYCERIDE 92 01/13/2025 07:14 AM       Lab Results   Component Value Date/Time    TESTOSTERONE 695 06/30/2014 10:29 AM     Lab Results   Component Value Date/Time    TSH 20.800 (H) 03/29/2011 11:33 AM    TSH 20.440 (H) 12/09/2010 07:25 AM     Lab Results   Component Value Date/Time    FREET4 1.43 02/02/2024 11:17 AM    FREET4 1.59 08/09/2022 06:45 AM     No results found for: \"URICACID\"  No components found for: \"VITB12\"  Lab Results   Component Value Date/Time    25HYDROXY 28 (L) 06/24/2024 07:43 AM    25HYDROXY 32 12/27/2023 10:53 AM               HPI    ROS           Objective     /60 (BP Location: Left arm, Patient Position: Sitting, BP Cuff Size: Adult)   Pulse (!) 53   Temp 36.8 °C (98.3 °F) (Temporal)   Ht 1.727 m (5' 8\")   Wt 72.6 kg (160 lb)   SpO2 96%   BMI 24.33 kg/m²      Physical Exam  HEENT neck thyroid heart lungs abdomen extremities unremarkable.                      Assessment & Plan        Assessment & Plan  Chronic diarrhea  Unclear etiology.  Rule out infectious etiology or celiac disease or other pathology.  Refer to GI for possible colonoscopy and obtain stool specimens and celiac panel.  Recheck in a few weeks to review results.    Patient " will stop his PPI for now as this may be contributing to his diarrhea.  TSH slightly elevated at 9 so this would not be the cause of his diarrhea.  Orders:    CBC WITH DIFFERENTIAL; Future    Cdiff By PCR Rflx Toxin; Future    H.PYLORI STOOL ANTIGEN; Future    CULTURE STOOL; Future    OCCULT BLOOD STOOL; Future    Complete O&P; Future    CELIAC DISEASE AB PANEL; Future    Sed Rate; Future    Dyslipidemia  Good control continue current regimen  Orders:    atorvastatin (LIPITOR) 40 MG Tab; Take 1 Tablet by mouth every evening.    IFG (impaired fasting glucose)  Good control continue mentoring diet and exercise.       Hypothyroidism due to acquired atrophy of thyroid  Not at goal.  But may have missed a few doses that would explain his elevated TSH of 9.  Will recheck at confirm prior to next visit.  If still elevated will increase his L-thyroxine from 125 up to 137 mcg a day.  Orders:    levothyroxine (SYNTHROID) 125 MCG Tab; Take 1 Tablet by mouth every morning on an empty stomach.    Gastroesophageal reflux disease without esophagitis  Patient will use age to blocker like famotidine OTC as needed.  Avoid PPIs for now.  Follow-up with GI.       Abnormal Heart Score CT  High risk for coronary disease.  Continue on statin and aspirin.  Orders:    atorvastatin (LIPITOR) 40 MG Tab; Take 1 Tablet by mouth every evening.    aspirin (ASA) 81 MG Chew Tab chewable tablet; Chew 1 Tablet every day.    Coronary artery disease involving native coronary artery of native heart without angina pectoris  Stable.  Continue as above  Orders:    atorvastatin (LIPITOR) 40 MG Tab; Take 1 Tablet by mouth every evening.    Atherosclerosis of aorta (HCC)  As above  Orders:    aspirin (ASA) 81 MG Chew Tab chewable tablet; Chew 1 Tablet every day.

## 2025-01-17 LAB
GLIADIN IGA SER IA-ACNC: <0.72 FLU (ref 0–4.99)
GLIADIN IGG SER IA-ACNC: 5.35 FLU (ref 0–4.99)
TTG IGA SER IA-ACNC: <1.02 FLU (ref 0–4.99)
TTG IGG SER IA-ACNC: 1.27 FLU (ref 0–4.99)

## 2025-01-24 ENCOUNTER — HOSPITAL ENCOUNTER (OUTPATIENT)
Facility: MEDICAL CENTER | Age: 80
End: 2025-01-24
Attending: INTERNAL MEDICINE
Payer: MEDICARE

## 2025-01-24 PROCEDURE — 87329 GIARDIA AG IA: CPT

## 2025-01-24 PROCEDURE — 87899 AGENT NOS ASSAY W/OPTIC: CPT

## 2025-01-24 PROCEDURE — 87045 FECES CULTURE AEROBIC BACT: CPT

## 2025-01-24 PROCEDURE — 87328 CRYPTOSPORIDIUM AG IA: CPT

## 2025-01-24 PROCEDURE — 87338 HPYLORI STOOL AG IA: CPT

## 2025-01-24 PROCEDURE — 87046 STOOL CULTR AEROBIC BACT EA: CPT

## 2025-01-24 PROCEDURE — 87493 C DIFF AMPLIFIED PROBE: CPT

## 2025-01-24 PROCEDURE — 82272 OCCULT BLD FECES 1-3 TESTS: CPT

## 2025-01-25 DIAGNOSIS — K52.9 CHRONIC DIARRHEA: ICD-10-CM

## 2025-01-25 LAB
C DIFF DNA SPEC QL NAA+PROBE: NEGATIVE
C DIFF TOX GENS STL QL NAA+PROBE: NEGATIVE
C PARVUM AG STL QL IA.RAPID: NEGATIVE
G LAMBLIA AG STL QL IA.RAPID: NEGATIVE
H PYLORI AG STL QL IA: NOT DETECTED
HEMOCCULT STL QL: NEGATIVE

## 2025-01-26 LAB
E COLI SXT1+2 STL IA: NORMAL
SIGNIFICANT IND 70042: NORMAL
SITE SITE: NORMAL
SOURCE SOURCE: NORMAL

## 2025-01-28 ENCOUNTER — OFFICE VISIT (OUTPATIENT)
Dept: MEDICAL GROUP | Age: 80
End: 2025-01-28
Payer: MEDICARE

## 2025-01-28 VITALS
DIASTOLIC BLOOD PRESSURE: 62 MMHG | BODY MASS INDEX: 23.04 KG/M2 | OXYGEN SATURATION: 96 % | HEIGHT: 68 IN | WEIGHT: 152 LBS | TEMPERATURE: 98.5 F | HEART RATE: 64 BPM | SYSTOLIC BLOOD PRESSURE: 108 MMHG

## 2025-01-28 DIAGNOSIS — R09.81 CONGESTION OF NASAL SINUS: ICD-10-CM

## 2025-01-28 DIAGNOSIS — E03.4 HYPOTHYROIDISM DUE TO ACQUIRED ATROPHY OF THYROID: ICD-10-CM

## 2025-01-28 DIAGNOSIS — J06.9 ACUTE UPPER RESPIRATORY INFECTION, UNSPECIFIED: ICD-10-CM

## 2025-01-28 DIAGNOSIS — I25.10 CORONARY ARTERY DISEASE INVOLVING NATIVE CORONARY ARTERY OF NATIVE HEART WITHOUT ANGINA PECTORIS: ICD-10-CM

## 2025-01-28 DIAGNOSIS — R19.7 ACUTE DIARRHEA: ICD-10-CM

## 2025-01-28 PROBLEM — R41.0 CHRONIC CONFUSION: Status: RESOLVED | Noted: 2024-08-20 | Resolved: 2025-01-28

## 2025-01-28 LAB
BACTERIA STL CULT: NORMAL
E COLI SXT1+2 STL IA: NORMAL
FLUAV RNA SPEC QL NAA+PROBE: NEGATIVE
FLUBV RNA SPEC QL NAA+PROBE: NEGATIVE
RSV RNA SPEC QL NAA+PROBE: NEGATIVE
SARS-COV-2 RNA RESP QL NAA+PROBE: NEGATIVE
SIGNIFICANT IND 70042: NORMAL
SITE SITE: NORMAL
SOURCE SOURCE: NORMAL

## 2025-01-28 PROCEDURE — 3074F SYST BP LT 130 MM HG: CPT | Performed by: INTERNAL MEDICINE

## 2025-01-28 PROCEDURE — 99214 OFFICE O/P EST MOD 30 MIN: CPT | Performed by: INTERNAL MEDICINE

## 2025-01-28 PROCEDURE — 0241U POCT CEPHEID COV-2, FLU A/B, RSV - PCR: CPT | Performed by: INTERNAL MEDICINE

## 2025-01-28 PROCEDURE — 3078F DIAST BP <80 MM HG: CPT | Performed by: INTERNAL MEDICINE

## 2025-01-28 RX ORDER — LEVOTHYROXINE SODIUM 137 UG/1
137 TABLET ORAL
Qty: 100 TABLET | Refills: 2 | Status: SHIPPED | OUTPATIENT
Start: 2025-01-28

## 2025-01-28 ASSESSMENT — ENCOUNTER SYMPTOMS
PSYCHIATRIC NEGATIVE: 1
NEUROLOGICAL NEGATIVE: 1
RESPIRATORY NEGATIVE: 1
MUSCULOSKELETAL NEGATIVE: 1
CONSTITUTIONAL NEGATIVE: 1
EYES NEGATIVE: 1
GASTROINTESTINAL NEGATIVE: 1
CARDIOVASCULAR NEGATIVE: 1

## 2025-01-28 ASSESSMENT — FIBROSIS 4 INDEX: FIB4 SCORE: 1.71

## 2025-01-28 NOTE — PROGRESS NOTES
Subjective     Dusty Eid is a 79 y.o. male who presents with Follow-Up (Lab review )  Patient is here for 2-week follow-up visit of his 1 month history of diarrhea that was loose and watery which has subsequently resolved.  He is concerned that this may be due to his B12 level of 1700, but I reassured him it was not and that possibly the PPI therapy omeprazole may have contributed or could have just been a simple virus that was undetectable on stool specimens.  Stools were all negative for    Negative stool C. difficile, ova and parasites, culture, occult blood, H. pylori, and antigliadin antibodies, Shigella toxin and Cryptosporidium and Giardia     2.               Rest of laboratory studies were normal however for the last 24 hours nasal congestion cough and runny nose and no sputum production.  No chest pain or dyspnea fever chills or myalgias or headache.    History of Present Illness  The patient is a 79-year-old male who presents for evaluation of diarrhea, cold symptoms, and low thyroid hormone.    He has been experiencing severe diarrhea for the past 2 months, which he attributes to elevated B12 levels. Despite discontinuing all vitamin supplements, the diarrhea persists, although with less severity. He has not sought specialist care for this issue. He has been taking a vitamin supplement containing B12 for approximately 2 months but has since discontinued its use. He has been on omeprazole for an extended period and also takes an over-the-counter medication for heartburn or indigestion. He has undergone stool testing, which was negative for C. difficile, H. pylori, Shigella, and Giardia. He reports that his diarrhea has improved, with fewer daily episodes, and he has not had a bowel movement today.    He has been on a consistent dose of thyroid replacement hormone (125 mcg) for an extended period without any missed doses.    He reports the onset of cold symptoms last night, including nasal  congestion, runny nose, and a persistent cough. He has been using NyQuil to manage these symptoms. He received his influenza vaccine at [facility] and was in close contact with an individual who had a cold a few weeks prior.    MEDICATIONS  Current: omeprazole, NyQuil    IMMUNIZATIONS  He has had his influenza vaccine.    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 90 Tablet 3    aspirin (ASA) 81 MG Chew Tab chewable tablet Chew 1 Tablet every day. 100 Tablet 3    sildenafil citrate (VIAGRA) 100 MG tablet Take one tablet by mouth as needed for erectile dysfunction 180 Tablet 1    azelastine (ASTELIN) 137 MCG/SPRAY nasal spray Administer 1 Spray into affected nostril(S) 2 times a day. 30 mL 2    cetirizine (ZYRTEC) 10 MG Tab Take 1 Tablet by mouth every day. 30 Tablet 3    fluticasone (FLONASE) 50 MCG/ACT nasal spray Administer 1 Spray into affected nostril(S) 2 times a day. 16 g 3    Plant Sterols and Stanols (CHOLESTOFF PLUS PO) Take  by mouth 2 times a day.      Flaxseed, Linseed, (FLAX SEED OIL PO) Take 1,400 mg by mouth every day.      Omega-3 Fatty Acids (FISH OIL) 1200 MG Cap Take  by mouth 2 times a day.      Ascorbic Acid (SUPER C COMPLEX PO) Take  by mouth every day.      Zinc 50 MG Tab Take 50 mg by mouth every day.      Omeprazole Magnesium  (PRILOSEC PO) Take  by mouth every day.      Turmeric (QC TUMERIC COMPLEX PO) Take  by mouth 2 times a day.      levothyroxine (SYNTHROID) 125 MCG Tab Take 1 Tablet by mouth every morning on an empty stomach. 90 Tablet 2    Cyanocobalamin (VITAMIN B 12) 500 MCG Tab Take 500 mcg by mouth 2 times a day. 100 Tablet 3     No facility-administered medications prior to visit.     Hospital Outpatient Visit on 01/24/2025   Component Date Value    Occult Blood Feces 01/24/2025 Negative     Significant Indicator 01/24/2025 NEG     Source 01/24/2025 STL     Site 01/24/2025 STOOL     Culture Result 01/24/2025                      Value:No enteric pathogens isolated.  NOTE:  Stool cultures are screened for Shiga Toxins 1 and 2,  Salmonella, Shigella, Campylobacter, Aeromonas,  Plesiomonas, and Vibrio.      [facility] 01/24/2025 Negative for Shiga Toxin 1 and 2.     H Pylori Ag Stool E 01/24/2025 Not Detected     C Diff by PCR 01/24/2025 Negative     [id number] Presumptive 01/24/2025 Negative     Cryptosporidium Antigen 01/24/2025 Negative     Giardia Antigen 01/24/2025 Negative     Significant Indicator 01/24/2025 NEG     Source 01/24/2025 STL     Site 01/24/2025 STOOL     [facility] 01/24/2025 Negative for Shiga Toxin 1 and 2.    Hospital Outpatient Visit on 01/15/2025   Component Date Value    t-TG IgA 01/15/2025 <1.02     Sed Rate Westergren 01/15/2025 14     WBC 01/15/2025 7.7     RBC 01/15/2025 4.13 (L)     Hemoglobin 01/15/2025 13.5 (L)     Hematocrit 01/15/2025 39.4 (L)     MCV 01/15/2025 95.4     MCH 01/15/2025 32.7     MCHC 01/15/2025 34.3     RDW 01/15/2025 47.4     Platelet Count 01/15/2025 283     MPV 01/15/2025 9.8     Neutrophils-Polys 01/15/2025 60.70     Lymphocytes 01/15/2025 25.70     Monocytes 01/15/2025 10.00     Eosinophils 01/15/2025 2.70     Basophils 01/15/2025 0.50     Immature Granulocytes 01/15/2025 0.40     Nucleated RBC 01/15/2025 0.00     Neutrophils (Absolute) 01/15/2025 4.66     Lymphs (Absolute) 01/15/2025  "1.98     Monos (Absolute) 01/15/2025 0.77     Eos (Absolute) 01/15/2025 0.21     Baso (Absolute) 01/15/2025 0.04     Immature Granulocytes (a* 01/15/2025 0.03     NRBC (Absolute) 01/15/2025 0.00     Gliadin Antibodies Iga 01/15/2025 <0.72     t-TG IgG 01/15/2025 1.27     Gliadin Antibodies Igg 01/15/2025 5.35 (H)    Hospital Outpatient Visit on 01/13/2025   Component Date Value    Folate -Folic Acid 01/13/2025 14.9     Vitamin B12 -True Cobala* 01/13/2025 1719 (H)     Sodium 01/13/2025 140     Potassium 01/13/2025 4.0     Chloride 01/13/2025 106     Co2 01/13/2025 23     Anion Gap 01/13/2025 11.0     Glucose 01/13/2025 89     Bun 01/13/2025 15     Creatinine 01/13/2025 1.00     Calcium 01/13/2025 9.1     Correct Calcium 01/13/2025 9.0     AST(SGOT) 01/13/2025 28     ALT(SGPT) 01/13/2025 21     Alkaline Phosphatase 01/13/2025 63     Total Bilirubin 01/13/2025 0.6     Albumin 01/13/2025 4.1     Total Protein 01/13/2025 6.8     Globulin 01/13/2025 2.7     A-G Ratio 01/13/2025 1.5     Cholesterol,Tot 01/13/2025 145     Triglycerides 01/13/2025 92     HDL 01/13/2025 46     LDL 01/13/2025 81     TSH 01/13/2025 9.740 (H)     GFR (CKD-EPI) 01/13/2025 76       Lab Results   Component Value Date/Time    HBA1C 5.8 (H) 06/24/2024 07:43 AM    HBA1C 5.9 (H) 12/27/2023 10:53 AM     Lab Results   Component Value Date/Time    SODIUM 140 01/13/2025 07:14 AM    POTASSIUM 4.0 01/13/2025 07:14 AM    CHLORIDE 106 01/13/2025 07:14 AM    CO2 23 01/13/2025 07:14 AM    GLUCOSE 89 01/13/2025 07:14 AM    BUN 15 01/13/2025 07:14 AM    CREATININE 1.00 01/13/2025 07:14 AM    CREATININE 1.15 12/09/2010 07:25 AM    BUNCREATRAT 16 12/09/2010 07:25 AM    GLOMRATE >59 12/09/2010 07:25 AM    ALKPHOSPHAT 63 01/13/2025 07:14 AM    ASTSGOT 28 01/13/2025 07:14 AM    ALTSGPT 21 01/13/2025 07:14 AM    TBILIRUBIN 0.6 01/13/2025 07:14 AM     No results found for: \"INR\"  Lab Results   Component Value Date/Time    CHOLSTRLTOT 145 01/13/2025 07:14 AM    LDL 81 " "01/13/2025 07:14 AM    HDL 46 01/13/2025 07:14 AM    TRIGLYCERIDE 92 01/13/2025 07:14 AM       Lab Results   Component Value Date/Time    TESTOSTERONE 695 06/30/2014 10:29 AM     Lab Results   Component Value Date/Time    TSH 20.800 (H) 03/29/2011 11:33 AM    TSH 20.440 (H) 12/09/2010 07:25 AM     Lab Results   Component Value Date/Time    FREET4 1.43 02/02/2024 11:17 AM    FREET4 1.59 08/09/2022 06:45 AM     No results found for: \"URICACID\"  No components found for: \"VITB12\"  Lab Results   Component Value Date/Time    25HYDROXY 28 (L) 06/24/2024 07:43 AM    25HYDROXY 32 12/27/2023 10:53 AM   '            HPI    Review of Systems   Constitutional: Negative.    HENT: Negative.     Eyes: Negative.    Respiratory: Negative.     Cardiovascular: Negative.    Gastrointestinal: Negative.    Genitourinary: Negative.    Musculoskeletal: Negative.    Skin: Negative.    Neurological: Negative.    Endo/Heme/Allergies: Negative.    Psychiatric/Behavioral: Negative.                Objective     /62 (BP Location: Right arm, Patient Position: Sitting, BP Cuff Size: Adult)   Pulse 64   Temp 36.9 °C (98.5 °F) (Temporal)   Ht 1.727 m (5' 8\")   Wt 68.9 kg (152 lb)   SpO2 96%   BMI 23.11 kg/m²      Physical Exam  Constitutional:       General: He is not in acute distress.     Appearance: He is well-developed. He is not diaphoretic.   HENT:      Head: Normocephalic and atraumatic.      Right Ear: External ear normal.      Left Ear: External ear normal.      Nose: Nose normal.      Mouth/Throat:      Pharynx: No oropharyngeal exudate.   Eyes:      General: No scleral icterus.        Right eye: No discharge.         Left eye: No discharge.      Conjunctiva/sclera: Conjunctivae normal.      Pupils: Pupils are equal, round, and reactive to light.   Neck:      Thyroid: No thyromegaly.      Vascular: No JVD.      Trachea: No tracheal deviation.   Cardiovascular:      Rate and Rhythm: Normal rate and regular rhythm.      Heart " sounds: Normal heart sounds. No murmur heard.     No friction rub. No gallop.   Pulmonary:      Effort: Pulmonary effort is normal. No respiratory distress.      Breath sounds: Normal breath sounds. No stridor. No wheezing or rales.   Chest:      Chest wall: No tenderness.   Abdominal:      General: Bowel sounds are normal. There is no distension.      Palpations: Abdomen is soft. There is no mass.      Tenderness: There is no abdominal tenderness. There is no guarding or rebound.   Musculoskeletal:         General: No tenderness. Normal range of motion.      Cervical back: Normal range of motion and neck supple.   Lymphadenopathy:      Cervical: No cervical adenopathy.   Skin:     General: Skin is warm and dry.      Coloration: Skin is not pale.      Findings: No erythema or rash.   Neurological:      Mental Status: He is alert and oriented to person, place, and time.      Motor: No abnormal muscle tone.      Coordination: Coordination normal.      Deep Tendon Reflexes: Reflexes are normal and symmetric. Reflexes normal.   Psychiatric:         Behavior: Behavior normal.         Thought Content: Thought content normal.         Judgment: Judgment normal.                             Assessment & Plan        Assessment & Plan  Acute diarrhea-resolving  This is resolved.  No diarrhea today.  Most likely viral gastroenteritis which is resolved.  Stools are all negative for culture, C. difficile, O&P, and celiac panel.       Congestion of nasal sinus  New problem for 24 hours.  Probably viral syndrome.  Treat according to results of nasal swab below.  No need for antibiotics but might treat with antiviral's if positive for influenza or COVID.  Orders:    POCT Cepheid CoV-2, Flu A/B, RSV - PCR    Acute upper respiratory infection, unspecified  As above  Orders:    POCT Cepheid CoV-2, Flu A/B, RSV - PCR    Hypothyroidism due to acquired atrophy of thyroid  Not at goal with a TSH of 9.  Increase Synthroid to 137 mcg daily  and recheck labs in 6 months  Orders:    levothyroxine (SYNTHROID) 137 MCG Tab; Take 1 Tablet by mouth every morning on an empty stomach.    Coronary artery disease involving native coronary artery of native heart without angina pectoris-  negative cardiac PET/CT October 2022  Patient has markedly elevated coronary calcification scoring over 400 but negative PET/CT of the myocardium.  Still however patient is at risk for significant coronary artery disease with a 10-year cardiac risk score of 18.9%.  Continue with statin Lipitor 40 mg daily and aspirin 81 mg daily.       Assessment & Plan  1. Diarrhea.  The elevated B12 levels are unlikely to be the cause of his diarrhea. The persistent diarrhea could be attributed to his regular use of omeprazole or a potential infection. However, all tests for infections, including C. difficile, H. pylori, and Shigella, have returned negative results. His condition appears to be improving, suggesting that the diarrhea may have been a symptom of a viral infection that is now resolving. He is not anemic. He is advised to discontinue the use of B12 supplements. He should only take omeprazole as needed for indigestion or heartburn, rather than on a daily basis, to prevent further episodes of diarrhea.    2. Low thyroid hormone.  His TSH levels indicate a deficiency in thyroid hormone. The dosage of his thyroid medication will be increased from 125 mcg to 137 mcg.    3. Cold symptoms.  He reports symptoms of congestion, runny nose, and a tickle in his throat that started last night. He has had a flu vaccine, but it is still possible he contracted a virus. A nasal swab was collected for testing. He is advised to let the illness run its course and avoid antibiotics, as they can cause diarrhea. If the nasal swab shows influenza or COVID-19, appropriate treatment will be provided.    Follow-up  The patient will follow up in 6 months.

## 2025-01-29 NOTE — ASSESSMENT & PLAN NOTE
History of Present Illness


Service


Neurosurgery


Consult Requested By


Medicine service


Reason for Consult


Subarachnoid hemorrhage


Primary Care Physician


Non-Staff


Diagnoses:  


History of Present Illness


The patient is a 63-year-old female he was just discharged from the hospital on 

5/25/2018 following admission for chest pain, with subsequent cardiology 

clearance.


The patient states that since approximately November 2017 she has experienced 

generalized fatigue and malaise along with weight loss.


She states that in December 2017 her doctor thought she may have had a small 

stroke due to facial droop.


She states that after being discharged from the hospital yesterday she has 

significant nausea and vomiting.  She also developed headache along with left 

upper and lower extremity weakness.  She states that her systolic blood 

pressure was up to 240 on this past Wednesday.  Her vital signs noted in the 

medical records indicate that she was in the 180s-190s systolic range for much 

of the day on 520 4/18 through 5/25/18 as well as blood pressure documented is 

186/99 earlier today.


She denies any significant problems with vertigo.  No definite confusion.





Review of Systems


Constitutional:  COMPLAINS OF: Fatigue, DENIES: Fever


Eyes:  DENIES: Blurred vision, Diplopia


Ears, nose, mouth, throat:  DENIES: Hearing loss, Vertigo


Respiratory:  COMPLAINS OF: Shortness of breath


Cardiovascular:  DENIES: Chest pain


Gastrointestinal:  COMPLAINS OF: Nausea, Vomiting, DENIES: Diarrhea


Musculoskeletal:  COMPLAINS OF: Joint pain, Muscle aches, Back pain, Neck pain


Hematologic/lymphatic:  DENIES: Bruising


Neurologic:  COMPLAINS OF: Abnormal gait, Headache, Localized weakness, 

Paresthesias, Poor Balance


Psychiatric:  DENIES: Confusion





Past Family Social History


Allergies:  


Coded Allergies:  


     codeine (Verified  Allergy, Intermediate, 5/22/18)


     nitroglycerin (Verified  Adverse Reaction, Severe, Nausea/Vomiting, 5/23/18

)


Past Medical History


Lupus


Hypertension


Hyperlipidemia


Diabetes


Hypothyroidism


Anemia


Past Surgical History


Appendectomy


Hysterectomy


Bladder surgery


Reported Medications





Reported Meds & Active Scripts


Active


Cozaar (Losartan Potassium) 50 Mg Tab 100 Mg PO DAILY


Prednisone 10 Mg Tab 10 Mg PO DAILY


     Use this only IF Bolus and Taper of prednisone fails.


Prednisone 20 Mg Tab 20 Mg PO BID


     Use this treatment first, then use taper treatment.


Prednisone 10 Mg Tab 10 Mg PO AS DIRECTED


     Taper Treatment:


     1 pill twice a day for Day 1-2


     1 pill once a day for Day 3-4


     1/2 pill once a day for Day 5-8


     Then stop


Nystatin-Triamcinolone 100,000-0.1 Unit/Gm Oint 1 Applic TOPICAL Q12HR


Tramadol (Tramadol HCl) 50 Mg Tab 50 Mg PO Q8H PRN


Reported


Levothyroxine (Levothyroxine Sodium) 50 Mcg Tab 50 Mcg PO DAILY


Metformin (Metformin HCl) 500 Mg Tab 500 Mg PO BIDPC


Aspirin Low Dose (Aspirin) 81 Mg Chew 81 Mg CHEW DAILY


Lovastatin 20 Mg Tab 20 Mg PO DAILY


Metoprolol Tartrate 25 Mg Tab 25 Mg PO BID


Losartan (Losartan Potassium) 50 Mg Tab 50 Mg PO DAILY


Family History


No history of lupus in the family.


Coronary artery disease in her mother and father


Social History


Does not smoke cigarettes or drink alcohol





Physical Exam


Vital Signs





Vital Signs








  Date Time  Temp Pulse Resp B/P (MAP) Pulse Ox O2 Delivery O2 Flow Rate FiO2


 


5/26/18 20:00 97.7 86 18 173/103 (126) 99   


 


5/26/18 11:31 97.2 61 16 128/94 (105) 100   


 


5/26/18 08:00 98.0 67 18 171/86 (114) 99   


 


5/26/18 04:00 97.4 62 18 179/94 (122) 100   


 


5/26/18 01:13    167/75 (105)    


 


5/26/18 00:30 97.5 64 18 186/99 (128) 100   


 


5/25/18 23:34 97.9 64 16 161/79 (106) 100   








Physical Exam


GENERAL: Very frail cachectic appearing lady


SKIN: Dry, poor skin turgor and integrity.


HEAD: Atraumatic. Normocephalic. No temporal or scalp tenderness.


EYES: Moderate scleral and conjunctival edema and ecchymosis left eye and to 

minimal extent right eye


ENT: No CSF otorrhea or rhinorrhea.  Oropharynx clear


NECK: Mild neck tenderness.  Approximately 45 right and left rotation


CARDIOVASCULAR: Pulse regular


RESPIRATORY: Respirations clear nonlabored


GASTROINTESTINAL: Abdomen soft, non-tender, nondistended. 


MUSCULOSKELETAL: Significant muscle wasting all extremities.  Moderate 

arthritic changes in the hands


NEUROLOGICAL:


Awake and relatively alert


Moderate slowing of speech and thought processes


Speech is very soft


Answers most simple questions appropriately and follows simple commands 

relatively well.


Her speech is very slow, somewhat difficult to understand, partly because of 

very soft voice.


Pupils are midrange reactive to accommodation.


Extraocular movements visual fields confrontation facial sensorimotor tongue, 

sternocleidomastoid testing hearing to finger rub bilateral shoulder shrug are 

all intact


Sensation is intact to light touch in the upper and lower extremities


Strength is diminished to 2/5 left triceps and hand intrinsics with 3/5 

proximal left lower extremity motor function.


Left tibialis anterior is absent with 2/5 left gastrocsoleus.


Strength within normal limits right lower extremity major flexion-extension 

groups


Doss's response absent bilateral


No ankle clonus


Plantar responses are neutral


Laboratory





Laboratory Tests








Test


  5/26/18


06:37


 


White Blood Count 5.4 


 


Red Blood Count 4.57 


 


Hemoglobin 13.0 


 


Hematocrit 38.6 


 


Mean Corpuscular Volume 84.5 


 


Mean Corpuscular Hemoglobin 28.5 


 


Mean Corpuscular Hemoglobin


Concent 33.8 


 


 


Red Cell Distribution Width 16.8 


 


Platelet Count 106 


 


Mean Platelet Volume 9.1 


 


Neutrophils (%) (Auto) 86.5 


 


Lymphocytes (%) (Auto) 5.5 


 


Monocytes (%) (Auto) 7.8 


 


Eosinophils (%) (Auto) 0.1 


 


Basophils (%) (Auto) 0.1 


 


Neutrophils # (Auto) 4.7 


 


Lymphocytes # (Auto) 0.3 


 


Monocytes # (Auto) 0.4 


 


Eosinophils # (Auto) 0.0 


 


Basophils # (Auto) 0.0 


 


CBC Comment DIFF FINAL 


 


Differential Comment  


 


Blood Urea Nitrogen 9 


 


Creatinine 0.29 


 


Random Glucose 101 


 


Total Protein 6.5 


 


Calcium Level 7.4 


 


Magnesium Level 1.7 


 


Sodium Level 129 


 


Potassium Level 3.6 


 


Chloride Level 98 


 


Carbon Dioxide Level 21.4 


 


Anion Gap 10 


 


Estimat Glomerular Filtration


Rate 234 


 


 


Protein Corrected Calcium 7.7 








Result Diagram:  


5/26/18 0637                                                                   

             5/26/18 0637





Imaging





Last Impressions








Head CT 5/26/18 0000 Signed





Impressions: 





 CONCLUSION:





 1.  Negative CT Head non contrast.





  





 


 


Brain MRI 5/26/18 0000 Signed





Impressions: 





 CONCLUSION: 





 1.  FLAIR T2 hyperintensity along the cortical surface of the occipital lobes c





 haracteristic of minimal subarachnoid blood.





 2.  Trace blood in the ventricles.





 3.  No evidence of acute infarct, parenchymal hemorrhage, mass or edema.





 4.  Cerebral white matter disease characteristic of mild microvascular angiopat





 hy.





  





 


 


Renal Ultrasound 5/25/18 0000 Signed





Impressions: 





 CONCLUSION: 





 1.  Left renal abnormality noted on CT exam corresponds to a 1.5 x 1.5 x 1.5 cm





  simple appearing cyst by ultrasound criteria.





  





 


 


Aorta CTA 5/25/18 0000 Signed





Impressions: 





 CONCLUSION:





 1.  Negative CTA Thoraco Abdominal Aorta.





 2.  No evidence of aneurysm, intimal dissection or inflammatory vascular diseas





 e.





  





 


 


Abdomen X-Ray 5/24/18 0000 Signed





Impressions: 





 CONCLUSION:





 No acute findings. No dilated loops of small or large bowel.





  





 











Assessment and Plan


Assessment and Plan


Impression:


1.  Mild subarachnoid hemorrhage-intraventricular hemorrhage noted on MRI.  

Likely sequela of hypertension, possible contribution of diabetes to underlying 

microvascular disease.  Cannot totally rule out amyloid angiopathy.


2.  History of lupus


3.  Diabetes





Plan: Findings were discussed at length with the patient.


CT angiogram of the head is recommended to more clearly delineate any 

significant vascular changes including signs of vasculitis 


Also MRI cervical spine to assess for possible cord compression-myelopathy.


Continue physical therapy, mobilize out of bed as tolerated


DVT prophylaxis











Jan Muñoz MD May 26, 2018 23:09 Patient has markedly elevated coronary calcification scoring over 400 but negative PET/CT of the myocardium.  Still however patient is at risk for significant coronary artery disease with a 10-year cardiac risk score of 18.9%.  Continue with statin Lipitor 40 mg daily and aspirin 81 mg daily.       Assessment & Plan  1. Diarrhea.  The elevated B12 levels are unlikely to be the cause of his diarrhea. The persistent diarrhea could be attributed to his regular use of omeprazole or a potential infection. However, all tests for infections, including C. difficile, H. pylori, and Shigella, have returned negative results. His condition appears to be improving, suggesting that the diarrhea may have been a symptom of a viral infection that is now resolving. He is not anemic. He is advised to discontinue the use of B12 supplements. He should only take omeprazole as needed for indigestion or heartburn, rather than on a daily basis, to prevent further episodes of diarrhea.    2. Low thyroid hormone.  His TSH levels indicate a deficiency in thyroid hormone. The dosage of his thyroid medication will be increased from 125 mcg to 137 mcg.    3. Cold symptoms.  He reports symptoms of congestion, runny nose, and a tickle in his throat that started last night. He has had a flu vaccine, but it is still possible he contracted a virus. A nasal swab was collected for testing. He is advised to let the illness run its course and avoid antibiotics, as they can cause diarrhea. If the nasal swab shows influenza or COVID-19, appropriate treatment will be provided.    Follow-up  The patient will follow up in 6 months.

## 2025-01-29 NOTE — ASSESSMENT & PLAN NOTE
Not at goal with a TSH of 9.  Increase Synthroid to 137 mcg daily and recheck labs in 6 months  Orders:    levothyroxine (SYNTHROID) 137 MCG Tab; Take 1 Tablet by mouth every morning on an empty stomach.

## 2025-02-12 ENCOUNTER — APPOINTMENT (OUTPATIENT)
Dept: MEDICAL GROUP | Age: 80
End: 2025-02-12
Payer: MEDICARE

## 2025-07-30 ENCOUNTER — HOSPITAL ENCOUNTER (OUTPATIENT)
Dept: LAB | Facility: MEDICAL CENTER | Age: 80
End: 2025-07-30
Attending: INTERNAL MEDICINE
Payer: MEDICARE

## 2025-07-30 ENCOUNTER — OFFICE VISIT (OUTPATIENT)
Dept: MEDICAL GROUP | Age: 80
End: 2025-07-30
Payer: MEDICARE

## 2025-07-30 VITALS
SYSTOLIC BLOOD PRESSURE: 108 MMHG | WEIGHT: 155 LBS | DIASTOLIC BLOOD PRESSURE: 60 MMHG | BODY MASS INDEX: 23.49 KG/M2 | HEART RATE: 45 BPM | OXYGEN SATURATION: 98 % | HEIGHT: 68 IN | TEMPERATURE: 98.3 F

## 2025-07-30 DIAGNOSIS — N40.0 BPH WITHOUT URINARY OBSTRUCTION: ICD-10-CM

## 2025-07-30 DIAGNOSIS — E78.5 DYSLIPIDEMIA: ICD-10-CM

## 2025-07-30 DIAGNOSIS — J30.1 NON-SEASONAL ALLERGIC RHINITIS DUE TO POLLEN: ICD-10-CM

## 2025-07-30 DIAGNOSIS — R93.1 ABNORMAL HEART SCORE CT: ICD-10-CM

## 2025-07-30 DIAGNOSIS — E55.9 VITAMIN D DEFICIENCY: ICD-10-CM

## 2025-07-30 DIAGNOSIS — N52.9 VASCULOGENIC ERECTILE DYSFUNCTION, UNSPECIFIED VASCULOGENIC ERECTILE DYSFUNCTION TYPE: ICD-10-CM

## 2025-07-30 DIAGNOSIS — R73.01 IFG (IMPAIRED FASTING GLUCOSE): ICD-10-CM

## 2025-07-30 DIAGNOSIS — Z98.890 STATUS POST COLONOSCOPY: ICD-10-CM

## 2025-07-30 DIAGNOSIS — I70.0 ATHEROSCLEROSIS OF AORTA (HCC): ICD-10-CM

## 2025-07-30 DIAGNOSIS — I25.10 CORONARY ARTERY DISEASE INVOLVING NATIVE CORONARY ARTERY OF NATIVE HEART WITHOUT ANGINA PECTORIS: ICD-10-CM

## 2025-07-30 DIAGNOSIS — E03.4 HYPOTHYROIDISM DUE TO ACQUIRED ATROPHY OF THYROID: ICD-10-CM

## 2025-07-30 DIAGNOSIS — E55.9 VITAMIN D DEFICIENCY: Primary | ICD-10-CM

## 2025-07-30 LAB
25(OH)D3 SERPL-MCNC: 35 NG/ML (ref 30–100)
ALBUMIN SERPL BCP-MCNC: 3.8 G/DL (ref 3.2–4.9)
ALBUMIN/GLOB SERPL: 1.4 G/DL
ALP SERPL-CCNC: 61 U/L (ref 30–99)
ALT SERPL-CCNC: 23 U/L (ref 2–50)
ANION GAP SERPL CALC-SCNC: 10 MMOL/L (ref 7–16)
AST SERPL-CCNC: 33 U/L (ref 12–45)
BASOPHILS # BLD AUTO: 0.9 % (ref 0–1.8)
BASOPHILS # BLD: 0.07 K/UL (ref 0–0.12)
BILIRUB SERPL-MCNC: 0.4 MG/DL (ref 0.1–1.5)
BUN SERPL-MCNC: 11 MG/DL (ref 8–22)
CALCIUM ALBUM COR SERPL-MCNC: 9.5 MG/DL (ref 8.5–10.5)
CALCIUM SERPL-MCNC: 9.3 MG/DL (ref 8.5–10.5)
CHLORIDE SERPL-SCNC: 107 MMOL/L (ref 96–112)
CHOLEST SERPL-MCNC: 143 MG/DL (ref 100–199)
CO2 SERPL-SCNC: 23 MMOL/L (ref 20–33)
CREAT SERPL-MCNC: 1.02 MG/DL (ref 0.5–1.4)
EOSINOPHIL # BLD AUTO: 0.31 K/UL (ref 0–0.51)
EOSINOPHIL NFR BLD: 4.1 % (ref 0–6.9)
ERYTHROCYTE [DISTWIDTH] IN BLOOD BY AUTOMATED COUNT: 49 FL (ref 35.9–50)
EST. AVERAGE GLUCOSE BLD GHB EST-MCNC: 114 MG/DL
FASTING STATUS PATIENT QL REPORTED: NORMAL
GFR SERPLBLD CREATININE-BSD FMLA CKD-EPI: 74 ML/MIN/1.73 M 2
GLOBULIN SER CALC-MCNC: 2.8 G/DL (ref 1.9–3.5)
GLUCOSE SERPL-MCNC: 101 MG/DL (ref 65–99)
HBA1C MFR BLD: 5.6 % (ref 4–5.6)
HCT VFR BLD AUTO: 37.4 % (ref 42–52)
HDLC SERPL-MCNC: 48 MG/DL
HGB BLD-MCNC: 12.2 G/DL (ref 14–18)
IMM GRANULOCYTES # BLD AUTO: 0.02 K/UL (ref 0–0.11)
IMM GRANULOCYTES NFR BLD AUTO: 0.3 % (ref 0–0.9)
LDLC SERPL CALC-MCNC: 75 MG/DL
LYMPHOCYTES # BLD AUTO: 1.34 K/UL (ref 1–4.8)
LYMPHOCYTES NFR BLD: 17.9 % (ref 22–41)
MCH RBC QN AUTO: 31.3 PG (ref 27–33)
MCHC RBC AUTO-ENTMCNC: 32.6 G/DL (ref 32.3–36.5)
MCV RBC AUTO: 95.9 FL (ref 81.4–97.8)
MONOCYTES # BLD AUTO: 0.86 K/UL (ref 0–0.85)
MONOCYTES NFR BLD AUTO: 11.5 % (ref 0–13.4)
NEUTROPHILS # BLD AUTO: 4.88 K/UL (ref 1.82–7.42)
NEUTROPHILS NFR BLD: 65.3 % (ref 44–72)
NRBC # BLD AUTO: 0 K/UL
NRBC BLD-RTO: 0 /100 WBC (ref 0–0.2)
PLATELET # BLD AUTO: 314 K/UL (ref 164–446)
PMV BLD AUTO: 9.5 FL (ref 9–12.9)
POTASSIUM SERPL-SCNC: 4.2 MMOL/L (ref 3.6–5.5)
PROT SERPL-MCNC: 6.6 G/DL (ref 6–8.2)
PSA SERPL DL<=0.01 NG/ML-MCNC: 1.39 NG/ML (ref 0–4)
RBC # BLD AUTO: 3.9 M/UL (ref 4.7–6.1)
SODIUM SERPL-SCNC: 140 MMOL/L (ref 135–145)
TRIGL SERPL-MCNC: 100 MG/DL (ref 0–149)
TSH SERPL-ACNC: 0.99 UIU/ML (ref 0.38–5.33)
WBC # BLD AUTO: 7.5 K/UL (ref 4.8–10.8)

## 2025-07-30 PROCEDURE — 36415 COLL VENOUS BLD VENIPUNCTURE: CPT

## 2025-07-30 PROCEDURE — 80061 LIPID PANEL: CPT

## 2025-07-30 PROCEDURE — 83036 HEMOGLOBIN GLYCOSYLATED A1C: CPT | Mod: GA

## 2025-07-30 PROCEDURE — 85025 COMPLETE CBC W/AUTO DIFF WBC: CPT

## 2025-07-30 PROCEDURE — 84443 ASSAY THYROID STIM HORMONE: CPT

## 2025-07-30 PROCEDURE — 80053 COMPREHEN METABOLIC PANEL: CPT

## 2025-07-30 PROCEDURE — 84153 ASSAY OF PSA TOTAL: CPT

## 2025-07-30 PROCEDURE — 82306 VITAMIN D 25 HYDROXY: CPT

## 2025-07-30 RX ORDER — FLUTICASONE PROPIONATE 50 MCG
1 SPRAY, SUSPENSION (ML) NASAL 2 TIMES DAILY
Qty: 16 G | Refills: 3 | Status: SHIPPED | OUTPATIENT
Start: 2025-07-30

## 2025-07-30 RX ORDER — AZELASTINE 1 MG/ML
1 SPRAY, METERED NASAL 2 TIMES DAILY
Qty: 30 ML | Refills: 2 | Status: SHIPPED | OUTPATIENT
Start: 2025-07-30

## 2025-07-30 RX ORDER — ATORVASTATIN CALCIUM 40 MG/1
40 TABLET, FILM COATED ORAL NIGHTLY
Qty: 100 TABLET | Refills: 2 | Status: SHIPPED | OUTPATIENT
Start: 2025-07-30

## 2025-07-30 RX ORDER — SILDENAFIL 100 MG/1
100 TABLET, FILM COATED ORAL 2 TIMES DAILY PRN
Qty: 180 TABLET | Refills: 2 | Status: SHIPPED | OUTPATIENT
Start: 2025-07-30

## 2025-07-30 RX ORDER — ASPIRIN 81 MG/1
81 TABLET, CHEWABLE ORAL DAILY
Qty: 100 TABLET | Refills: 3 | Status: SHIPPED | OUTPATIENT
Start: 2025-07-30

## 2025-07-30 RX ORDER — LEVOTHYROXINE SODIUM 137 UG/1
137 TABLET ORAL
Qty: 100 TABLET | Refills: 2 | Status: SHIPPED | OUTPATIENT
Start: 2025-07-30

## 2025-07-30 ASSESSMENT — ENCOUNTER SYMPTOMS
PSYCHIATRIC NEGATIVE: 1
EYES NEGATIVE: 1
GASTROINTESTINAL NEGATIVE: 1
NEUROLOGICAL NEGATIVE: 1
CONSTITUTIONAL NEGATIVE: 1
MUSCULOSKELETAL NEGATIVE: 1
RESPIRATORY NEGATIVE: 1
CARDIOVASCULAR NEGATIVE: 1

## 2025-07-30 ASSESSMENT — PATIENT HEALTH QUESTIONNAIRE - PHQ9: CLINICAL INTERPRETATION OF PHQ2 SCORE: 0

## 2025-07-30 ASSESSMENT — FIBROSIS 4 INDEX: FIB4 SCORE: 1.71

## 2025-07-30 NOTE — ASSESSMENT & PLAN NOTE
Orders:    levothyroxine (SYNTHROID) 137 MCG Tab; Take 1 Tablet by mouth every morning on an empty stomach.

## 2025-07-30 NOTE — ASSESSMENT & PLAN NOTE
Orders:    atorvastatin (LIPITOR) 40 MG Tab; Take 1 Tablet by mouth every evening.    Comp Metabolic Panel; Future    CBC WITH DIFFERENTIAL; Future    TSH; Future    Lipid Profile; Future

## 2025-07-30 NOTE — PROGRESS NOTES
Subjective     Dusty Eid is a 79 y.o. male who presents with Follow-Up (Wanted to have blood work done [facility] paperwork )    History of Present Illness  The patient presents for medication management and lab review.    Active Lifestyle and General Health  He maintains an active lifestyle, walking approximately 20,000 steps daily. 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 He has previously received influenza vaccines and has completed the COVID-19 vaccine series, including two boosters. He is currently taking vitamin B12 supplements.    Memory Concerns  He has expressed concerns about his memory and is interested in exploring potential treatments.  - Onset: Not specified.  - Character: Concerns about memory.  - Severity: Interested in exploring potential treatments.    Post-Colonoscopy Constipation  He underwent a colonoscopy on 2024, during which small polyps were identified and removed. Post-procedure, he experienced constipation and was prescribed Mylanta and two other medications. He also takes a laxative to manage his bowel movements, as he can go up to a week without one. He is not interested in undergoing another colonoscopy.  - Onset: Post-procedure on 2024.  - Duration: Persistent constipation since the procedure.  - Character: Constipation.  - Alleviating Factors: Prescribed Mylanta, two other medications, and a laxative.  - Severity: Can go up to a week without a bowel movement.    Thyroid Medication Management  He is currently on a daily dose of 137 mcg of thyroid medication.  - Timing: Daily dose.    Tobacco: The patient does not smoke cigarettes.    FAMILY HISTORY  His mother lived to be 92 years old.    Patient Active Problem List    Diagnosis Date Noted    Gastroesophageal reflux disease without esophagitis 01/15/2025    Memory loss, long term 2024    Agatston coronary artery calcium score  "greater than 400 01/04/2024    Risk for falls 05/03/2023    Atherosclerosis of aorta (HCC) 05/03/2023    Dyslipidemia 01/03/2023    Coronary artery disease involving native coronary artery of native heart without angina pectoris-  negative cardiac PET/CT October 2022 11/10/2022    Sinus bradycardia 08/17/2022    Vitamin D deficiency 05/05/2021    Other male erectile dysfunction 07/09/2018    Fatty liver 05/12/2017    IFG (impaired fasting glucose) 03/29/2017    Hypothyroidism due to acquired atrophy of thyroid 12/29/2010     Medications Prior to Visit[1]  No visits with results within 1 Month(s) from this visit.   Latest known visit with results is:   Office Visit on 01/28/2025   Component Date Value    SARS-CoV-2 by PCR 01/28/2025 Negative     Influenza virus A RNA 01/28/2025 Negative     Influenza virus B, PCR 01/28/2025 Negative     RSV, PCR 01/28/2025 Negative       Lab Results   Component Value Date/Time    HBA1C 5.8 (H) 06/24/2024 07:43 AM    HBA1C 5.9 (H) 12/27/2023 10:53 AM     Lab Results   Component Value Date/Time    SODIUM 140 01/13/2025 07:14 AM    POTASSIUM 4.0 01/13/2025 07:14 AM    CHLORIDE 106 01/13/2025 07:14 AM    CO2 23 01/13/2025 07:14 AM    GLUCOSE 89 01/13/2025 07:14 AM    BUN 15 01/13/2025 07:14 AM    CREATININE 1.00 01/13/2025 07:14 AM    CREATININE 1.15 12/09/2010 07:25 AM    BUNCREATRAT 16 12/09/2010 07:25 AM    GLOMRATE >59 12/09/2010 07:25 AM    ALKPHOSPHAT 63 01/13/2025 07:14 AM    ASTSGOT 28 01/13/2025 07:14 AM    ALTSGPT 21 01/13/2025 07:14 AM    TBILIRUBIN 0.6 01/13/2025 07:14 AM     No results found for: \"INR\"  Lab Results   Component Value Date/Time    CHOLSTRLTOT 145 01/13/2025 07:14 AM    LDL 81 01/13/2025 07:14 AM    HDL 46 01/13/2025 07:14 AM    TRIGLYCERIDE 92 01/13/2025 07:14 AM       Lab Results   Component Value Date/Time    TESTOSTERONE 695 06/30/2014 10:29 AM     Lab Results   Component Value Date/Time    TSH 20.800 (H) 03/29/2011 11:33 AM    TSH 20.440 (H) 12/09/2010 " "07:25 AM     Lab Results   Component Value Date/Time    FREET4 1.43 02/02/2024 11:17 AM    FREET4 1.59 08/09/2022 06:45 AM     No results found for: \"URICACID\"  No components found for: \"VITB12\"  Lab Results   Component Value Date/Time    25HYDROXY 28 (L) 06/24/2024 07:43 AM    25HYDROXY 32 12/27/2023 10:53 AM               HPI    Review of Systems   Constitutional: Negative.    HENT: Negative.     Eyes: Negative.    Respiratory: Negative.     Cardiovascular: Negative.    Gastrointestinal: Negative.    Genitourinary: Negative.    Musculoskeletal: Negative.    Skin: Negative.    Neurological: Negative.    Endo/Heme/Allergies: Negative.    Psychiatric/Behavioral: Negative.                Objective     /60 (BP Location: Right arm, Patient Position: Sitting, BP Cuff Size: Adult)   Pulse (!) 45   Temp 36.8 °C (98.3 °F) (Temporal)   Ht 1.727 m (5' 8\")   Wt 70.3 kg (155 lb)   SpO2 98%   BMI 23.57 kg/m²      Physical Exam  Vitals and nursing note reviewed.   Constitutional:       General: He is not in acute distress.     Appearance: He is well-developed. He is not diaphoretic.   HENT:      Head: Normocephalic and atraumatic.      Right Ear: External ear normal.      Left Ear: External ear normal.      Mouth/Throat:      Pharynx: No oropharyngeal exudate.   Eyes:      General:         Right eye: No discharge.      Conjunctiva/sclera: Conjunctivae normal.      Pupils: Pupils are equal, round, and reactive to light.   Neck:      Thyroid: No thyromegaly.      Vascular: No JVD.      Trachea: No tracheal deviation.   Cardiovascular:      Rate and Rhythm: Normal rate and regular rhythm.      Heart sounds: Normal heart sounds.      No gallop.   Pulmonary:      Effort: Pulmonary effort is normal. No respiratory distress.      Breath sounds: Normal breath sounds. No wheezing or rales.   Chest:      Chest wall: No tenderness.   Abdominal:      General: Bowel sounds are normal. There is no distension.      Palpations: " Abdomen is soft. There is no mass.      Tenderness: There is no abdominal tenderness. There is no guarding or rebound.      Hernia: No hernia is present.   Genitourinary:     Penis: No tenderness.       Rectum: Guaiac result negative.   Musculoskeletal:         General: No tenderness.      Cervical back: Normal range of motion and neck supple.   Lymphadenopathy:      Cervical: No cervical adenopathy.   Skin:     General: Skin is warm and dry.      Coloration: Skin is not pale.      Findings: No erythema or rash.   Neurological:      Mental Status: He is alert and oriented to person, place, and time.      Cranial Nerves: No cranial nerve deficit.      Motor: No abnormal muscle tone.      Coordination: Coordination normal.      Deep Tendon Reflexes: Reflexes are normal and symmetric. Reflexes normal.   Psychiatric:         Behavior: Behavior normal.         Thought Content: Thought content normal.         Judgment: Judgment normal.                           Assessment & Plan  1. Health maintenance.  - Blood pressure readings are within the normal range.  - Advised to maintain current lifestyle, including regular social interaction, reading, daily walking, adherence to a Mediterranean diet, weight management, and abstaining from smoking.  - Encouraged to continue vitamin B12 supplementation.  - Advised to receive the RSV vaccine at the pharmacy and schedule influenza and COVID-19 booster shots in the fall.  - Form for driving license renewal will be provided.  - Current medications have been refilled.    2. Constipation.  - Reports using Mylanta to keep regular bowel movements.  - Advised to continue using Mylanta as needed.    3. Thyroid management.  - Thyroid levels were slightly off during the last check.  - Currently taking 137 mcg of thyroid medication daily.  - Blood work will be ordered today to reassess thyroid function.    Follow-up: A follow-up visit is scheduled in 6 months.      1. Hypothyroidism due to  acquired atrophy of thyroid     - levothyroxine (SYNTHROID) 137 MCG Tab; Take 1 Tablet by mouth every morning on an empty stomach.  Dispense: 100 Tablet; Refill: 2    2. Dyslipidemia      - atorvastatin (LIPITOR) 40 MG Tab; Take 1 Tablet by mouth every evening.  Dispense: 100 Tablet; Refill: 2  - Comp Metabolic Panel; Future  - CBC WITH DIFFERENTIAL; Future  - TSH; Future  - Lipid Profile; Future    3. Abnormal Heart Score CT     - atorvastatin (LIPITOR) 40 MG Tab; Take 1 Tablet by mouth every evening.  Dispense: 100 Tablet; Refill: 2  - aspirin (ASA) 81 MG Chew Tab chewable tablet; Chew 1 Tablet every day.  Dispense: 100 Tablet; Refill: 3    4. Coronary artery disease involving native coronary artery of native heart without angina pectoris     - atorvastatin (LIPITOR) 40 MG Tab; Take 1 Tablet by mouth every evening.  Dispense: 100 Tablet; Refill: 2    5. Atherosclerosis of aorta (HCC)     - aspirin (ASA) 81 MG Chew Tab chewable tablet; Chew 1 Tablet every day.  Dispense: 100 Tablet; Refill: 3    6. Non-seasonal allergic rhinitis due to pollen     - azelastine (ASTELIN) 0.1 % nasal spray; Administer 1 Spray into affected nostril(S) 2 times a day.  Dispense: 30 mL; Refill: 2  - fluticasone (FLONASE) 50 MCG/ACT nasal spray; Administer 1 Spray into affected nostril(S) 2 times a day.  Dispense: 16 g; Refill: 3    7. Vasculogenic erectile dysfunction, unspecified vasculogenic erectile dysfunction type     - sildenafil citrate (VIAGRA) 100 MG tablet; Take 1 Tablet by mouth 2 times a day as needed for Erectile Dysfunction (sexual intercource and/or bph).  Dispense: 180 Tablet; Refill: 2    8. Vitamin D deficiency (Primary)     - VITAMIN D,25 HYDROXY (DEFICIENCY); Future    9. IFG (impaired fasting glucose)     - HEMOGLOBIN A1C; Future    10. BPH without urinary obstruction        - PROSTATE SPECIFIC AG DIAGNOSTIC; Future         Assessment & Plan  Hypothyroidism due to acquired atrophy of thyroid    Orders:     levothyroxine (SYNTHROID) 137 MCG Tab; Take 1 Tablet by mouth every morning on an empty stomach.    Dyslipidemia    Orders:    atorvastatin (LIPITOR) 40 MG Tab; Take 1 Tablet by mouth every evening.    Comp Metabolic Panel; Future    CBC WITH DIFFERENTIAL; Future    TSH; Future    Lipid Profile; Future    Abnormal Heart Score CT    Orders:    atorvastatin (LIPITOR) 40 MG Tab; Take 1 Tablet by mouth every evening.    aspirin (ASA) 81 MG Chew Tab chewable tablet; Chew 1 Tablet every day.    Coronary artery disease involving native coronary artery of native heart without angina pectoris    Orders:    atorvastatin (LIPITOR) 40 MG Tab; Take 1 Tablet by mouth every evening.    Atherosclerosis of aorta (HCC)    Orders:    aspirin (ASA) 81 MG Chew Tab chewable tablet; Chew 1 Tablet every day.    Non-seasonal allergic rhinitis due to pollen    Orders:    azelastine (ASTELIN) 0.1 % nasal spray; Administer 1 Spray into affected nostril(S) 2 times a day.    fluticasone (FLONASE) 50 MCG/ACT nasal spray; Administer 1 Spray into affected nostril(S) 2 times a day.    Vasculogenic erectile dysfunction, unspecified vasculogenic erectile dysfunction type    Orders:    sildenafil citrate (VIAGRA) 100 MG tablet; Take 1 Tablet by mouth 2 times a day as needed for Erectile Dysfunction (sexual intercource and/or bph).    Vitamin D deficiency    Orders:    VITAMIN D,25 HYDROXY (DEFICIENCY); Future    IFG (impaired fasting glucose)    Orders:    HEMOGLOBIN A1C; Future    BPH without urinary obstruction    Orders:    PROSTATE SPECIFIC AG DIAGNOSTIC; Future                      [1]   Outpatient Medications Prior to Visit   Medication Sig Dispense Refill    cetirizine (ZYRTEC) 10 MG Tab Take 1 Tablet by mouth every day. 30 Tablet 3    Plant Sterols and Stanols (CHOLESTOFF PLUS PO) Take  by mouth 2 times a day.      Flaxseed, Linseed, (FLAX SEED OIL PO) Take 1,400 mg by mouth every day.      Omega-3 Fatty Acids (FISH OIL) 1200 MG Cap Take  by  mouth 2 times a day.      Ascorbic Acid (SUPER C COMPLEX PO) Take  by mouth every day.      Zinc 50 MG Tab Take 50 mg by mouth every day.      Omeprazole Magnesium (PRILOSEC PO) Take  by mouth every day.      Turmeric (QC TUMERIC COMPLEX PO) Take  by mouth 2 times a day.      levothyroxine (SYNTHROID) 137 MCG Tab Take 1 Tablet by mouth every morning on an empty stomach. 100 Tablet 2    atorvastatin (LIPITOR) 40 MG Tab Take 1 Tablet by mouth every evening. 90 Tablet 3    aspirin (ASA) 81 MG Chew Tab chewable tablet Chew 1 Tablet every day. 100 Tablet 3    sildenafil citrate (VIAGRA) 100 MG tablet Take one tablet by mouth as needed for erectile dysfunction 180 Tablet 1    azelastine (ASTELIN) 137 MCG/SPRAY nasal spray Administer 1 Spray into affected nostril(S) 2 times a day. 30 mL 2    fluticasone (FLONASE) 50 MCG/ACT nasal spray Administer 1 Spray into affected nostril(S) 2 times a day. 16 g 3     No facility-administered medications prior to visit.

## 2025-08-18 ENCOUNTER — OFFICE VISIT (OUTPATIENT)
Dept: MEDICAL GROUP | Age: 80
End: 2025-08-18
Payer: MEDICARE

## 2025-08-18 VITALS
BODY MASS INDEX: 22.73 KG/M2 | OXYGEN SATURATION: 97 % | DIASTOLIC BLOOD PRESSURE: 60 MMHG | SYSTOLIC BLOOD PRESSURE: 104 MMHG | HEART RATE: 58 BPM | TEMPERATURE: 98.8 F | HEIGHT: 68 IN | WEIGHT: 150 LBS

## 2025-08-18 DIAGNOSIS — E55.9 VITAMIN D DEFICIENCY: ICD-10-CM

## 2025-08-18 DIAGNOSIS — R73.01 IFG (IMPAIRED FASTING GLUCOSE): ICD-10-CM

## 2025-08-18 DIAGNOSIS — N40.0 BPH WITHOUT URINARY OBSTRUCTION: ICD-10-CM

## 2025-08-18 DIAGNOSIS — C44.90 SKIN CANCER: ICD-10-CM

## 2025-08-18 DIAGNOSIS — D64.9 ANEMIA, NORMOCYTIC NORMOCHROMIC: ICD-10-CM

## 2025-08-18 DIAGNOSIS — E03.4 HYPOTHYROIDISM DUE TO ACQUIRED ATROPHY OF THYROID: ICD-10-CM

## 2025-08-18 DIAGNOSIS — R41.3 MEMORY LOSS OF UNKNOWN CAUSE: Primary | ICD-10-CM

## 2025-08-18 DIAGNOSIS — E78.5 DYSLIPIDEMIA: ICD-10-CM

## 2025-08-18 PROCEDURE — 99214 OFFICE O/P EST MOD 30 MIN: CPT | Performed by: INTERNAL MEDICINE

## 2025-08-18 PROCEDURE — 3074F SYST BP LT 130 MM HG: CPT | Performed by: INTERNAL MEDICINE

## 2025-08-18 PROCEDURE — 3078F DIAST BP <80 MM HG: CPT | Performed by: INTERNAL MEDICINE

## 2025-08-18 ASSESSMENT — ENCOUNTER SYMPTOMS
EYES NEGATIVE: 1
RESPIRATORY NEGATIVE: 1
PSYCHIATRIC NEGATIVE: 1
MUSCULOSKELETAL NEGATIVE: 1
CARDIOVASCULAR NEGATIVE: 1
GASTROINTESTINAL NEGATIVE: 1
CONSTITUTIONAL NEGATIVE: 1
NEUROLOGICAL NEGATIVE: 1

## 2025-08-18 ASSESSMENT — FIBROSIS 4 INDEX: FIB4 SCORE: 1.73

## (undated) DEVICE — MASK ANESTHESIA ADULT  - (100/CA)

## (undated) DEVICE — CATHETER IV 20 GA X 1-1/4 ---SURG.& SDS ONLY--- (50EA/BX)

## (undated) DEVICE — HEAD HOLDER JUNIOR/ADULT

## (undated) DEVICE — ELECTRODE 850 FOAM ADHESIVE - HYDROGEL RADIOTRNSPRNT (50/PK)

## (undated) DEVICE — LACTATED RINGERS INJ 1000 ML - (14EA/CA 60CA/PF)

## (undated) DEVICE — GLOVE BIOGEL SZ 7.5 SURGICAL PF LTX - (50PR/BX 4BX/CA)

## (undated) DEVICE — SUTURE 3-0 VICRYL PLUS SH - 8X 18 INCH (12/BX)

## (undated) DEVICE — TRAY SKIN SCRUB PVP WET (20EA/CA) PART #DYND70356 DISCONTINUED

## (undated) DEVICE — SLEEVE, VASO, THIGH, MED

## (undated) DEVICE — SET LEADWIRE 5 LEAD BEDSIDE DISPOSABLE ECG (1SET OF 5/EA)

## (undated) DEVICE — CANISTER SUCTION RIGID RED 1500CC (40EA/CA)

## (undated) DEVICE — GLOVE BIOGEL INDICATOR SZ 8 SURGICAL PF LTX - (50/BX 4BX/CA)

## (undated) DEVICE — DRESSING TRANSPARENT FILM TEGADERM 4 X 4.75" (50EA/BX)"

## (undated) DEVICE — SUTURE 2-0 VICRYL PLUS CT-2 - 27 INCH (36/BX)

## (undated) DEVICE — ELECTRODE DUAL RETURN W/ CORD - (50/PK)

## (undated) DEVICE — PROTECTOR ULNA NERVE - (36PR/CA)

## (undated) DEVICE — SODIUM CHL IRRIGATION 0.9% 1000ML (12EA/CA)

## (undated) DEVICE — GLOVE BIOGEL PI INDICATOR SZ 8.0 SURGICAL PF LF -(50/BX 4BX/CA)

## (undated) DEVICE — TUBE CONNECTING SUCTION - CLEAR PLASTIC STERILE 72 IN (50EA/CA)

## (undated) DEVICE — SUTURE 4-0 VICRYL PLUS FS-2 - 27 INCH (36/BX)

## (undated) DEVICE — TUBE E-T HI-LO CUFF 8.0MM (10EA/PK)

## (undated) DEVICE — KIT  I.V. START (100EA/CA)

## (undated) DEVICE — MANIFOLD NEPTUNE 1 PORT (20/PK)

## (undated) DEVICE — TUBING CLEARLINK DUO-VENT - C-FLO (48EA/CA)

## (undated) DEVICE — SUCTION INSTRUMENT YANKAUER BULBOUS TIP W/O VENT (50EA/CA)

## (undated) DEVICE — KIT ANESTHESIA W/CIRCUIT & 3/LT BAG W/FILTER (20EA/CA)

## (undated) DEVICE — SENSOR SPO2 NEO LNCS ADHESIVE (20/BX) SEE USER NOTES

## (undated) DEVICE — PACK MINOR BASIN - (2EA/CA)

## (undated) DEVICE — BLADE SURGICAL #10 - (50/BX)

## (undated) DEVICE — SUTURE GENERAL

## (undated) DEVICE — WATER IRRIGATION STERILE 1000ML (12EA/CA)

## (undated) DEVICE — CANISTER SUCTION 3000ML MECHANICAL FILTER AUTO SHUTOFF MEDI-VAC NONSTERILE LF DISP  (40EA/CA)

## (undated) DEVICE — GOWN WARMING STANDARD FLEX - (30/CA)